# Patient Record
Sex: MALE | Race: BLACK OR AFRICAN AMERICAN | Employment: OTHER | URBAN - NONMETROPOLITAN AREA
[De-identification: names, ages, dates, MRNs, and addresses within clinical notes are randomized per-mention and may not be internally consistent; named-entity substitution may affect disease eponyms.]

---

## 2018-05-22 RX ORDER — BUPIVACAINE HYDROCHLORIDE 2.5 MG/ML
5 INJECTION, SOLUTION EPIDURAL; INFILTRATION; INTRACAUDAL ONCE
Status: CANCELLED | OUTPATIENT
Start: 2018-05-22 | End: 2018-05-22

## 2018-05-22 RX ORDER — METHYLPREDNISOLONE ACETATE 80 MG/ML
80 INJECTION, SUSPENSION INTRA-ARTICULAR; INTRALESIONAL; INTRAMUSCULAR; SOFT TISSUE ONCE
Status: CANCELLED | OUTPATIENT
Start: 2018-05-22 | End: 2018-05-22

## 2018-05-23 ENCOUNTER — HOSPITAL ENCOUNTER (OUTPATIENT)
Dept: INTERVENTIONAL RADIOLOGY/VASCULAR | Age: 68
Discharge: HOME | End: 2018-05-23
Payer: OTHER GOVERNMENT

## 2018-05-23 DIAGNOSIS — M25.552 LEFT HIP PAIN: ICD-10-CM

## 2018-05-23 PROCEDURE — 77002 NEEDLE LOCALIZATION BY XRAY: CPT | Performed by: RADIOLOGY

## 2018-05-23 PROCEDURE — 6360000002 HC RX W HCPCS

## 2018-05-23 PROCEDURE — 20610 DRAIN/INJ JOINT/BURSA W/O US: CPT | Performed by: RADIOLOGY

## 2018-05-23 PROCEDURE — 6360000004 HC RX CONTRAST MEDICATION: Performed by: RADIOLOGY

## 2018-05-23 PROCEDURE — 2500000003 HC RX 250 WO HCPCS

## 2018-05-23 RX ORDER — METHYLPREDNISOLONE ACETATE 80 MG/ML
80 INJECTION, SUSPENSION INTRA-ARTICULAR; INTRALESIONAL; INTRAMUSCULAR; SOFT TISSUE ONCE
Status: COMPLETED | OUTPATIENT
Start: 2018-05-23 | End: 2018-05-23

## 2018-05-23 RX ORDER — BUPIVACAINE HYDROCHLORIDE 2.5 MG/ML
5 INJECTION, SOLUTION EPIDURAL; INFILTRATION; INTRACAUDAL ONCE
Status: COMPLETED | OUTPATIENT
Start: 2018-05-23 | End: 2018-05-23

## 2018-05-23 RX ADMIN — IOTHALAMATE MEGLUMINE 1 ML: 600 INJECTION INTRAVASCULAR at 14:40

## 2018-05-23 RX ADMIN — BUPIVACAINE HYDROCHLORIDE 10 MG: 2.5 INJECTION, SOLUTION EPIDURAL; INFILTRATION; INTRACAUDAL at 14:43

## 2018-05-23 RX ADMIN — METHYLPREDNISOLONE ACETATE 80 MG: 80 INJECTION, SUSPENSION INTRA-ARTICULAR; INTRALESIONAL; INTRAMUSCULAR; SOFT TISSUE at 14:42

## 2018-05-23 ASSESSMENT — PAIN SCALES - GENERAL: PAINLEVEL_OUTOF10: 10

## 2018-05-31 ENCOUNTER — HOSPITAL ENCOUNTER (OUTPATIENT)
Dept: INTERVENTIONAL RADIOLOGY/VASCULAR | Age: 68
Discharge: HOME | End: 2018-05-31
Payer: OTHER GOVERNMENT

## 2018-05-31 VITALS
HEART RATE: 62 BPM | OXYGEN SATURATION: 99 % | RESPIRATION RATE: 18 BRPM | DIASTOLIC BLOOD PRESSURE: 75 MMHG | BODY MASS INDEX: 31.14 KG/M2 | SYSTOLIC BLOOD PRESSURE: 153 MMHG | WEIGHT: 217 LBS

## 2018-05-31 PROCEDURE — 2500000003 HC RX 250 WO HCPCS

## 2018-05-31 PROCEDURE — 6360000002 HC RX W HCPCS

## 2018-05-31 PROCEDURE — 62323 NJX INTERLAMINAR LMBR/SAC: CPT | Performed by: RADIOLOGY

## 2018-05-31 PROCEDURE — 6360000004 HC RX CONTRAST MEDICATION: Performed by: RADIOLOGY

## 2018-05-31 RX ORDER — GLIMEPIRIDE 1 MG/1
1 TABLET ORAL
Status: ON HOLD | COMMUNITY
End: 2018-11-10 | Stop reason: HOSPADM

## 2018-05-31 RX ORDER — BUPIVACAINE HYDROCHLORIDE 2.5 MG/ML
2 INJECTION, SOLUTION EPIDURAL; INFILTRATION; INTRACAUDAL ONCE
Status: COMPLETED | OUTPATIENT
Start: 2018-05-31 | End: 2018-05-31

## 2018-05-31 RX ORDER — CILOSTAZOL 100 MG/1
100 TABLET ORAL 2 TIMES DAILY
Status: ON HOLD | COMMUNITY
End: 2019-02-04 | Stop reason: HOSPADM

## 2018-05-31 RX ORDER — HYDROCHLOROTHIAZIDE 25 MG/1
25 TABLET ORAL DAILY
COMMUNITY

## 2018-05-31 RX ORDER — METHYLPREDNISOLONE ACETATE 80 MG/ML
80 INJECTION, SUSPENSION INTRA-ARTICULAR; INTRALESIONAL; INTRAMUSCULAR; SOFT TISSUE ONCE
Status: COMPLETED | OUTPATIENT
Start: 2018-05-31 | End: 2018-05-31

## 2018-05-31 RX ADMIN — METHYLPREDNISOLONE ACETATE 80 MG: 80 INJECTION, SUSPENSION INTRA-ARTICULAR; INTRALESIONAL; INTRAMUSCULAR; SOFT TISSUE at 14:05

## 2018-05-31 RX ADMIN — IOHEXOL 1 ML: 180 INJECTION INTRAVENOUS at 14:05

## 2018-05-31 RX ADMIN — BUPIVACAINE HYDROCHLORIDE 2 ML: 2.5 INJECTION, SOLUTION EPIDURAL; INFILTRATION; INTRACAUDAL at 14:05

## 2018-05-31 ASSESSMENT — PAIN DESCRIPTION - DIRECTION: RADIATING_TOWARDS: LEFT LEG TO KNEE

## 2018-05-31 ASSESSMENT — PAIN SCALES - GENERAL
PAINLEVEL_OUTOF10: 0
PAINLEVEL_OUTOF10: 4
PAINLEVEL_OUTOF10: 0
PAINLEVEL_OUTOF10: 4

## 2018-05-31 ASSESSMENT — PAIN DESCRIPTION - DESCRIPTORS: DESCRIPTORS: SPASM

## 2018-05-31 ASSESSMENT — PAIN DESCRIPTION - ORIENTATION: ORIENTATION: LEFT;LOWER

## 2018-05-31 ASSESSMENT — PAIN DESCRIPTION - LOCATION: LOCATION: BACK

## 2018-05-31 ASSESSMENT — PAIN DESCRIPTION - PAIN TYPE: TYPE: CHRONIC PAIN

## 2018-05-31 ASSESSMENT — PAIN - FUNCTIONAL ASSESSMENT: PAIN_FUNCTIONAL_ASSESSMENT: 0-10

## 2018-11-05 ENCOUNTER — HOSPITAL ENCOUNTER (INPATIENT)
Age: 68
LOS: 3 days | Discharge: HOME | DRG: 287 | End: 2018-11-10
Attending: INTERNAL MEDICINE | Admitting: INTERNAL MEDICINE
Payer: MEDICARE

## 2018-11-05 DIAGNOSIS — R13.19 OTHER DYSPHAGIA: Primary | ICD-10-CM

## 2018-11-05 PROBLEM — R07.9 CHEST PAIN: Status: ACTIVE | Noted: 2018-11-05

## 2018-11-05 LAB
AVERAGE GLUCOSE: 168 MG/DL (ref 70–126)
D-DIMER QUANTITATIVE: 1492 NG/ML FEU (ref 0–500)
EKG ATRIAL RATE: 67 BPM
EKG P AXIS: 44 DEGREES
EKG P-R INTERVAL: 170 MS
EKG Q-T INTERVAL: 406 MS
EKG QRS DURATION: 102 MS
EKG QTC CALCULATION (BAZETT): 429 MS
EKG R AXIS: -32 DEGREES
EKG T AXIS: -158 DEGREES
EKG VENTRICULAR RATE: 67 BPM
GLUCOSE BLD-MCNC: 138 MG/DL (ref 70–108)
HBA1C MFR BLD: 7.6 % (ref 4.4–6.4)
INR BLD: 0.95 (ref 0.85–1.13)
LIPASE: 21.2 U/L (ref 5.6–51.3)
TROPONIN T: < 0.01 NG/ML

## 2018-11-05 PROCEDURE — 82948 REAGENT STRIP/BLOOD GLUCOSE: CPT

## 2018-11-05 PROCEDURE — 6370000000 HC RX 637 (ALT 250 FOR IP): Performed by: INTERNAL MEDICINE

## 2018-11-05 PROCEDURE — 84484 ASSAY OF TROPONIN QUANT: CPT

## 2018-11-05 PROCEDURE — 99220 PR INITIAL OBSERVATION CARE/DAY 70 MINUTES: CPT | Performed by: INTERNAL MEDICINE

## 2018-11-05 PROCEDURE — 85379 FIBRIN DEGRADATION QUANT: CPT

## 2018-11-05 PROCEDURE — 93005 ELECTROCARDIOGRAM TRACING: CPT | Performed by: INTERNAL MEDICINE

## 2018-11-05 PROCEDURE — 2580000003 HC RX 258: Performed by: INTERNAL MEDICINE

## 2018-11-05 PROCEDURE — 36415 COLL VENOUS BLD VENIPUNCTURE: CPT

## 2018-11-05 PROCEDURE — 85610 PROTHROMBIN TIME: CPT

## 2018-11-05 PROCEDURE — 93010 ELECTROCARDIOGRAM REPORT: CPT | Performed by: NUCLEAR MEDICINE

## 2018-11-05 PROCEDURE — G0378 HOSPITAL OBSERVATION PER HR: HCPCS

## 2018-11-05 PROCEDURE — 83690 ASSAY OF LIPASE: CPT

## 2018-11-05 PROCEDURE — G0379 DIRECT REFER HOSPITAL OBSERV: HCPCS

## 2018-11-05 PROCEDURE — 6360000002 HC RX W HCPCS: Performed by: INTERNAL MEDICINE

## 2018-11-05 PROCEDURE — 2500000003 HC RX 250 WO HCPCS: Performed by: INTERNAL MEDICINE

## 2018-11-05 PROCEDURE — 83036 HEMOGLOBIN GLYCOSYLATED A1C: CPT

## 2018-11-05 RX ORDER — POTASSIUM CHLORIDE 20MEQ/15ML
40 LIQUID (ML) ORAL PRN
Status: DISCONTINUED | OUTPATIENT
Start: 2018-11-05 | End: 2018-11-10 | Stop reason: HOSPADM

## 2018-11-05 RX ORDER — SODIUM CHLORIDE 9 MG/ML
INJECTION, SOLUTION INTRAVENOUS CONTINUOUS
Status: DISCONTINUED | OUTPATIENT
Start: 2018-11-06 | End: 2018-11-10 | Stop reason: HOSPADM

## 2018-11-05 RX ORDER — NICOTINE POLACRILEX 4 MG
15 LOZENGE BUCCAL PRN
Status: DISCONTINUED | OUTPATIENT
Start: 2018-11-05 | End: 2018-11-10 | Stop reason: HOSPADM

## 2018-11-05 RX ORDER — POTASSIUM CHLORIDE 7.45 MG/ML
10 INJECTION INTRAVENOUS PRN
Status: DISCONTINUED | OUTPATIENT
Start: 2018-11-05 | End: 2018-11-10 | Stop reason: HOSPADM

## 2018-11-05 RX ORDER — NITROGLYCERIN 80 MG/1
1 PATCH TRANSDERMAL DAILY
Status: DISCONTINUED | OUTPATIENT
Start: 2018-11-05 | End: 2018-11-05

## 2018-11-05 RX ORDER — IPRATROPIUM BROMIDE AND ALBUTEROL SULFATE 2.5; .5 MG/3ML; MG/3ML
1 SOLUTION RESPIRATORY (INHALATION) EVERY 4 HOURS PRN
Status: DISCONTINUED | OUTPATIENT
Start: 2018-11-05 | End: 2018-11-10 | Stop reason: HOSPADM

## 2018-11-05 RX ORDER — HYDROCHLOROTHIAZIDE 25 MG/1
25 TABLET ORAL DAILY
Status: DISCONTINUED | OUTPATIENT
Start: 2018-11-05 | End: 2018-11-10 | Stop reason: HOSPADM

## 2018-11-05 RX ORDER — DEXTROSE MONOHYDRATE 50 MG/ML
100 INJECTION, SOLUTION INTRAVENOUS PRN
Status: DISCONTINUED | OUTPATIENT
Start: 2018-11-05 | End: 2018-11-10 | Stop reason: HOSPADM

## 2018-11-05 RX ORDER — ASPIRIN 81 MG/1
81 TABLET ORAL DAILY
Status: DISCONTINUED | OUTPATIENT
Start: 2018-11-05 | End: 2018-11-05

## 2018-11-05 RX ORDER — CILOSTAZOL 100 MG/1
100 TABLET ORAL 2 TIMES DAILY
Status: DISCONTINUED | OUTPATIENT
Start: 2018-11-05 | End: 2018-11-10 | Stop reason: HOSPADM

## 2018-11-05 RX ORDER — GLIMEPIRIDE 1 MG/1
1 TABLET ORAL
Status: DISCONTINUED | OUTPATIENT
Start: 2018-11-06 | End: 2018-11-05

## 2018-11-05 RX ORDER — DEXTROSE MONOHYDRATE 25 G/50ML
12.5 INJECTION, SOLUTION INTRAVENOUS PRN
Status: DISCONTINUED | OUTPATIENT
Start: 2018-11-05 | End: 2018-11-10 | Stop reason: HOSPADM

## 2018-11-05 RX ORDER — SPIRONOLACTONE 25 MG/1
25 TABLET ORAL DAILY
Status: DISCONTINUED | OUTPATIENT
Start: 2018-11-05 | End: 2018-11-10 | Stop reason: HOSPADM

## 2018-11-05 RX ORDER — NITROGLYCERIN 0.4 MG/1
0.4 TABLET SUBLINGUAL EVERY 5 MIN PRN
Status: DISCONTINUED | OUTPATIENT
Start: 2018-11-05 | End: 2018-11-10 | Stop reason: HOSPADM

## 2018-11-05 RX ORDER — AMLODIPINE BESYLATE 10 MG/1
10 TABLET ORAL DAILY
Status: DISCONTINUED | OUTPATIENT
Start: 2018-11-05 | End: 2018-11-10 | Stop reason: HOSPADM

## 2018-11-05 RX ORDER — ISOSORBIDE MONONITRATE 30 MG/1
30 TABLET, EXTENDED RELEASE ORAL DAILY
Status: DISCONTINUED | OUTPATIENT
Start: 2018-11-06 | End: 2018-11-09

## 2018-11-05 RX ORDER — SODIUM CHLORIDE 0.9 % (FLUSH) 0.9 %
10 SYRINGE (ML) INJECTION PRN
Status: DISCONTINUED | OUTPATIENT
Start: 2018-11-05 | End: 2018-11-10 | Stop reason: HOSPADM

## 2018-11-05 RX ORDER — ATORVASTATIN CALCIUM 40 MG/1
40 TABLET, FILM COATED ORAL DAILY
Status: DISCONTINUED | OUTPATIENT
Start: 2018-11-05 | End: 2018-11-10 | Stop reason: HOSPADM

## 2018-11-05 RX ORDER — SODIUM CHLORIDE 0.9 % (FLUSH) 0.9 %
10 SYRINGE (ML) INJECTION EVERY 12 HOURS SCHEDULED
Status: DISCONTINUED | OUTPATIENT
Start: 2018-11-05 | End: 2018-11-10 | Stop reason: HOSPADM

## 2018-11-05 RX ORDER — POTASSIUM CHLORIDE 20 MEQ/1
40 TABLET, EXTENDED RELEASE ORAL PRN
Status: DISCONTINUED | OUTPATIENT
Start: 2018-11-05 | End: 2018-11-10 | Stop reason: HOSPADM

## 2018-11-05 RX ORDER — TRAMADOL HYDROCHLORIDE 50 MG/1
50 TABLET ORAL EVERY 6 HOURS PRN
Status: DISCONTINUED | OUTPATIENT
Start: 2018-11-05 | End: 2018-11-10 | Stop reason: HOSPADM

## 2018-11-05 RX ORDER — NITROGLYCERIN 80 MG/1
1 PATCH TRANSDERMAL DAILY
Status: COMPLETED | OUTPATIENT
Start: 2018-11-05 | End: 2018-11-06

## 2018-11-05 RX ORDER — LABETALOL HYDROCHLORIDE 5 MG/ML
10 INJECTION, SOLUTION INTRAVENOUS EVERY 6 HOURS
Status: DISCONTINUED | OUTPATIENT
Start: 2018-11-05 | End: 2018-11-10 | Stop reason: HOSPADM

## 2018-11-05 RX ORDER — ONDANSETRON 2 MG/ML
4 INJECTION INTRAMUSCULAR; INTRAVENOUS EVERY 4 HOURS PRN
Status: DISCONTINUED | OUTPATIENT
Start: 2018-11-05 | End: 2018-11-09 | Stop reason: SDUPTHER

## 2018-11-05 RX ADMIN — Medication 10 ML: at 21:34

## 2018-11-05 RX ADMIN — HYDROCHLOROTHIAZIDE 25 MG: 25 TABLET ORAL at 17:25

## 2018-11-05 RX ADMIN — AMLODIPINE BESYLATE 10 MG: 10 TABLET ORAL at 17:25

## 2018-11-05 RX ADMIN — LIDOCAINE HYDROCHLORIDE: 20 SOLUTION ORAL; TOPICAL at 17:25

## 2018-11-05 RX ADMIN — SODIUM CHLORIDE: 9 INJECTION, SOLUTION INTRAVENOUS at 23:53

## 2018-11-05 RX ADMIN — ATORVASTATIN CALCIUM 40 MG: 40 TABLET, FILM COATED ORAL at 21:30

## 2018-11-05 RX ADMIN — CILOSTAZOL 100 MG: 100 TABLET ORAL at 21:30

## 2018-11-05 RX ADMIN — SPIRONOLACTONE 25 MG: 25 TABLET ORAL at 17:25

## 2018-11-05 RX ADMIN — ENOXAPARIN SODIUM 100 MG: 100 INJECTION SUBCUTANEOUS at 17:30

## 2018-11-05 ASSESSMENT — PAIN SCALES - GENERAL
PAINLEVEL_OUTOF10: 0
PAINLEVEL_OUTOF10: 2

## 2018-11-05 ASSESSMENT — PAIN DESCRIPTION - DESCRIPTORS: DESCRIPTORS: HEAVINESS

## 2018-11-05 ASSESSMENT — PAIN DESCRIPTION - PAIN TYPE: TYPE: ACUTE PAIN

## 2018-11-05 ASSESSMENT — PAIN DESCRIPTION - ORIENTATION: ORIENTATION: LEFT

## 2018-11-05 ASSESSMENT — PAIN DESCRIPTION - FREQUENCY: FREQUENCY: CONTINUOUS

## 2018-11-05 ASSESSMENT — PAIN DESCRIPTION - LOCATION: LOCATION: CHEST

## 2018-11-05 ASSESSMENT — PAIN DESCRIPTION - ONSET: ONSET: AWAKENED FROM SLEEP

## 2018-11-05 NOTE — H&P
Smoker     Years: 20.00    Smokeless tobacco: Not on file    Alcohol use No    Drug use: No    Sexual activity: Not on file     Other Topics Concern    Not on file     Social History Narrative    No narrative on file         TOBACCO:   reports that he has quit smoking. He quit after 20.00 years of use. He does not have any smokeless tobacco history on file. ETOH:   reports that he does not drink alcohol. Review of systems     Pertinent positives as noted in the HPI. All other systemsreviewed and negative. Review of Systems      Physical examination     There were no vitals taken for this visit. General appearance:  No apparent distress. Patient is very conversational  HEENT: Normocephalic. Pupils equal, round, and reactive to light. Extraocular motion intact. Conjunctivae clear. Nose symmetric without evidence of discharge. Oral mucosa moist without erythema or exudate. Neck: Supple. NoJVD. No carotid bruits. Trachea midline. No thyromegaly. Cardiovascular:  Regular rate and rhythm/ Mild 2/6 MARY. No rubs or gallops. Respiratory: Clear to auscultation, bilaterally without Rales/Wheezes/Rhonchi. Abdomen: Soft, non-tender, non-distended with normal bowel sounds. Musculoskeletal:  Full range of motion without deformity. Neurologic:  No focal sensory/motor deficits. Cranial nerves: II-XII intact. Lymphatic: No cervical lymphadenopathy. Psychiatric:  Alert and oriented. Vascular: Radial pulses palpable. .  No peripheral edema. Decreased dorsalis pedis at the left lower extremity  Genitourinary: Deferred. Skin: No visible rashes or lesions.       Labs and imaging     WBC 9.17 4.50 - 11.00 K/mcL [address] [place] [address] LAB   RBC 4.69 4.50 - 5.90 M/mcL [address] [place] [address] LAB   Hemoglobin 13.4 (L) 13.5 - 17.5 g/dL [address] [place] [address] LAB   Hematocrit 41.4 41.0 - 53.0 % [facility] LAB   MCV 88.3 80.0 - 100.0 fL [address] [place] [address] LAB   MCH 28.6 26.0 - 34.0 pg [address] [place] [address] LAB   MCHC 32.4 31.0 - 37.0 g/dL [facility] LAB   Platelets 161 239 - 344 K/mcL [address] [place] [address] LAB   RDW - CV 11.9 11.6 - anginal chest pain as aborted with the use of nitroglycerin with one dose of enoxaparin and nitroglycerin patch  4. Pharmacological stress test ordered  5. Screen with D-dimer for pulmonary embolism. Check lipase. 6. Hold Mobic for now and replace with tramadol pending stress test  7. If no findings on stress test consider further gastrointestinal workup such as EGD  8. Repeat EKG  9. Echocardiogram  10. Continue to monitor closely  11. Use ISS for now pending evaluation for acute coronary syndrome  12. Labetalol prn for uncontrolled blood pressure. Continue hydrochlorothiazide, amlodipine and isosorbide mononitirate     DVT prophylaxis: [x] Lovenox                                 [x] SCDs                                 [] SQ Heparin                                 [] Encourage ambulation           [] Already on Anticoagulation    Disposition:    [x] Home       [] TCU       [] Rehab       [] Psych       [] SNF       [] [place]       [] Other-      Code Status: Full Code    Placed in Observation. Thank you Kavitha Nance for theopportunity to be involved in this patient's care.     Electronically signed by Marko Gary MD on 11/5/2018 at 3:58 PM

## 2018-11-06 ENCOUNTER — APPOINTMENT (OUTPATIENT)
Dept: CT IMAGING | Age: 68
DRG: 287 | End: 2018-11-06
Attending: INTERNAL MEDICINE
Payer: MEDICARE

## 2018-11-06 ENCOUNTER — APPOINTMENT (OUTPATIENT)
Dept: NON INVASIVE DIAGNOSTICS | Age: 68
DRG: 287 | End: 2018-11-06
Attending: INTERNAL MEDICINE
Payer: MEDICARE

## 2018-11-06 PROBLEM — I71.23 DESCENDING THORACIC AORTIC ANEURYSM (HCC): Status: ACTIVE | Noted: 2018-11-06

## 2018-11-06 PROBLEM — E11.9 TYPE 2 DIABETES MELLITUS, WITHOUT LONG-TERM CURRENT USE OF INSULIN (HCC): Status: ACTIVE | Noted: 2018-11-06

## 2018-11-06 LAB
ANION GAP SERPL CALCULATED.3IONS-SCNC: 12 MEQ/L (ref 8–16)
BACTERIA: NORMAL
BILIRUBIN URINE: NEGATIVE
BLOOD, URINE: NEGATIVE
BUN BLDV-MCNC: 11 MG/DL (ref 7–22)
CALCIUM SERPL-MCNC: 8.9 MG/DL (ref 8.5–10.5)
CASTS: NORMAL /LPF
CASTS: NORMAL /LPF
CHARACTER, URINE: CLEAR
CHLORIDE BLD-SCNC: 103 MEQ/L (ref 98–111)
CHOLESTEROL, TOTAL: 169 MG/DL (ref 100–199)
CO2: 23 MEQ/L (ref 23–33)
COLOR: YELLOW
CREAT SERPL-MCNC: 0.8 MG/DL (ref 0.4–1.2)
CRYSTALS: NORMAL
EPITHELIAL CELLS, UA: NORMAL /HPF
ERYTHROCYTE [DISTWIDTH] IN BLOOD BY AUTOMATED COUNT: 12.1 % (ref 11.5–14.5)
ERYTHROCYTE [DISTWIDTH] IN BLOOD BY AUTOMATED COUNT: 38.5 FL (ref 35–45)
GFR SERPL CREATININE-BSD FRML MDRD: > 90 ML/MIN/1.73M2
GLUCOSE BLD-MCNC: 161 MG/DL (ref 70–108)
GLUCOSE BLD-MCNC: 173 MG/DL (ref 70–108)
GLUCOSE BLD-MCNC: 177 MG/DL (ref 70–108)
GLUCOSE BLD-MCNC: 178 MG/DL (ref 70–108)
GLUCOSE BLD-MCNC: 224 MG/DL (ref 70–108)
GLUCOSE, URINE: NEGATIVE MG/DL
HCT VFR BLD CALC: 38.3 % (ref 42–52)
HDLC SERPL-MCNC: 31 MG/DL
HEMOGLOBIN: 12.3 GM/DL (ref 14–18)
KETONES, URINE: NEGATIVE
LDL CHOLESTEROL CALCULATED: 74 MG/DL
LEUKOCYTE ESTERASE, URINE: NEGATIVE
LV EF: 58 %
LVEF MODALITY: NORMAL
MCH RBC QN AUTO: 28.6 PG (ref 26–33)
MCHC RBC AUTO-ENTMCNC: 32.1 GM/DL (ref 32.2–35.5)
MCV RBC AUTO: 89.1 FL (ref 80–94)
MISCELLANEOUS LAB TEST RESULT: NORMAL
NITRITE, URINE: NEGATIVE
PH UA: 5
PLATELET # BLD: 170 THOU/MM3 (ref 130–400)
PMV BLD AUTO: 13.3 FL (ref 9.4–12.4)
POTASSIUM REFLEX MAGNESIUM: 3.6 MEQ/L (ref 3.5–5.2)
PROTEIN UA: NEGATIVE MG/DL
RBC # BLD: 4.3 MILL/MM3 (ref 4.7–6.1)
RBC URINE: NORMAL /HPF
RENAL EPITHELIAL, UA: NORMAL
SODIUM BLD-SCNC: 138 MEQ/L (ref 135–145)
SPECIFIC GRAVITY UA: 1.01 (ref 1–1.03)
TRIGL SERPL-MCNC: 321 MG/DL (ref 0–199)
TROPONIN T: < 0.01 NG/ML
UROBILINOGEN, URINE: 0.2 EU/DL
WBC # BLD: 10.5 THOU/MM3 (ref 4.8–10.8)
WBC UA: NORMAL /HPF
YEAST: NORMAL

## 2018-11-06 PROCEDURE — 99225 PR SBSQ OBSERVATION CARE/DAY 25 MINUTES: CPT | Performed by: FAMILY MEDICINE

## 2018-11-06 PROCEDURE — 6360000002 HC RX W HCPCS: Performed by: INTERNAL MEDICINE

## 2018-11-06 PROCEDURE — 82948 REAGENT STRIP/BLOOD GLUCOSE: CPT

## 2018-11-06 PROCEDURE — 6370000000 HC RX 637 (ALT 250 FOR IP): Performed by: INTERNAL MEDICINE

## 2018-11-06 PROCEDURE — 81001 URINALYSIS AUTO W/SCOPE: CPT

## 2018-11-06 PROCEDURE — 96372 THER/PROPH/DIAG INJ SC/IM: CPT

## 2018-11-06 PROCEDURE — G0378 HOSPITAL OBSERVATION PER HR: HCPCS

## 2018-11-06 PROCEDURE — 36415 COLL VENOUS BLD VENIPUNCTURE: CPT

## 2018-11-06 PROCEDURE — A9500 TC99M SESTAMIBI: HCPCS | Performed by: FAMILY MEDICINE

## 2018-11-06 PROCEDURE — 93306 TTE W/DOPPLER COMPLETE: CPT

## 2018-11-06 PROCEDURE — 3430000000 HC RX DIAGNOSTIC RADIOPHARMACEUTICAL: Performed by: FAMILY MEDICINE

## 2018-11-06 PROCEDURE — 6360000002 HC RX W HCPCS

## 2018-11-06 PROCEDURE — 80048 BASIC METABOLIC PNL TOTAL CA: CPT

## 2018-11-06 PROCEDURE — 6360000004 HC RX CONTRAST MEDICATION: Performed by: FAMILY MEDICINE

## 2018-11-06 PROCEDURE — 2709999900 HC NON-CHARGEABLE SUPPLY

## 2018-11-06 PROCEDURE — 93017 CV STRESS TEST TRACING ONLY: CPT | Performed by: INTERNAL MEDICINE

## 2018-11-06 PROCEDURE — 71275 CT ANGIOGRAPHY CHEST: CPT

## 2018-11-06 PROCEDURE — 85027 COMPLETE CBC AUTOMATED: CPT

## 2018-11-06 PROCEDURE — 78452 HT MUSCLE IMAGE SPECT MULT: CPT

## 2018-11-06 PROCEDURE — 87086 URINE CULTURE/COLONY COUNT: CPT

## 2018-11-06 PROCEDURE — 80061 LIPID PANEL: CPT

## 2018-11-06 PROCEDURE — 2580000003 HC RX 258: Performed by: INTERNAL MEDICINE

## 2018-11-06 RX ORDER — ALBUTEROL SULFATE 90 UG/1
2 AEROSOL, METERED RESPIRATORY (INHALATION) EVERY 4 HOURS PRN
COMMUNITY

## 2018-11-06 RX ORDER — MELOXICAM 15 MG/1
15 TABLET ORAL DAILY
Status: ON HOLD | COMMUNITY
End: 2018-11-10 | Stop reason: HOSPADM

## 2018-11-06 RX ORDER — IBUPROFEN 600 MG/1
600 TABLET ORAL EVERY 4 HOURS PRN
Status: ON HOLD | COMMUNITY
End: 2018-11-10 | Stop reason: HOSPADM

## 2018-11-06 RX ADMIN — AMLODIPINE BESYLATE 10 MG: 10 TABLET ORAL at 11:33

## 2018-11-06 RX ADMIN — CILOSTAZOL 100 MG: 100 TABLET ORAL at 11:33

## 2018-11-06 RX ADMIN — HYDROCHLOROTHIAZIDE 25 MG: 25 TABLET ORAL at 11:33

## 2018-11-06 RX ADMIN — SPIRONOLACTONE 25 MG: 25 TABLET ORAL at 11:33

## 2018-11-06 RX ADMIN — CILOSTAZOL 100 MG: 100 TABLET ORAL at 20:07

## 2018-11-06 RX ADMIN — ATORVASTATIN CALCIUM 40 MG: 40 TABLET, FILM COATED ORAL at 20:07

## 2018-11-06 RX ADMIN — ASPIRIN 325 MG: 325 TABLET, DELAYED RELEASE ORAL at 11:33

## 2018-11-06 RX ADMIN — IOPAMIDOL 80 ML: 755 INJECTION, SOLUTION INTRAVENOUS at 11:02

## 2018-11-06 RX ADMIN — Medication 10 ML: at 20:07

## 2018-11-06 RX ADMIN — ENOXAPARIN SODIUM 100 MG: 100 INJECTION SUBCUTANEOUS at 03:24

## 2018-11-06 RX ADMIN — Medication 29.8 MILLICURIE: at 09:37

## 2018-11-06 RX ADMIN — ENOXAPARIN SODIUM 100 MG: 100 INJECTION SUBCUTANEOUS at 17:46

## 2018-11-06 RX ADMIN — ISOSORBIDE MONONITRATE 30 MG: 30 TABLET ORAL at 11:33

## 2018-11-06 RX ADMIN — Medication 4 UNITS: at 11:36

## 2018-11-06 RX ADMIN — Medication 9.6 MILLICURIE: at 08:00

## 2018-11-06 ASSESSMENT — PAIN SCALES - GENERAL: PAINLEVEL_OUTOF10: 0

## 2018-11-06 NOTE — PROGRESS NOTES
 insulin lispro  0-6 Units Subcutaneous Nightly    labetalol  10 mg Intravenous Q6H    aspirin  325 mg Oral Daily    enoxaparin  1 mg/kg Subcutaneous Q12H     PRN Meds: nitroGLYCERIN, sodium chloride flush, magnesium hydroxide, ondansetron, potassium chloride **OR** potassium chloride **OR** potassium chloride, potassium chloride, magnesium sulfate, glucose, dextrose, glucagon (rDNA), dextrose, glucose, dextrose, glucagon (rDNA), dextrose, ipratropium-albuterol, traMADol      Intake/Output Summary (Last 24 hours) at 11/06/18 0750  Last data filed at 11/06/18 0316   Gross per 24 hour   Intake              504 ml   Output              553 ml   Net              -49 ml       Diet:  Diet NPO, After Midnight    Exam:  /68   Pulse 78   Temp 98.7 °F (37.1 °C) (Oral)   Resp 16   Ht 5' 10\" (1.778 m)   Wt 218 lb 12.8 oz (99.2 kg)   SpO2 97%   BMI 31.39 kg/m²     General appearance: alert, no in acute distress   HEENT: clear oral mucosa, no oropharyngeal erythema   Neck: Supple, with full range of motion. No jugular venous distention. Trachea midline. Respiratory:  Normal respiratory effort. Clear to auscultation, bilaterally without Rales/Wheezes/Rhonchi. Cardiovascular: Regular rate and rhythm with normal S1/S2 without murmurs, rubs or gallops. Abdomen: Soft, non-tender, non-distended with normal bowel sounds. Musculoskeletal: passive and active ROM x 4 extremities. Skin: Skin color, texture, turgor normal.  No rashes or lesions. Psychiatric: Alert and oriented, thought content appropriate, normal insight  Exam of extremities: peripheral pulses normal, no pedal edema, no clubbing or cyanosis      Labs:   Recent Labs      11/06/18   0500   WBC  10.5   HGB  12.3*   HCT  38.3*   PLT  170     Recent Labs      11/06/18   0500   NA  138   K  3.6   CL  103   CO2  23   BUN  11   CREATININE  0.8   CALCIUM  8.9     No results for input(s): AST, ALT, BILIDIR, BILITOT, ALKPHOS in the last 72 hours.   Recent Labs

## 2018-11-07 LAB
GLUCOSE BLD-MCNC: 143 MG/DL (ref 70–108)
GLUCOSE BLD-MCNC: 153 MG/DL (ref 70–108)
GLUCOSE BLD-MCNC: 190 MG/DL (ref 70–108)
GLUCOSE BLD-MCNC: 220 MG/DL (ref 70–108)

## 2018-11-07 PROCEDURE — 2580000003 HC RX 258: Performed by: INTERNAL MEDICINE

## 2018-11-07 PROCEDURE — 87880 STREP A ASSAY W/OPTIC: CPT

## 2018-11-07 PROCEDURE — 1200000003 HC TELEMETRY R&B

## 2018-11-07 PROCEDURE — 6370000000 HC RX 637 (ALT 250 FOR IP): Performed by: INTERNAL MEDICINE

## 2018-11-07 PROCEDURE — 6360000002 HC RX W HCPCS: Performed by: INTERNAL MEDICINE

## 2018-11-07 PROCEDURE — 82948 REAGENT STRIP/BLOOD GLUCOSE: CPT

## 2018-11-07 PROCEDURE — 99232 SBSQ HOSP IP/OBS MODERATE 35: CPT | Performed by: FAMILY MEDICINE

## 2018-11-07 PROCEDURE — 6370000000 HC RX 637 (ALT 250 FOR IP): Performed by: FAMILY MEDICINE

## 2018-11-07 PROCEDURE — 96372 THER/PROPH/DIAG INJ SC/IM: CPT

## 2018-11-07 RX ORDER — GUAIFENESIN 600 MG/1
600 TABLET, EXTENDED RELEASE ORAL 2 TIMES DAILY
Status: DISCONTINUED | OUTPATIENT
Start: 2018-11-07 | End: 2018-11-10 | Stop reason: HOSPADM

## 2018-11-07 RX ORDER — PANTOPRAZOLE SODIUM 40 MG/1
40 TABLET, DELAYED RELEASE ORAL
Status: DISCONTINUED | OUTPATIENT
Start: 2018-11-08 | End: 2018-11-10 | Stop reason: HOSPADM

## 2018-11-07 RX ADMIN — GUAIFENESIN 600 MG: 600 TABLET, EXTENDED RELEASE ORAL at 22:22

## 2018-11-07 RX ADMIN — ISOSORBIDE MONONITRATE 30 MG: 30 TABLET ORAL at 08:39

## 2018-11-07 RX ADMIN — Medication 10 ML: at 08:40

## 2018-11-07 RX ADMIN — GUAIFENESIN 600 MG: 600 TABLET, EXTENDED RELEASE ORAL at 14:40

## 2018-11-07 RX ADMIN — HYDROCHLOROTHIAZIDE 25 MG: 25 TABLET ORAL at 08:39

## 2018-11-07 RX ADMIN — CILOSTAZOL 100 MG: 100 TABLET ORAL at 22:22

## 2018-11-07 RX ADMIN — Medication 10 ML: at 22:23

## 2018-11-07 RX ADMIN — ENOXAPARIN SODIUM 100 MG: 100 INJECTION SUBCUTANEOUS at 19:05

## 2018-11-07 RX ADMIN — AMLODIPINE BESYLATE 10 MG: 10 TABLET ORAL at 08:39

## 2018-11-07 RX ADMIN — ATORVASTATIN CALCIUM 40 MG: 40 TABLET, FILM COATED ORAL at 22:22

## 2018-11-07 RX ADMIN — CILOSTAZOL 100 MG: 100 TABLET ORAL at 08:39

## 2018-11-07 RX ADMIN — ASPIRIN 325 MG: 325 TABLET, DELAYED RELEASE ORAL at 08:39

## 2018-11-07 RX ADMIN — SPIRONOLACTONE 25 MG: 25 TABLET ORAL at 08:39

## 2018-11-07 ASSESSMENT — PAIN SCALES - GENERAL
PAINLEVEL_OUTOF10: 0

## 2018-11-07 NOTE — PROGRESS NOTES
0-18 Units Subcutaneous TID     insulin lispro  0-9 Units Subcutaneous Nightly    atorvastatin  40 mg Oral Daily    isosorbide mononitrate  30 mg Oral Daily    spironolactone  25 mg Oral Daily    amLODIPine  10 mg Oral Daily    hydrochlorothiazide  25 mg Oral Daily    cilostazol  100 mg Oral BID    sodium chloride flush  10 mL Intravenous 2 times per day    labetalol  10 mg Intravenous Q6H    aspirin  325 mg Oral Daily    enoxaparin  1 mg/kg Subcutaneous Q12H     PRN Meds: nitroGLYCERIN, sodium chloride flush, magnesium hydroxide, ondansetron, potassium chloride **OR** potassium chloride **OR** potassium chloride, potassium chloride, magnesium sulfate, glucose, dextrose, glucagon (rDNA), dextrose, glucose, dextrose, glucagon (rDNA), dextrose, ipratropium-albuterol, traMADol      Intake/Output Summary (Last 24 hours) at 11/07/18 1033  Last data filed at 11/07/18 0524   Gross per 24 hour   Intake              730 ml   Output              850 ml   Net             -120 ml       Diet:  DIET CARB CONTROL; No Added Salt (3-4 GM)    Exam:  /76   Pulse 73   Temp 98.2 °F (36.8 °C) (Oral)   Resp 16   Ht 5' 10\" (1.778 m)   Wt 214 lb 12.8 oz (97.4 kg)   SpO2 95%   BMI 30.82 kg/m²     General appearance: alert, not in acute distress   HEENT: clear oral mucosa, no oropharyngeal erythema, no tonsillar swelling or erythema. Neck: Supple, with full range of motion. No jugular venous distention. Trachea midline. Respiratory:  Normal respiratory effort. Clear to auscultation, bilaterally without Rales/Wheezes/Rhonchi. Cardiovascular: Regular rate and rhythm with normal S1/S2 without murmurs, rubs or gallops. Abdomen: Soft, non-tender, non-distended with normal bowel sounds. Musculoskeletal: passive and active ROM x 4 extremities. Skin: Skin color, texture, turgor normal.  No rashes or lesions.   Psychiatric: Alert and oriented, thought content appropriate, normal insight  Exam of extremities: peripheral ordered    4. Dry cough    -chest CTA showed no lung consolidations  -start Mucinex     5. Aneurysm on descending thoracic aorta, w atherosclerotic calcifications and mural thrombus    -cardiothoracic consult  -pt already on lovenox SQ      6. HTN, controlled     -cont amlodipine, HCTZ, aldactone, labetalol  -VS per protocol  -low salt diet      7. DM type 2, fairly controlled     -A1C on 11/5/18: 7.6. accu-check BG not controlled. -increased SSI to high dose   -cont accu-check   -carb-control diet   -pt reports he was on metformin but was stopped 5 years ago by pcp due to improved blood sugar ( per pt)     8. H/o COPD     -not on exacerbation   -monitor. duoneb prn      9.  Former smoker          Electronically signed by Wes Champagne MD on 11/7/2018 at 10:33 AM

## 2018-11-07 NOTE — PLAN OF CARE
Problem: Pain:  Goal: Pain level will decrease  Pain level will decrease    Outcome: Ongoing  Pt has not complained of any pain this shift. Will continue to monitor. Problem: Pain:  Goal: Pain level will decrease  Pain level will decrease    Outcome: Ongoing  Pt has not complained of any pain this shift. Will continue to monitor. Problem: Infection:  Goal: Will remain free from infection  Will remain free from infection  Outcome: Ongoing  Pt has not shown any signs or symptoms of infection. Will continue to monitor. Problem: Safety:  Goal: Free from accidental physical injury  Free from accidental physical injury  Outcome: Ongoing  No falls noted this shift. Continue falling star program. Bed alarm on, bed in low position. Call light and personal belongings in reach. Patient uses call light appropriately. Problem: Skin Integrity:  Goal: Skin integrity will stabilize  Skin integrity will stabilize  Outcome: Ongoing  No new signs or symptoms of skin breakdown noted this shift, encouraging patient to turn and reposition self in bed q2h      Comments: Care plan reviewed with patient and wife. Patient and wife verbalize understanding of the plan of care and contribute to goal setting.

## 2018-11-08 ENCOUNTER — APPOINTMENT (OUTPATIENT)
Dept: GENERAL RADIOLOGY | Age: 68
DRG: 287 | End: 2018-11-08
Attending: INTERNAL MEDICINE
Payer: MEDICARE

## 2018-11-08 ENCOUNTER — APPOINTMENT (OUTPATIENT)
Dept: INTERVENTIONAL RADIOLOGY/VASCULAR | Age: 68
DRG: 287 | End: 2018-11-08
Attending: INTERNAL MEDICINE
Payer: MEDICARE

## 2018-11-08 LAB
GLUCOSE BLD-MCNC: 147 MG/DL (ref 70–108)
GLUCOSE BLD-MCNC: 157 MG/DL (ref 70–108)
GLUCOSE BLD-MCNC: 193 MG/DL (ref 70–108)
GLUCOSE BLD-MCNC: 227 MG/DL (ref 70–108)
GROUP A STREP CULTURE, REFLEX: NEGATIVE
REFLEX THROAT C + S: NORMAL
URINE CULTURE, ROUTINE: NORMAL

## 2018-11-08 PROCEDURE — 2580000003 HC RX 258: Performed by: INTERNAL MEDICINE

## 2018-11-08 PROCEDURE — 74220 X-RAY XM ESOPHAGUS 1CNTRST: CPT

## 2018-11-08 PROCEDURE — 6370000000 HC RX 637 (ALT 250 FOR IP): Performed by: FAMILY MEDICINE

## 2018-11-08 PROCEDURE — 6370000000 HC RX 637 (ALT 250 FOR IP): Performed by: NURSE PRACTITIONER

## 2018-11-08 PROCEDURE — 6370000000 HC RX 637 (ALT 250 FOR IP): Performed by: INTERNAL MEDICINE

## 2018-11-08 PROCEDURE — A4641 RADIOPHARM DX AGENT NOC: HCPCS | Performed by: NURSE PRACTITIONER

## 2018-11-08 PROCEDURE — 87070 CULTURE OTHR SPECIMN AEROBIC: CPT

## 2018-11-08 PROCEDURE — 93880 EXTRACRANIAL BILAT STUDY: CPT

## 2018-11-08 PROCEDURE — 6360000004 HC RX CONTRAST MEDICATION: Performed by: NURSE PRACTITIONER

## 2018-11-08 PROCEDURE — 99232 SBSQ HOSP IP/OBS MODERATE 35: CPT | Performed by: FAMILY MEDICINE

## 2018-11-08 PROCEDURE — 92610 EVALUATE SWALLOWING FUNCTION: CPT

## 2018-11-08 PROCEDURE — APPSS60 APP SPLIT SHARED TIME 46-60 MINUTES: Performed by: PHYSICIAN ASSISTANT

## 2018-11-08 PROCEDURE — 6360000002 HC RX W HCPCS: Performed by: INTERNAL MEDICINE

## 2018-11-08 PROCEDURE — 82948 REAGENT STRIP/BLOOD GLUCOSE: CPT

## 2018-11-08 PROCEDURE — 2500000003 HC RX 250 WO HCPCS: Performed by: NURSE PRACTITIONER

## 2018-11-08 PROCEDURE — 1200000003 HC TELEMETRY R&B

## 2018-11-08 RX ORDER — GLIPIZIDE 5 MG/1
2.5 TABLET ORAL
Status: DISCONTINUED | OUTPATIENT
Start: 2018-11-08 | End: 2018-11-10 | Stop reason: HOSPADM

## 2018-11-08 RX ORDER — POLYETHYLENE GLYCOL 3350 17 G/17G
17 POWDER, FOR SOLUTION ORAL DAILY PRN
Status: DISCONTINUED | OUTPATIENT
Start: 2018-11-08 | End: 2018-11-10 | Stop reason: HOSPADM

## 2018-11-08 RX ADMIN — GUAIFENESIN 600 MG: 600 TABLET, EXTENDED RELEASE ORAL at 10:00

## 2018-11-08 RX ADMIN — ENOXAPARIN SODIUM 100 MG: 100 INJECTION SUBCUTANEOUS at 16:40

## 2018-11-08 RX ADMIN — ANTACID/ANTIFLATULENT 1 EACH: 380; 550; 10; 10 GRANULE, EFFERVESCENT ORAL at 08:50

## 2018-11-08 RX ADMIN — SPIRONOLACTONE 25 MG: 25 TABLET ORAL at 09:00

## 2018-11-08 RX ADMIN — CILOSTAZOL 100 MG: 100 TABLET ORAL at 10:07

## 2018-11-08 RX ADMIN — AMLODIPINE BESYLATE 10 MG: 10 TABLET ORAL at 10:07

## 2018-11-08 RX ADMIN — GUAIFENESIN 600 MG: 600 TABLET, EXTENDED RELEASE ORAL at 21:00

## 2018-11-08 RX ADMIN — INSULIN LISPRO 6 UNITS: 100 INJECTION, SOLUTION INTRAVENOUS; SUBCUTANEOUS at 16:42

## 2018-11-08 RX ADMIN — PANTOPRAZOLE SODIUM 40 MG: 40 TABLET, DELAYED RELEASE ORAL at 05:59

## 2018-11-08 RX ADMIN — Medication 10 ML: at 22:56

## 2018-11-08 RX ADMIN — CILOSTAZOL 100 MG: 100 TABLET ORAL at 22:55

## 2018-11-08 RX ADMIN — HYDROCHLOROTHIAZIDE 25 MG: 25 TABLET ORAL at 10:07

## 2018-11-08 RX ADMIN — BARIUM SULFATE 90 ML: 0.6 SUSPENSION ORAL at 08:50

## 2018-11-08 RX ADMIN — GLIPIZIDE 2.5 MG: 5 TABLET ORAL at 16:41

## 2018-11-08 RX ADMIN — ISOSORBIDE MONONITRATE 30 MG: 30 TABLET ORAL at 10:07

## 2018-11-08 RX ADMIN — BARIUM SULFATE 140 ML: 980 POWDER, FOR SUSPENSION ORAL at 08:51

## 2018-11-08 RX ADMIN — ATORVASTATIN CALCIUM 40 MG: 40 TABLET, FILM COATED ORAL at 23:06

## 2018-11-08 ASSESSMENT — PAIN SCALES - GENERAL
PAINLEVEL_OUTOF10: 0

## 2018-11-08 NOTE — PROGRESS NOTES
[address] [address]  SPEECH THERAPY  [facility] RENAL TELEMETRY 6K  Bedside Swallowing Evaluation      SLP Individual Minutes  Time In: 9792  Time Out: 9353  Minutes: 25  Timed Code Treatment Minutes: 0 Minutes       Date: 2018  Patient Name: More Curran      CSN: [id number]   : 1950  (77 y.o.)  Gender: male    Referring Physician:  LAURA Knight CNP  Diagnosis: Chest Pain   Secondary Diagnosis:  Dysphagia  History of Present Illness/Injury: Patient admitted to [facility] with above dx. See physician H&P for details. Patient with c/o \"things feel like they get stuck and eventually pass through pointing to the upper esophagus. \"  LAURA Knight CNP present for beginning of session. ST consulted by GI to complete BSE and determine best recommendations.    Past Medical History:   Diagnosis Date    Arthritis     Asthma     Blood circulation, collateral     bilateral LE    COPD (chronic obstructive pulmonary disease) (HCC)     Diabetes mellitus ([address]     Hyperlipidemia     Hypertension        Pain:  0/10    Current Diet: General with thin     Respiratory Status: [x] Independent [] Nasal Cannula [] Oxygen Mask      [] Tracheostomy [] Other:     [] Ventilator/Settings:    Behavioral Observation: [x] Alert [x] Oriented [] Confused [] Lethargic      [] Dysarthric [] Limited Direction Following [] Agitated      [] Other:    ORAL MECHANISM EVALUATION:         Comments:  Facial / Labial [x]WFL [] Impaired []DNT    Lingual [x]WFL [] Impaired []DNT    Dentition [x]WFL [] Impaired []DNT    Velum [x]WFL [] Impaired []DNT    Vocal Quality [x]WFL [] Impaired []DNT    Sensation [x]WFL [] Impaired []DNT    Cough []WFL [] Impaired [x]DNT    Other: []WFL [] Impaired []DNT    Other: []WFL [] Impaired []DNT        PATIENT WAS EVALUATED USING:  Thin, puree, hard solids     ORAL PHASE: [x] WFL  [] Impaired   [] Loss of bolus from lips [] Impaired AP movement [] Pocketing Left   [] Pocketing Right  [] Swallow: [] Is indicated to further assess    [x] Is NOT indicated at this time; Will recommend as        appropriate. DIET RECOMMENDATIONS:  Regular with thin     STRATEGIES: [] Strategies pending MBS results. [x] Full upright position  [x] Small bite/sip [] No Straw [] Multiple Swallow  [] Chin tuck [] Head turn [x] Pulmonary monitoring [] Oral care after all meals  [] Supervision  [] Medication in applesauce []Direct 1:1 Supervision  [] Spoon all liquids [] Alternate solid / liquid [] Limit distractions [] Monitor for fatigue  [] PMV in place for all po [] OTHER:      EDUCATION:   Learner: [x]Patient [x] Significant other [] Son/Daughter [] Parent     [] Other:   Education: [x] Reviewed results and recommendations of this evaluation     [x] Reviewed diet and strategies     [x] Reviewed signs, symptoms and risk of aspiration     [] Demonstrated how to thick liquid appropriately. [x] Reviewed goals and Plan of Care     [] OTHER:   Method: [x] Discussion [x] Demonstration [] Hand-out     [] OTHER:   Evaluation of Education:     [x] Verbalizes understanding [x] Demonstrates with assistance     [] Demonstrates without assistance []Needs further instruction     [] No evidence of learning  [] Family not present    PATIENT GOALS: [] Pt did not state. Will further assess during treatment. [] Return to the least restricted diet possible     [x] Return to previous level of function     [] OTHER:    PLAN / TREATMENT RECOMMENDATIONS:  [x] No further speech therapy services indicated.          Nicole Voss M.S. Anup

## 2018-11-08 NOTE — PROGRESS NOTES
Hospitalist Progress Note    Patient:  Armand To      Unit/Bed:6K-06/006-A    YOB: 1950    MRN: [id number]       Acct: [de-identified]     PCP: Zen GREENBERG    Date of Admission: 11/5/2018    Chief Complaint: chest pain    Hospital Course:     Please see H/P for details. In summary, this is a 77 y.o. Male, w PMH DM type 2, diet controlled, HTN, former smoker, COPD, was admitted to [facility] on 11/5/18 for chest pain. Pt initially presented to [facility] on 11/5/18 and was transferred to [facility] for further evaluation of chest pain. Also, per H/P has intermittent SOBE x 5 years. Troponin on admission was normal, EKG on admission rate 67, normal sinus rhythm, left axis deviation, incomplete rt bundle branch block, LVH w repolarization, when compared w EKG 5/25/18, no significant change. Pt was started on asa 325 mg po daily, lovenox 100 mg SQ q 12 hours, NTG. Echo, chest CTA and lexiscan stress test ordered. Of note, per H/P pt also takes Mobic, GI pathology one of differentials on H/P as caused of chest pain. Pt is a fomer smoker, used to smoke 1 ppd x 20 years, quit in 2000 per pt.       lexiscan stress test came back nuclear images suggestive for mild myocardial ischemia in the inferior wall. Chest CTA showed no PE, (+) mild ( 3.3 cm ) descending thoracic aortic aneurysm. Cardiology and Cardiovascular surgery consult ordered. Echo done on 11/6/18 showed EF 55-60%, no regional left ventricular wall motion abnormalities and wall thickness was within normal range with no evidence of aortic stenosis, no evidence of aortic regurgitation. On 11/7/18, pt was c/o chronic dysphagia x 1 year and sore throat. Rapid strep negative. GI consulted. Started on pantoprazole 40 mg po daily.  Pt was seen by  LAURA Boogie CNP, who recommend continue pantoprazole, per GI, no plans for EGD until cardiac has been cleared, this may also be completed as an outpatient, barium esophagram ordered by GI which showed small sliding hiatal hernia but otherwise unremarkable esophagram.    Subjective:     Pt seen and examined. Pt reports still having sore throat, feels throat dry. Cough improving. Denies chest pain, sob, palpitations, fever, chills, drooling, choking, odynophagia. Medications:  Reviewed    Infusion Medications    dextrose      dextrose      sodium chloride 40 mL/hr at 11/05/18 2353     Scheduled Medications    insulin lispro  0-18 Units Subcutaneous TID WC    insulin lispro  0-9 Units Subcutaneous Nightly    pantoprazole  40 mg Oral QAM AC    guaiFENesin  600 mg Oral BID    atorvastatin  40 mg Oral Daily    isosorbide mononitrate  30 mg Oral Daily    spironolactone  25 mg Oral Daily    amLODIPine  10 mg Oral Daily    hydrochlorothiazide  25 mg Oral Daily    cilostazol  100 mg Oral BID    sodium chloride flush  10 mL Intravenous 2 times per day    labetalol  10 mg Intravenous Q6H    aspirin  325 mg Oral Daily    enoxaparin  1 mg/kg Subcutaneous Q12H     PRN Meds: nitroGLYCERIN, sodium chloride flush, magnesium hydroxide, ondansetron, potassium chloride **OR** potassium chloride **OR** potassium chloride, potassium chloride, magnesium sulfate, glucose, dextrose, glucagon (rDNA), dextrose, glucose, dextrose, glucagon (rDNA), dextrose, ipratropium-albuterol, traMADol      Intake/Output Summary (Last 24 hours) at 11/08/18 0720  Last data filed at 11/08/18 0556   Gross per 24 hour   Intake             1164 ml   Output                0 ml   Net             1164 ml       Diet:  DIET CARB CONTROL; No Added Salt (3-4 GM)    Exam:  /63   Pulse 77   Temp 97.8 °F (36.6 °C) (Oral)   Resp 18   Ht 5' 10\" (1.778 m)   Wt 214 lb (97.1 kg)   SpO2 97%   BMI 30.71 kg/m²     General appearance: alert, not in acute distress   HEENT: clear oral mucosa, no oropharyngeal erythema, no tonsillar swelling or erythema. Neck: Supple, with full range of motion.  No

## 2018-11-08 NOTE — PROGRESS NOTES
Hospital Follow Up Note  [address] 1950 11/8/2018 2:04 PM  S        SLP at bedside for evaluation. Patient states cardiology to see and may need cath    O.    Vitals:    11/08/18 1155   BP: (!) 156/81   Pulse: 76   Resp: 16   Temp: 97.6 °F (36.4 °C)   SpO2: 99%            A&O         Heart  RRR         Lungs CTAB          ABD S/NT/ND/+BS         Ext No LLE     A. 77year old male seen as a new consult for dysphagia. He was admitted 11-5-18 as a transfer from [facility] with chest pain. Stress test abnormal and cardiology consulted. Patient reported dysphagia/globus for 1 year. Started on Pantoprazole 40mg daily. GI consulted. Dysphagia/globus x 1 year    Esophagram 11-8-18 Small sliding hiatal hernia but otherwise unremarkable   SLP eval results pending     Chest pain, abnormal stress test, cardiology consult pending   CTA chest 11-6-18 No pulmonary embolism. No acute intrathoracic findings. Aneurysm of the descending thoracic aor    Hypertension   DM type II   COPD      Osteoarthritis    PTSD   PVD         P. Continue Pantoprazole 40mg daily   Elevate head of bed at night   Await SLP evaluation   No plans for EGD until cardiac has been cleared.  This may also be completed as an outpatient  Follow       Assessment and planning discussed with Dr Kasia Watt, APRN, CNP

## 2018-11-09 ENCOUNTER — APPOINTMENT (OUTPATIENT)
Dept: CARDIAC CATH/INVASIVE PROCEDURES | Age: 68
DRG: 287 | End: 2018-11-09
Attending: INTERNAL MEDICINE
Payer: MEDICARE

## 2018-11-09 PROBLEM — I65.22 INTERNAL CAROTID ARTERY STENOSIS, LEFT: Status: ACTIVE | Noted: 2018-11-09

## 2018-11-09 LAB
GLUCOSE BLD-MCNC: 112 MG/DL (ref 70–108)
GLUCOSE BLD-MCNC: 141 MG/DL (ref 70–108)
GLUCOSE BLD-MCNC: 177 MG/DL (ref 70–108)

## 2018-11-09 PROCEDURE — 6370000000 HC RX 637 (ALT 250 FOR IP): Performed by: FAMILY MEDICINE

## 2018-11-09 PROCEDURE — B41J1ZZ FLUOROSCOPY OF OTHER LOWER ARTERIES USING LOW OSMOLAR CONTRAST: ICD-10-PCS | Performed by: INTERNAL MEDICINE

## 2018-11-09 PROCEDURE — B2111ZZ FLUOROSCOPY OF MULTIPLE CORONARY ARTERIES USING LOW OSMOLAR CONTRAST: ICD-10-PCS | Performed by: INTERNAL MEDICINE

## 2018-11-09 PROCEDURE — 4A023N7 MEASUREMENT OF CARDIAC SAMPLING AND PRESSURE, LEFT HEART, PERCUTANEOUS APPROACH: ICD-10-PCS | Performed by: INTERNAL MEDICINE

## 2018-11-09 PROCEDURE — 6360000004 HC RX CONTRAST MEDICATION: Performed by: INTERNAL MEDICINE

## 2018-11-09 PROCEDURE — 6360000002 HC RX W HCPCS

## 2018-11-09 PROCEDURE — 99232 SBSQ HOSP IP/OBS MODERATE 35: CPT | Performed by: FAMILY MEDICINE

## 2018-11-09 PROCEDURE — 2500000003 HC RX 250 WO HCPCS

## 2018-11-09 PROCEDURE — C1753 CATH, INTRAVAS ULTRASOUND: HCPCS

## 2018-11-09 PROCEDURE — 6370000000 HC RX 637 (ALT 250 FOR IP): Performed by: INTERNAL MEDICINE

## 2018-11-09 PROCEDURE — C1769 GUIDE WIRE: HCPCS

## 2018-11-09 PROCEDURE — 1200000003 HC TELEMETRY R&B

## 2018-11-09 PROCEDURE — 2580000003 HC RX 258: Performed by: INTERNAL MEDICINE

## 2018-11-09 PROCEDURE — 2709999900 HC NON-CHARGEABLE SUPPLY

## 2018-11-09 PROCEDURE — 36252 INS CATH REN ART 1ST BILAT: CPT | Performed by: INTERNAL MEDICINE

## 2018-11-09 PROCEDURE — B4101ZZ FLUOROSCOPY OF ABDOMINAL AORTA USING LOW OSMOLAR CONTRAST: ICD-10-PCS | Performed by: INTERNAL MEDICINE

## 2018-11-09 PROCEDURE — C1894 INTRO/SHEATH, NON-LASER: HCPCS

## 2018-11-09 PROCEDURE — 93458 L HRT ARTERY/VENTRICLE ANGIO: CPT | Performed by: INTERNAL MEDICINE

## 2018-11-09 PROCEDURE — B4181ZZ FLUOROSCOPY OF BILATERAL RENAL ARTERIES USING LOW OSMOLAR CONTRAST: ICD-10-PCS | Performed by: INTERNAL MEDICINE

## 2018-11-09 PROCEDURE — C1887 CATHETER, GUIDING: HCPCS

## 2018-11-09 PROCEDURE — 82948 REAGENT STRIP/BLOOD GLUCOSE: CPT

## 2018-11-09 PROCEDURE — B2151ZZ FLUOROSCOPY OF LEFT HEART USING LOW OSMOLAR CONTRAST: ICD-10-PCS | Performed by: INTERNAL MEDICINE

## 2018-11-09 RX ORDER — ONDANSETRON 2 MG/ML
4 INJECTION INTRAMUSCULAR; INTRAVENOUS EVERY 6 HOURS PRN
Status: DISCONTINUED | OUTPATIENT
Start: 2018-11-09 | End: 2018-11-10 | Stop reason: HOSPADM

## 2018-11-09 RX ORDER — ISOSORBIDE MONONITRATE 60 MG/1
60 TABLET, EXTENDED RELEASE ORAL DAILY
Status: DISCONTINUED | OUTPATIENT
Start: 2018-11-09 | End: 2018-11-10 | Stop reason: HOSPADM

## 2018-11-09 RX ORDER — SODIUM CHLORIDE 0.9 % (FLUSH) 0.9 %
10 SYRINGE (ML) INJECTION PRN
Status: DISCONTINUED | OUTPATIENT
Start: 2018-11-09 | End: 2018-11-09 | Stop reason: SDUPTHER

## 2018-11-09 RX ORDER — ATROPINE SULFATE 0.4 MG/ML
0.5 AMPUL (ML) INJECTION
Status: ACTIVE | OUTPATIENT
Start: 2018-11-09 | End: 2018-11-09

## 2018-11-09 RX ORDER — ACETAMINOPHEN 325 MG/1
650 TABLET ORAL EVERY 4 HOURS PRN
Status: DISCONTINUED | OUTPATIENT
Start: 2018-11-09 | End: 2018-11-10 | Stop reason: HOSPADM

## 2018-11-09 RX ORDER — SODIUM CHLORIDE 9 MG/ML
100 INJECTION, SOLUTION INTRAVENOUS CONTINUOUS
Status: ACTIVE | OUTPATIENT
Start: 2018-11-09 | End: 2018-11-09

## 2018-11-09 RX ORDER — SODIUM CHLORIDE 0.9 % (FLUSH) 0.9 %
10 SYRINGE (ML) INJECTION EVERY 12 HOURS SCHEDULED
Status: DISCONTINUED | OUTPATIENT
Start: 2018-11-09 | End: 2018-11-09 | Stop reason: SDUPTHER

## 2018-11-09 RX ORDER — PANTOPRAZOLE SODIUM 40 MG/1
40 TABLET, DELAYED RELEASE ORAL
Qty: 30 TABLET | Refills: 11 | Status: SHIPPED | OUTPATIENT
Start: 2018-11-09

## 2018-11-09 RX ADMIN — METOPROLOL TARTRATE 25 MG: 25 TABLET ORAL at 21:52

## 2018-11-09 RX ADMIN — GUAIFENESIN 600 MG: 600 TABLET, EXTENDED RELEASE ORAL at 21:52

## 2018-11-09 RX ADMIN — CILOSTAZOL 100 MG: 100 TABLET ORAL at 21:52

## 2018-11-09 RX ADMIN — SODIUM CHLORIDE 100 ML/HR: 9 INJECTION, SOLUTION INTRAVENOUS at 10:23

## 2018-11-09 RX ADMIN — GUAIFENESIN 600 MG: 600 TABLET, EXTENDED RELEASE ORAL at 11:17

## 2018-11-09 RX ADMIN — SPIRONOLACTONE 25 MG: 25 TABLET ORAL at 11:16

## 2018-11-09 RX ADMIN — SODIUM CHLORIDE: 9 INJECTION, SOLUTION INTRAVENOUS at 03:57

## 2018-11-09 RX ADMIN — AMLODIPINE BESYLATE 10 MG: 10 TABLET ORAL at 11:20

## 2018-11-09 RX ADMIN — CILOSTAZOL 100 MG: 100 TABLET ORAL at 11:17

## 2018-11-09 RX ADMIN — HYDROCHLOROTHIAZIDE 25 MG: 25 TABLET ORAL at 11:16

## 2018-11-09 RX ADMIN — PANTOPRAZOLE SODIUM 40 MG: 40 TABLET, DELAYED RELEASE ORAL at 11:19

## 2018-11-09 RX ADMIN — ISOSORBIDE MONONITRATE 60 MG: 60 TABLET ORAL at 11:16

## 2018-11-09 RX ADMIN — LIDOCAINE HYDROCHLORIDE 15 ML: 20 SOLUTION ORAL; TOPICAL at 18:28

## 2018-11-09 RX ADMIN — GLIPIZIDE 2.5 MG: 5 TABLET ORAL at 16:55

## 2018-11-09 RX ADMIN — IOPAMIDOL 240 ML: 755 INJECTION, SOLUTION INTRAVENOUS at 07:53

## 2018-11-09 RX ADMIN — GLIPIZIDE 2.5 MG: 5 TABLET ORAL at 11:17

## 2018-11-09 RX ADMIN — ATORVASTATIN CALCIUM 40 MG: 40 TABLET, FILM COATED ORAL at 21:52

## 2018-11-09 RX ADMIN — METOPROLOL TARTRATE 25 MG: 25 TABLET ORAL at 11:19

## 2018-11-09 RX ADMIN — ASPIRIN 325 MG: 325 TABLET, DELAYED RELEASE ORAL at 08:00

## 2018-11-09 ASSESSMENT — PAIN SCALES - GENERAL
PAINLEVEL_OUTOF10: 0
PAINLEVEL_OUTOF10: 0

## 2018-11-09 NOTE — PROGRESS NOTES
Carotid duplex abnormal suggestive of LICA stenosis. Has bilateral calf intermittent claudication. Had cardiac cath by Dr Darius Leigh today.    After Dr. Darius Leigh discharges patient, can have carotid and PVD workup as outpatient    Plan: Sign off for now, can follow up in my office for carotid and PVD work up in 1-2 week if OK Dr. Darius Leigh

## 2018-11-09 NOTE — PROGRESS NOTES
cilostazol  100 mg Oral BID    sodium chloride flush  10 mL Intravenous 2 times per day    labetalol  10 mg Intravenous Q6H    aspirin  325 mg Oral Daily    enoxaparin  1 mg/kg Subcutaneous Q12H     PRN Meds: polyethylene glycol, lidocaine viscous, nitroGLYCERIN, sodium chloride flush, magnesium hydroxide, ondansetron, potassium chloride **OR** potassium chloride **OR** potassium chloride, potassium chloride, magnesium sulfate, glucose, dextrose, glucagon (rDNA), dextrose, glucose, dextrose, glucagon (rDNA), dextrose, ipratropium-albuterol, traMADol      Intake/Output Summary (Last 24 hours) at 11/09/18 0826  Last data filed at 11/08/18 2020   Gross per 24 hour   Intake              716 ml   Output                0 ml   Net              716 ml       Diet:  DIET CARB CONTROL; No Added Salt (3-4 GM)    Exam:  BP (P) 110/63   Pulse (P) 72   Temp (P) 97.8 °F (36.6 °C) (Oral)   Resp (P) 16   Ht 5' 10\" (1.778 m)   Wt 212 lb 11.2 oz (96.5 kg)   SpO2 95%   BMI 30.52 kg/m²     General appearance: alert, not in acute distress   HEENT: clear oral mucosa, no oropharyngeal erythema, no tonsillar swelling or erythema.    Neck: Supple, with full range of motion. No jugular venous distention. Trachea midline. Respiratory:  Normal respiratory effort. Clear to auscultation, bilaterally without Rales/Wheezes/Rhonchi. Cardiovascular: Regular rate and rhythm with normal S1/S2 without murmurs, rubs or gallops. Abdomen: Soft, non-tender, non-distended with normal bowel sounds. Musculoskeletal: passive and active ROM x 4 extremities. Skin: Skin color, texture, turgor normal.  No rashes or lesions. Psychiatric: Alert and oriented, thought content appropriate, normal insight  Exam of extremities: peripheral pulses normal, no pedal edema, no clubbing or cyanosis     Labs:   No results for input(s): WBC, HGB, HCT, PLT in the last 72 hours.   No results for input(s): NA, K, CL, CO2, BUN, CREATININE, CALCIUM, PHOS in the last 72 hours.    Invalid input(s): MAGNES  No results for input(s): AST, ALT, BILIDIR, BILITOT, ALKPHOS in the last 72 hours. No results for input(s): INR in the last 72 hours. No results for input(s): Carolin Stack in the last 72 hours. Urinalysis:    Lab Results   Component Value Date    NITRU NEGATIVE 11/06/2018    WBCUA 0-2 11/06/2018    BACTERIA FEW 11/06/2018    RBCUA NONE SEEN 11/06/2018    BLOODU NEGATIVE 11/06/2018    SPECGRAV 1.015 11/06/2018       Radiology:  FL ESOPHAGRAM   Final Result      Small sliding hiatal hernia but otherwise unremarkable esophagram.      Final report electronically signed by Dr. Tomasa Lewis on 11/8/2018 9:13 AM      CTA CHEST W WO CONTRAST   Final Result   1. No pulmonary embolism. 2. No acute intrathoracic findings. 3. Aneurysm of the descending thoracic aorta. Final report electronically signed by Dr. Tomasa Lewis on 11/6/2018 11:11 AM      VL DUP CAROTID BILATERAL    (Results Pending)           Assessment/Plan: This is a 77 y.o. Male admitted for chest pain     1. CAD s/p [facility] 11/9/2018        -pt presented w chest pain on 11/5/18, lexiscan stress test on 11/6/18 came back abn.   -cardio on-board. Appreciate Dr. Ayana Frederick input. S/p [facility] 11/9/2018. -on metoprolol, imdur, statin, asa 325 mg po daily  -chest CTA negative for PE  -Echo 11/6/18 showed EF 55-60%, no regional left ventricular wall motion abnormalities and wall thickness was within normal range with no evidence of aortic stenosis, no evidence of aortic regurgitation.           2. Left ICA stenosis    -carotid US 11/8/18: stenosis of at least 70% demonstrated within the proximal left internal carotid artery, no sonographic evidence of significant flow-limiting stenosis within the proximal right internal carotid artery.   -Dr. Sylvia Hung ( Cardiothoracic surgery) saw the pt on 11/9/2018 who recommend f/u in 1-2 weeks w Dr. Sylvia Hung office in 1-2 weeks for carotid and PVD workup as outpatient. Appreciate Dr. Amisha Oneill input. 3. Dysphagia, chronic     -w globus sensation  -GI on-board. Appreciate LAURA Whitney CNP input  -cont protonix 40 mg po daily  -barium swallow 11/8/18: small hiatal hernia otherwise U/R. -speech eval done, recommendation as above  -GI scheduled outpatient EGD on 11/15/18 w Dr Neal Austin at [facility]      4. Sore throat, likely viral vs ?GERD-associated, improving      -throat looks normal  -rapid strep test negative   -cont lidocaine viscous      5. Dry cough, improving      -chest CTA showed no lung consolidations  -cont Mucinex      6. Aneurysm on descending thoracic aorta, w atherosclerotic calcifications and mural thrombus     -cardiothoracic sx on-board. Appreciate CV surgery input.   -seen by Natalie Schulz PA-C ( Cardiothoracic surgery) on 11/8/18, who recommend hold off on any surgical intervention for mildly dilated thoracic aneurysm, F/U with PCP for consideration of repeat CT in one year. 7. HTN, controlled     -cont amlodipine, HCTZ, aldactone. labetalol prn  -metoprolol added by cardio after [facility]  -VS per protocol  -low salt diet      8. DM type 2, fairly controlled     -A1C on 11/5/18: 7.6. accu-check BG not controlled. -increased SSI to high dose on 11/7/18, accu-check BG still above the goal after SSI dose increased, started glipizide 2.5 mg po bid. accu-check improving, will continue to monitor for now, will adjust glipizide dose prn.   -cont accu-check   -carb-control diet      9. H/o COPD     -not on exacerbation   -monitor. duoneb prn      10. Former smoker     6.  H/o intermittent claudication    -on cilostazol        Diet: DIET CARB CONTROL; No Added Salt (3-4 GM)    DVT prophylaxis: [x] Lovenox                                 [] SCDs                                 [] SQ Heparin                                 [] Encourage ambulation           [] Already on Anticoagulation     Disposition:    [] Home       [] TCU       [] Rehab       []

## 2018-11-09 NOTE — FLOWSHEET NOTE
11/09/18 1324   Encounter Summary   Services provided to: Patient and family together   Referral/Consult From: Patient; Family; Other    Support System Spouse; Family members; Spiritism/estela community   [facility]   Contact Spiritism Completed   Continue Visiting Yes  (11/9/18)   Complexity of Encounter Moderate   Length of Encounter 30 minutes   Spiritual/Yazidi   Type Spiritual support   Assessment Calm; Approachable   Intervention Active listening;Nurtured hope   Outcome Comfort;Expressed gratitude   S- During my contact with the patient and the family, I wanted to assess what their       spiritual and emotional needs were. O- The pt was in the bed and his wife was supportively in the chair by his side. The pt was receptive to my presence. 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 The pt shared some of his struggles and how he knows he has to take care of his body. We had a very good conversation. A- The pt knows that he has to get rest from his duties as a . I offered encouraging words and a prayer of comfort and peace. P-  Continued support would be helpful in order to meet the future Spiritual needs of the         patient.

## 2018-11-10 ENCOUNTER — TELEPHONE (OUTPATIENT)
Dept: CARDIOTHORACIC SURGERY | Age: 68
End: 2018-11-10

## 2018-11-10 VITALS
HEIGHT: 70 IN | WEIGHT: 212.7 LBS | OXYGEN SATURATION: 98 % | DIASTOLIC BLOOD PRESSURE: 70 MMHG | BODY MASS INDEX: 30.45 KG/M2 | RESPIRATION RATE: 18 BRPM | TEMPERATURE: 97.6 F | HEART RATE: 78 BPM | SYSTOLIC BLOOD PRESSURE: 146 MMHG

## 2018-11-10 LAB
GLUCOSE BLD-MCNC: 169 MG/DL (ref 70–108)
GLUCOSE BLD-MCNC: 171 MG/DL (ref 70–108)

## 2018-11-10 PROCEDURE — 6360000002 HC RX W HCPCS: Performed by: INTERNAL MEDICINE

## 2018-11-10 PROCEDURE — 6370000000 HC RX 637 (ALT 250 FOR IP): Performed by: INTERNAL MEDICINE

## 2018-11-10 PROCEDURE — 6370000000 HC RX 637 (ALT 250 FOR IP): Performed by: FAMILY MEDICINE

## 2018-11-10 PROCEDURE — 82948 REAGENT STRIP/BLOOD GLUCOSE: CPT

## 2018-11-10 PROCEDURE — 99239 HOSP IP/OBS DSCHRG MGMT >30: CPT | Performed by: FAMILY MEDICINE

## 2018-11-10 PROCEDURE — 99232 SBSQ HOSP IP/OBS MODERATE 35: CPT | Performed by: FAMILY MEDICINE

## 2018-11-10 RX ORDER — METFORMIN HYDROCHLORIDE 500 MG/1
500 TABLET, EXTENDED RELEASE ORAL
Qty: 30 TABLET | Refills: 0 | Status: ON HOLD | OUTPATIENT
Start: 2018-11-10 | End: 2022-04-06 | Stop reason: ALTCHOICE

## 2018-11-10 RX ORDER — ISOSORBIDE MONONITRATE 60 MG/1
60 TABLET, EXTENDED RELEASE ORAL DAILY
Qty: 30 TABLET | Refills: 0 | Status: ON HOLD | OUTPATIENT
Start: 2018-11-11 | End: 2018-12-20 | Stop reason: HOSPADM

## 2018-11-10 RX ORDER — ATORVASTATIN CALCIUM 80 MG/1
80 TABLET, FILM COATED ORAL DAILY
Qty: 30 TABLET | Refills: 0 | Status: SHIPPED | OUTPATIENT
Start: 2018-11-10

## 2018-11-10 RX ADMIN — ISOSORBIDE MONONITRATE 60 MG: 60 TABLET ORAL at 08:38

## 2018-11-10 RX ADMIN — SPIRONOLACTONE 25 MG: 25 TABLET ORAL at 08:39

## 2018-11-10 RX ADMIN — METOPROLOL TARTRATE 25 MG: 25 TABLET ORAL at 08:38

## 2018-11-10 RX ADMIN — CILOSTAZOL 100 MG: 100 TABLET ORAL at 08:38

## 2018-11-10 RX ADMIN — PANTOPRAZOLE SODIUM 40 MG: 40 TABLET, DELAYED RELEASE ORAL at 05:28

## 2018-11-10 RX ADMIN — ASPIRIN 325 MG: 325 TABLET, DELAYED RELEASE ORAL at 08:38

## 2018-11-10 RX ADMIN — GUAIFENESIN 600 MG: 600 TABLET, EXTENDED RELEASE ORAL at 08:38

## 2018-11-10 RX ADMIN — HYDROCHLOROTHIAZIDE 25 MG: 25 TABLET ORAL at 08:39

## 2018-11-10 RX ADMIN — POLYETHYLENE GLYCOL 3350 17 G: 17 POWDER, FOR SOLUTION ORAL at 08:38

## 2018-11-10 RX ADMIN — GLIPIZIDE 2.5 MG: 5 TABLET ORAL at 08:39

## 2018-11-10 RX ADMIN — AMLODIPINE BESYLATE 10 MG: 10 TABLET ORAL at 08:39

## 2018-11-10 RX ADMIN — ENOXAPARIN SODIUM 100 MG: 100 INJECTION SUBCUTANEOUS at 05:28

## 2018-11-10 ASSESSMENT — PAIN DESCRIPTION - LOCATION: LOCATION: CHEST

## 2018-11-10 ASSESSMENT — PAIN DESCRIPTION - PAIN TYPE: TYPE: ACUTE PAIN

## 2018-11-10 ASSESSMENT — PAIN SCALES - GENERAL: PAINLEVEL_OUTOF10: 3

## 2018-11-10 ASSESSMENT — PAIN DESCRIPTION - ORIENTATION: ORIENTATION: LEFT

## 2018-11-10 ASSESSMENT — PAIN DESCRIPTION - FREQUENCY: FREQUENCY: CONTINUOUS

## 2018-11-10 ASSESSMENT — PAIN DESCRIPTION - DESCRIPTORS: DESCRIPTORS: DULL

## 2018-11-10 NOTE — PROGRESS NOTES
32.1 11/06/2018    RDW 12.6 05/25/2016     11/06/2018    MPV 13.3 11/06/2018     BMP  Lab Results   Component Value Date     11/06/2018    K 3.6 11/06/2018     11/06/2018    CO2 23 11/06/2018    BUN 11 11/06/2018    CREATININE 0.8 11/06/2018    CALCIUM 8.9 11/06/2018      COAG PROFILE:  Lab Results   Component Value Date    INR 0.95 11/05/2018       Assessment:     Active Problems:    Chest pain    Chronic obstructive pulmonary disease (HCC)    PTSD (post-traumatic stress disorder)    Osteoarthritis of multiple joints    Peripheral vascular disease (HCC)    Type 2 diabetes mellitus, without long-term current use of insulin (HCC)    Descending thoracic aortic aneurysm (HCC)    Internal carotid artery stenosis, left  Resolved Problems:    * No resolved hospital problems.  *      Plan:   Patient feeling well no chest pain    cath had lad and diagonal disease    may need ivus of lad    severe PVD    may need intervention    severe renal artery stenosis may intervention    ok for discharge will be seen at  Office

## 2018-11-10 NOTE — PROGRESS NOTES
barium esophagram ordered by GI which showed small sliding hiatal hernia but otherwise unremarkable esophagram. Pt had speech eval on 11/8/18, who recommend regular diet with thin liquids, upright position, extra sauces/gravies and GERD regimen to target possible reflux, patient would benefit from EGD to follow to further target GI needs.     Pt was seen by Dr. Isha Kang (Cardiology) on 11/8/18, pt underwent Left Heart Cathbilateral selective renal angiogram abd aortogram on 11/9/2018.     Pt was seen by Toby Greenberg PA-C ( Cardiothoracic surgery) on 11/8/18, who recommend hold off on any surgical intervention for mildly dilated thoracic aneurysm, F/U with PCP for consideration of repeat CT in one year, Carotid duplex for bilat carotid bruits.     Carotid US done 11/8/18, showed stenosis of at least 70% demonstrated within the proximal left internal carotid artery, no sonographic evidence of significant flow-limiting stenosis within the proximal right internal carotid artery. Dr. Conrado Rodgers ( Cardiothoracic surgery) saw the pt on 11/9/2018 who recommend f/u in 1-2 weeks w Dr. Conrado Rodgers office in 1-2 weeks for carotid and PVD workup as outpatient.          Subjective:       Pt seen and examined. Pt denies chest pin, sob, palpitations, N/V, abd pain. Sore throat improving. Dry cough improving per pt.        Medications:  Reviewed    Infusion Medications    dextrose      dextrose      sodium chloride 40 mL/hr at 11/09/18 0357     Scheduled Medications    metoprolol tartrate  25 mg Oral BID    isosorbide mononitrate  60 mg Oral Daily    glipiZIDE  2.5 mg Oral BID AC    insulin lispro  0-18 Units Subcutaneous TID WC    insulin lispro  0-9 Units Subcutaneous Nightly    pantoprazole  40 mg Oral QAM AC    guaiFENesin  600 mg Oral BID    atorvastatin  40 mg Oral Daily    spironolactone  25 mg Oral Daily    amLODIPine  10 mg Oral Daily    hydrochlorothiazide  25 mg Oral Daily    cilostazol  100 mg Oral BID   

## 2018-11-10 NOTE — DISCHARGE SUMMARY
came back nuclear images suggestive for mild myocardial ischemia in the inferior wall. Chest CTA showed no PE, (+) mild ( 3.3 cm ) descending thoracic aortic aneurysm. Cardiology and Cardiovascular surgery consult ordered. Echo done on 11/6/18 showed EF 55-60%, no regional left ventricular wall motion abnormalities and wall thickness was within normal range with no evidence of aortic stenosis, no evidence of aortic regurgitation.      On 11/7/18, patient was c/o chronic dysphagia x 1 year and sore throat, accompanied by dry cough. Rapid strep negative. GI consulted. Started on pantoprazole 40 mg po daily. Patient was seen by LAURA Moreira CNP, who recommend continue pantoprazole, per GI, no plans for EGD until cardiac has been cleared, this may also be completed as an outpatient, barium esophagram ordered by GI which showed small sliding hiatal hernia but otherwise unremarkable esophagram. Patient had speech evaluation on 11/8/18, who recommend regular diet with thin liquids, upright position, extra sauces/gravies and GERD regimen to target possible reflux, patient would benefit from EGD to follow to further target GI needs. Sore throat and dry cough improving with pantoprazole.      Pt was seen by Dr. Arjun Coker (Cardiology) on 11/8/18, pt underwent Left Heart Cathbilateral selective renal angiogram abdominal aortogram on 11/9/2018.  Per Dr. Arjun Coker, patient has CAD ( LAD and diagonal disease) and severe right renal artery stenosis.      Pt was seen by Governor Mac PA-C ( Cardiothoracic surgery) on 11/8/18, who recommend hold off on any surgical intervention for mildly dilated thoracic aneurysm, F/U with PCP for consideration of repeat CT in one year, Carotid duplex for bilateral carotid bruits.     Carotid US done 11/8/18, showed stenosis of at least 70% demonstrated within the proximal left internal carotid artery, no sonographic evidence of significant flow-limiting stenosis within the proximal right S1/S2 without murmurs, rubs or gallops. Abdomen: Soft, non-tender, non-distended with normal bowel sounds. Musculoskeletal: passive and active ROM x 4 extremities. Skin: Skin color, texture, turgor normal.  No rashes or lesions. Psychiatric: Alert and oriented, thought content appropriate, normal insight  Exam of extremities: peripheral pulses normal, no pedal edema, no clubbing or cyanosis       Labs: For convenience and continuity at follow-up the following most recent labs are provided:      CBC:    Lab Results   Component Value Date    WBC 10.5 11/06/2018    HGB 12.3 11/06/2018    HCT 38.3 11/06/2018     11/06/2018       Renal:    Lab Results   Component Value Date     11/06/2018    K 3.6 11/06/2018     11/06/2018    CO2 23 11/06/2018    BUN 11 11/06/2018    CREATININE 0.8 11/06/2018    CALCIUM 8.9 11/06/2018         Significant Diagnostic Studies    Radiology:   VL DUP CAROTID BILATERAL   Final Result   1. No sonographic evidence of significant flow-limiting stenosis within the proximal right internal carotid artery. 2. There is stenosis of at least 70% demonstrated within the proximal left internal carotid artery. Recommend further characterization with CTA of the neck. 3. Antegrade flow within the vertebral arteries bilaterally. **This report has been created using voice recognition software. It may contain minor errors which are inherent in voice recognition technology. **      Final report electronically signed by Dr. Cory Harris on 11/9/2018 11:35 AM      FL ESOPHAGRAM   Final Result      Small sliding hiatal hernia but otherwise unremarkable esophagram.      Final report electronically signed by Dr. Rivka Seals on 11/8/2018 9:13 AM      CTA CHEST W WO CONTRAST   Final Result   1. No pulmonary embolism. 2. No acute intrathoracic findings. 3. Aneurysm of the descending thoracic aorta.       Final report electronically signed by Dr. Rivka Seals on 11/6/2018

## 2018-11-11 LAB — THROAT/NOSE CULTURE: NORMAL

## 2018-11-13 NOTE — CONSULTS
4 hours as needed for Wheezing or Shortness of Breath   Yes Historical Provider, MD   Tiotropium Bromide-Olodaterol (STIOLTO RESPIMAT) 2.5-2.5 MCG/ACT AERS Inhale 2 puffs into the lungs daily   Yes Historical Provider, MD   Omega 3-6-9 Fatty Acids (GNP TRIPLE OMEGA COMPLEX PO) Take 2 capsules by mouth 2 times daily   Yes Historical Provider, MD   meloxicam (MOBIC) 15 MG tablet Take 15 mg by mouth daily   Yes Historical Provider, MD   ibuprofen (ADVIL;MOTRIN) 600 MG tablet Take 600 mg by mouth every 4 hours as needed for Pain   Yes Historical Provider, MD   NONFORMULARY 1 capsule daily Blood Boost   Yes Historical Provider, MD   hydrochlorothiazide (HYDRODIURIL) 25 MG tablet Take 25 mg by mouth daily    Historical Provider, MD   cilostazol (PLETAL) 100 MG tablet Take 100 mg by mouth 2 times daily    Historical Provider, MD   glimepiride (AMARYL) 1 MG tablet Take 1 mg by mouth every morning (before breakfast)    Historical Provider, MD   isosorbide mononitrate (IMDUR) 30 MG CR tablet Take 1 tablet by mouth daily 5/25/16   Enmanuel Farfan MD   nitroGLYCERIN (NITROSTAT) 0.4 MG SL tablet Place 1 tablet under the tongue every 5 minutes as needed for Chest pain 5/25/16   Enmanuel Farfan MD   spironolactone (ALDACTONE) 25 MG tablet Take 1 tablet by mouth daily 5/25/16   Enmanuel Farfan MD   amLODIPine (NORVASC) 10 MG tablet Take 1 tablet by mouth daily 5/25/16   Enmanuel Farfan MD   atorvastatin (LIPITOR) 40 MG tablet Take 40 mg by mouth daily    Historical Provider, MD   aspirin 81 MG EC tablet Take 81 mg by mouth daily    Historical Provider, MD       Vital Signs  Vitals:    11/08/18 1548   BP: 118/62   Pulse: 84   Resp: 16   Temp: 98.1 °F (36.7 °C)   SpO2: 96%       Physical:  Heart:  regular rate and rhythm  Lungs:  Clear  Abdomen:  Soft  Mental Status:  Alert and Oriented    Planned Procedure:  Left Heart Cath and Possible Percutaneous Coronary Intervention    Sedation/ Anesthesia Plan: Midazolam and Sublimaze    ASA
the throat, the ear or the nose. CHEST:  No chest wall tenderness. A few basal crackles. He has an S4,  2/6 systolic murmur. ABDOMEN:  Soft. GENITAL/RECTAL:  Deferred. EXTREMITIES:  Lower limbs, there was no edema, weak peripheral  pulsations. LABORATORY DATA:  Lab work showed potassium 3.6 and his BUN 11,  creatinine 0.6. The hemoglobin was 12.3, hematocrit was 38, cholesterol  169. The EKG showed sinus rhythm, LVH, and a strain pattern _____ ischemia. IMPRESSION:  1. The patient with chest pain, shortness of breath, abnormal stress  test.  The patient on multiple antianginal medication. The patient will  be considered for heart cath. 2.  Hypertensive cardiovascular disease. Mild elevation in blood  pressure. The patient could be considered for renal angiogram at the  time of the heart cath. 3.  Diabetes mellitus. 4.  Hypercholesterolemia. 5.  Possible peripheral vascular disease. 6.  Possible esophageal stricture. PLAN:  The patient will be scheduled for heart cath. The patient  understands the procedure, the benefits, the risks. Agrees to have it  done. Continue aggressive medical treatment, risk factor modification.    Further recommendation pending the results of the above test.        Jossy Grijalva M.D.    D: 11/13/2018 6:12:30       T: 11/13/2018 10:10:35     [email or web address]  Job#: [id number]     Doc#: [id number]    CC:
Neurological: Negative for dizziness, tremors, speech difficulty, weakness, numbness and headaches. Hematological: Negative for adenopathy. Does not bruise/bleed easily. Psychiatric/Behavioral: Negative for agitation, confusion, dysphoric mood, self-injury, sleep disturbance and suicidal ideas. The patient is not hyperactive. Physical Exam:   General appearance:  No apparent distress, appears stated age and cooperative. HEENT:  Normal cephalic, atraumatic without obvious deformity. Pupils equal, round, and reactive to light. Extra ocular muscles intact. Conjunctivae/corneas clear. Neck: Supple, with full range of motion. No jugular venous distention. Trachea midline. Bilat carotid bruits. Respiratory:  Normal respiratory effort. Clear to auscultation, bilaterally without rales/wheezes/rhonchi  Cardiovascular:  Regular rate and rhythm with Grade II/VI MARY LSB radiating up to neck  Abdomen: Soft, non-tender, non-distended with normal bowel sounds. Musculoskeletal:  No clubbing, cyanosis or edema bilaterally. Full range of motion without deformity. Skin: Skin color, texture, turgor normal.  No rashes or lesions. Neurologic:  Neurovascularly intact without any focal sensory/motor deficits. Cranial nerves: II-XII intact, grossly non-focal.  Psychiatric:  Alert and oriented, thought content appropriate, normal insight  Capillary Refill: Brisk,< 3 seconds   Peripheral Pulses: +2 palpable, equal bilaterally      Assessment:   Patient Active Problem List   Diagnosis    Essential hypertension    DJD (degenerative joint disease) of thoracic spine    Chest pain    Chronic obstructive pulmonary disease (HCC)    PTSD (post-traumatic stress disorder)    Osteoarthritis of multiple joints    Peripheral vascular disease ([address]    Type 2 diabetes mellitus, without long-term current use of insulin ([address]    Descending thoracic aortic aneurysm ([address]       Plan: 11/8/18  1.  Hold off on any surgical intervention

## 2018-12-03 ENCOUNTER — OFFICE VISIT (OUTPATIENT)
Dept: CARDIOTHORACIC SURGERY | Age: 68
End: 2018-12-03
Payer: OTHER GOVERNMENT

## 2018-12-03 VITALS
BODY MASS INDEX: 30.72 KG/M2 | SYSTOLIC BLOOD PRESSURE: 167 MMHG | WEIGHT: 214.6 LBS | DIASTOLIC BLOOD PRESSURE: 82 MMHG | HEART RATE: 67 BPM | HEIGHT: 70 IN

## 2018-12-03 DIAGNOSIS — I65.22 CAROTID ARTERY STENOSIS WITHOUT CEREBRAL INFARCTION, LEFT: ICD-10-CM

## 2018-12-03 DIAGNOSIS — I70.0: Primary | ICD-10-CM

## 2018-12-03 PROCEDURE — 99204 OFFICE O/P NEW MOD 45 MIN: CPT | Performed by: THORACIC SURGERY (CARDIOTHORACIC VASCULAR SURGERY)

## 2018-12-03 ASSESSMENT — ENCOUNTER SYMPTOMS
EYES NEGATIVE: 1
RESPIRATORY NEGATIVE: 1
ALLERGIC/IMMUNOLOGIC NEGATIVE: 1
GASTROINTESTINAL NEGATIVE: 1

## 2018-12-03 NOTE — PROGRESS NOTES
[address] [facility]  Dept: [phone]  Dept Fax: [phone]: [phone]    Visit Date: 12/3/2018    Mr. Dev Veliz is a 77 y.o.male  who presented for:  Chief Complaint   Patient presents with    Follow-Up from Hospital     80% R renal artery stensosis, 70% L carotid stensosis    Other     severe R SFA stenosis, chest pressure    Consultation       HPI:   HPI : Mr Nasreen Avila is a very pleasant 78 y/o male that underwent a cardiac cath by Dr. Arjun Coker and was found with 70% Left common iliac takeoff stenosis, 70% right proximal CFA-SFA calcific stenosis. Duplex showed over 27% LICA stenosis without any focal lateralizing neurologic deficit. Also found with possible right renal artery takeoff stenosis. Will obtain CTA abdominal aorta with runoff to feet, CTA neck and see patient back. Patient refers right leg 2 block claudication     Cardiac cath. The coronary angiogram showed the left main was patent, bifurcates to  the LAD and circumflex. Diffuse moderate disease of the mid circumflex  was noticed with about 60%, ostium of the obtuse marginal about 60%  narrowing. PDA of the circumflex was patent. Circumflex is codominant  artery. The LAD was patent proximally. Diffuse nonobstructive disease  in its mid course with between 50% to 60%. Distally, the LAD was  patent. First high diagonal artery, small caliber artery. Subtotal  stenosis at the ostium. Second obtuse marginal, which is a longer  branch and does have 95% stenosis at the ostium.     The RCA was also a dominant artery. The RCA is tortuous proximally,  thus exhibit about 60% to 70% narrowing proximally, about 70% narrowing  of the mid posterolateral branch of the RCA.   About 50% to 60% narrowing  at the ostium and mid portion of the PDA of the RCA.     The left renal angiogram showed nonobstructive disease at the ostium of  the spironolactone (ALDACTONE) 25 MG tablet, Take 1 tablet by mouth daily, Disp: 30 tablet, Rfl: 3    amLODIPine (NORVASC) 10 MG tablet, Take 1 tablet by mouth daily, Disp: 30 tablet, Rfl: 3    aspirin 81 MG EC tablet, Take 81 mg by mouth daily, Disp: , Rfl:     No Known Allergies    Past Medical History  Lucas Posey  has a past medical history of Arthritis; Asthma; Blood circulation, collateral; COPD (chronic obstructive pulmonary disease) ([facility] [facility] Diabetes mellitus ([facility] [address] [facility] Hyperlipidemia; and Hypertension. Social History  Lucas Posey  reports that he has quit smoking. He quit after 20.00 years of use. He has never used smokeless tobacco. He reports that he does not drink alcohol or use drugs. Family History  Stephen family history includes Asthma in his father; Cancer in his mother; Diabetes in his mother and sister; Heart Disease in his brother and sister. There is no family history of bicuspid aortic valve, aneurysms, heart transplant, pacemakers, defibrillators, or sudden cardiac death. Past Surgical History   Past Surgical History:   Procedure Laterality Date    CARDIAC CATHETERIZATION      COLONOSCOPY      EYE SURGERY      right eye for detached retina    TONSILLECTOMY         Subjective:     Review of Systems   Constitutional: Negative. HENT: Negative. Eyes: Negative. Respiratory: Negative. Cardiovascular: Negative. Gastrointestinal: Negative. Endocrine: Negative. Genitourinary: Negative. Musculoskeletal: Negative. Skin: Negative. Allergic/Immunologic: Negative. Neurological: Negative. Hematological: Negative. Psychiatric/Behavioral: Negative.         Objective:     BP (!) 167/82 (Site: Right Upper Arm)   Pulse 67   Ht 5' 10\" (1.778 m)   Wt 214 lb 9.6 oz (97.3 kg)   BMI 30.79 kg/m²     Wt Readings from Last 3 Encounters:   12/03/18 214 lb 9.6 oz (97.3 kg)   11/09/18 212 lb 11.2 oz (96.5 kg)   05/31/18 217 lb (98.4 kg)     BP Readings from Last 3 Encounters:   12/03/18 MD Leah Ordoñez MD      Age             77 year(s)     Sonographer           Katheryn Ge MD                                  Physician     Procedure    Type of Study      TTE procedure:ECHOCARDIOGRAM COMPLETE 2D W DOPPLER W COLOR. Procedure Date  Date: 11/06/2018 Start: 10:08 AM    Study Location: Echo Lab  Technical Quality: Adequate visualization    Indications:Chest pain. Additional Medical History:Ex smoker, COPD, arthritis, PVD, hypertension,  diabetic, family history of premature CAD, hyperlipidemia    Patient Status: Routine    Height: 70.08 inches Weight: 218.26 pounds BSA: 2.17 m^2 BMI: 31.25 kg/m^2    BP: 133/68 mmHg     Conclusions      Summary   Ejection fraction was estimated at 55-60%. There were no regional left ventricular wall motion abnormalities and wall   thickness was within normal limits. E/A flow reversal noted. Suggestive of diastolic dysfunction. Signature      ----------------------------------------------------------------   Electronically signed by Lindsey Ibarra MD (Interpreting   physician) on 11/07/2018 at 04:18 PM   ----------------------------------------------------------------      Findings      Mitral Valve   The mitral valve structure was normal with normal leaflet separation. DOPPLER: The transmitral velocity was within the normal range with no   evidence for mitral stenosis. There was no evidence of mitral   regurgitation. Aortic Valve   The aortic valve was trileaflet with normal thickness and cuspal   separation. DOPPLER: Transaortic velocity was within the normal range with   no evidence of aortic stenosis. There was no evidence of aortic   regurgitation. Tricuspid Valve   The tricuspid valve structure was normal with normal leaflet separation.    DOPPLER: There was no evidence of tricuspid

## 2018-12-07 ENCOUNTER — HOSPITAL ENCOUNTER (OUTPATIENT)
Dept: INTERVENTIONAL RADIOLOGY/VASCULAR | Age: 68
Discharge: HOME | End: 2018-12-07
Payer: OTHER GOVERNMENT

## 2018-12-07 DIAGNOSIS — I70.0: ICD-10-CM

## 2018-12-07 PROCEDURE — 93922 UPR/L XTREMITY ART 2 LEVELS: CPT

## 2018-12-11 ENCOUNTER — HOSPITAL ENCOUNTER (OUTPATIENT)
Dept: CT IMAGING | Age: 68
Discharge: HOME | End: 2018-12-11
Payer: OTHER GOVERNMENT

## 2018-12-11 DIAGNOSIS — I70.0: ICD-10-CM

## 2018-12-11 LAB — POC CREATININE WHOLE BLOOD: 0.9 MG/DL (ref 0.5–1.2)

## 2018-12-11 PROCEDURE — 82565 ASSAY OF CREATININE: CPT

## 2018-12-11 PROCEDURE — 75635 CT ANGIO ABDOMINAL ARTERIES: CPT

## 2018-12-11 PROCEDURE — 6360000004 HC RX CONTRAST MEDICATION: Performed by: THORACIC SURGERY (CARDIOTHORACIC VASCULAR SURGERY)

## 2018-12-11 RX ADMIN — IOPAMIDOL 110 ML: 755 INJECTION, SOLUTION INTRAVENOUS at 10:09

## 2018-12-12 ENCOUNTER — HOSPITAL ENCOUNTER (OUTPATIENT)
Dept: CT IMAGING | Age: 68
Discharge: HOME | End: 2018-12-12
Payer: OTHER GOVERNMENT

## 2018-12-12 DIAGNOSIS — I65.22 CAROTID ARTERY STENOSIS WITHOUT CEREBRAL INFARCTION, LEFT: ICD-10-CM

## 2018-12-12 LAB — POC CREATININE WHOLE BLOOD: 1 MG/DL (ref 0.5–1.2)

## 2018-12-12 PROCEDURE — 6360000004 HC RX CONTRAST MEDICATION: Performed by: THORACIC SURGERY (CARDIOTHORACIC VASCULAR SURGERY)

## 2018-12-12 PROCEDURE — 70498 CT ANGIOGRAPHY NECK: CPT

## 2018-12-12 PROCEDURE — 82565 ASSAY OF CREATININE: CPT

## 2018-12-12 RX ADMIN — IOPAMIDOL 80 ML: 755 INJECTION, SOLUTION INTRAVENOUS at 19:57

## 2018-12-17 ENCOUNTER — OFFICE VISIT (OUTPATIENT)
Dept: CARDIOTHORACIC SURGERY | Age: 68
End: 2018-12-17
Payer: OTHER GOVERNMENT

## 2018-12-17 VITALS
SYSTOLIC BLOOD PRESSURE: 160 MMHG | HEIGHT: 70 IN | DIASTOLIC BLOOD PRESSURE: 75 MMHG | WEIGHT: 216.6 LBS | BODY MASS INDEX: 31.01 KG/M2

## 2018-12-17 DIAGNOSIS — I70.0 AORTOILIAC STENOSIS, LEFT (HCC): ICD-10-CM

## 2018-12-17 DIAGNOSIS — I65.22 CAROTID STENOSIS, LEFT: Primary | ICD-10-CM

## 2018-12-17 PROCEDURE — 99213 OFFICE O/P EST LOW 20 MIN: CPT | Performed by: THORACIC SURGERY (CARDIOTHORACIC VASCULAR SURGERY)

## 2018-12-18 ENCOUNTER — PREP FOR PROCEDURE (OUTPATIENT)
Dept: CARDIOTHORACIC SURGERY | Age: 68
End: 2018-12-18

## 2018-12-18 RX ORDER — SODIUM CHLORIDE 0.9 % (FLUSH) 0.9 %
10 SYRINGE (ML) INJECTION PRN
Status: CANCELLED | OUTPATIENT
Start: 2018-12-18

## 2018-12-18 RX ORDER — SODIUM CHLORIDE 0.9 % (FLUSH) 0.9 %
10 SYRINGE (ML) INJECTION EVERY 12 HOURS SCHEDULED
Status: CANCELLED | OUTPATIENT
Start: 2018-12-18

## 2018-12-19 ENCOUNTER — APPOINTMENT (OUTPATIENT)
Dept: CT IMAGING | Age: 68
End: 2018-12-19
Payer: OTHER GOVERNMENT

## 2018-12-19 ENCOUNTER — HOSPITAL ENCOUNTER (OUTPATIENT)
Age: 68
Setting detail: OBSERVATION
Discharge: HOME | End: 2018-12-20
Attending: FAMILY MEDICINE | Admitting: INTERNAL MEDICINE
Payer: OTHER GOVERNMENT

## 2018-12-19 DIAGNOSIS — R07.9 CHEST PAIN, UNSPECIFIED TYPE: Primary | ICD-10-CM

## 2018-12-19 PROBLEM — I50.32 CHRONIC DIASTOLIC HEART FAILURE (HCC): Status: ACTIVE | Noted: 2018-12-19

## 2018-12-19 PROBLEM — I16.0 HYPERTENSIVE URGENCY: Status: ACTIVE | Noted: 2018-12-19

## 2018-12-19 LAB
ANION GAP SERPL CALCULATED.3IONS-SCNC: 15 MEQ/L (ref 8–16)
APTT: 32 SECONDS (ref 22–38)
BASOPHILS # BLD: 0.4 %
BASOPHILS ABSOLUTE: 0 THOU/MM3 (ref 0–0.1)
BUN BLDV-MCNC: 19 MG/DL (ref 7–22)
CALCIUM SERPL-MCNC: 10 MG/DL (ref 8.5–10.5)
CHLORIDE BLD-SCNC: 99 MEQ/L (ref 98–111)
CO2: 26 MEQ/L (ref 23–33)
CREAT SERPL-MCNC: 0.9 MG/DL (ref 0.4–1.2)
EKG ATRIAL RATE: 71 BPM
EKG P AXIS: 60 DEGREES
EKG P-R INTERVAL: 168 MS
EKG Q-T INTERVAL: 374 MS
EKG QRS DURATION: 106 MS
EKG QTC CALCULATION (BAZETT): 406 MS
EKG R AXIS: -29 DEGREES
EKG T AXIS: 83 DEGREES
EKG VENTRICULAR RATE: 71 BPM
EOSINOPHIL # BLD: 0.9 %
EOSINOPHILS ABSOLUTE: 0.1 THOU/MM3 (ref 0–0.4)
ERYTHROCYTE [DISTWIDTH] IN BLOOD BY AUTOMATED COUNT: 12 % (ref 11.5–14.5)
ERYTHROCYTE [DISTWIDTH] IN BLOOD BY AUTOMATED COUNT: 38.7 FL (ref 35–45)
GFR SERPL CREATININE-BSD FRML MDRD: > 90 ML/MIN/1.73M2
GLUCOSE BLD-MCNC: 178 MG/DL (ref 70–108)
HCT VFR BLD CALC: 38.8 % (ref 42–52)
HEMOGLOBIN: 12.4 GM/DL (ref 14–18)
IMMATURE GRANS (ABS): 0.03 THOU/MM3 (ref 0–0.07)
IMMATURE GRANULOCYTES: 0.3 %
INR BLD: 0.95 (ref 0.85–1.13)
LYMPHOCYTES # BLD: 26.8 %
LYMPHOCYTES ABSOLUTE: 2.4 THOU/MM3 (ref 1–4.8)
MCH RBC QN AUTO: 28.4 PG (ref 26–33)
MCHC RBC AUTO-ENTMCNC: 32 GM/DL (ref 32.2–35.5)
MCV RBC AUTO: 89 FL (ref 80–94)
MONOCYTES # BLD: 6.2 %
MONOCYTES ABSOLUTE: 0.6 THOU/MM3 (ref 0.4–1.3)
NUCLEATED RED BLOOD CELLS: 0 /100 WBC
OSMOLALITY CALCULATION: 286.1 MOSMOL/KG (ref 275–300)
PLATELET # BLD: 263 THOU/MM3 (ref 130–400)
PMV BLD AUTO: 12.5 FL (ref 9.4–12.4)
POTASSIUM SERPL-SCNC: 3.7 MEQ/L (ref 3.5–5.2)
PRO-BNP: 114.2 PG/ML (ref 0–900)
RBC # BLD: 4.36 MILL/MM3 (ref 4.7–6.1)
SEG NEUTROPHILS: 65.4 %
SEGMENTED NEUTROPHILS ABSOLUTE COUNT: 5.9 THOU/MM3 (ref 1.8–7.7)
SODIUM BLD-SCNC: 140 MEQ/L (ref 135–145)
TROPONIN T: < 0.01 NG/ML
WBC # BLD: 9 THOU/MM3 (ref 4.8–10.8)

## 2018-12-19 PROCEDURE — G0378 HOSPITAL OBSERVATION PER HR: HCPCS

## 2018-12-19 PROCEDURE — 85610 PROTHROMBIN TIME: CPT

## 2018-12-19 PROCEDURE — 6360000004 HC RX CONTRAST MEDICATION: Performed by: FAMILY MEDICINE

## 2018-12-19 PROCEDURE — 85730 THROMBOPLASTIN TIME PARTIAL: CPT

## 2018-12-19 PROCEDURE — 71275 CT ANGIOGRAPHY CHEST: CPT

## 2018-12-19 PROCEDURE — 93010 ELECTROCARDIOGRAM REPORT: CPT | Performed by: INTERNAL MEDICINE

## 2018-12-19 PROCEDURE — 84484 ASSAY OF TROPONIN QUANT: CPT

## 2018-12-19 PROCEDURE — 85025 COMPLETE CBC W/AUTO DIFF WBC: CPT

## 2018-12-19 PROCEDURE — 36415 COLL VENOUS BLD VENIPUNCTURE: CPT

## 2018-12-19 PROCEDURE — 83880 ASSAY OF NATRIURETIC PEPTIDE: CPT

## 2018-12-19 PROCEDURE — 99220 PR INITIAL OBSERVATION CARE/DAY 70 MINUTES: CPT | Performed by: INTERNAL MEDICINE

## 2018-12-19 PROCEDURE — 80048 BASIC METABOLIC PNL TOTAL CA: CPT

## 2018-12-19 PROCEDURE — 93005 ELECTROCARDIOGRAM TRACING: CPT | Performed by: FAMILY MEDICINE

## 2018-12-19 PROCEDURE — 99285 EMERGENCY DEPT VISIT HI MDM: CPT

## 2018-12-19 RX ORDER — ALBUTEROL SULFATE 90 UG/1
2 AEROSOL, METERED RESPIRATORY (INHALATION) EVERY 4 HOURS PRN
Status: DISCONTINUED | OUTPATIENT
Start: 2018-12-19 | End: 2018-12-20 | Stop reason: HOSPADM

## 2018-12-19 RX ORDER — HYDROCHLOROTHIAZIDE 25 MG/1
25 TABLET ORAL DAILY
Status: DISCONTINUED | OUTPATIENT
Start: 2018-12-20 | End: 2018-12-20 | Stop reason: HOSPADM

## 2018-12-19 RX ORDER — DEXTROSE MONOHYDRATE 50 MG/ML
100 INJECTION, SOLUTION INTRAVENOUS PRN
Status: DISCONTINUED | OUTPATIENT
Start: 2018-12-19 | End: 2018-12-20 | Stop reason: HOSPADM

## 2018-12-19 RX ORDER — NICOTINE POLACRILEX 4 MG
15 LOZENGE BUCCAL PRN
Status: DISCONTINUED | OUTPATIENT
Start: 2018-12-19 | End: 2018-12-20 | Stop reason: HOSPADM

## 2018-12-19 RX ORDER — SODIUM CHLORIDE 9 MG/ML
INJECTION, SOLUTION INTRAVENOUS CONTINUOUS
Status: ACTIVE | OUTPATIENT
Start: 2018-12-19 | End: 2018-12-20

## 2018-12-19 RX ORDER — CILOSTAZOL 100 MG/1
100 TABLET ORAL 2 TIMES DAILY
Status: DISCONTINUED | OUTPATIENT
Start: 2018-12-19 | End: 2018-12-20 | Stop reason: HOSPADM

## 2018-12-19 RX ORDER — ASPIRIN 81 MG/1
243 TABLET, CHEWABLE ORAL ONCE
Status: COMPLETED | OUTPATIENT
Start: 2018-12-19 | End: 2018-12-20

## 2018-12-19 RX ORDER — NITROGLYCERIN 0.4 MG/1
0.4 TABLET SUBLINGUAL EVERY 5 MIN PRN
Status: DISCONTINUED | OUTPATIENT
Start: 2018-12-19 | End: 2018-12-20 | Stop reason: HOSPADM

## 2018-12-19 RX ORDER — ISOSORBIDE MONONITRATE 60 MG/1
60 TABLET, EXTENDED RELEASE ORAL ONCE
Status: COMPLETED | OUTPATIENT
Start: 2018-12-19 | End: 2018-12-20

## 2018-12-19 RX ORDER — ASPIRIN 81 MG/1
81 TABLET, CHEWABLE ORAL DAILY
Status: DISCONTINUED | OUTPATIENT
Start: 2018-12-20 | End: 2018-12-20 | Stop reason: HOSPADM

## 2018-12-19 RX ORDER — ATORVASTATIN CALCIUM 80 MG/1
80 TABLET, FILM COATED ORAL NIGHTLY
Status: DISCONTINUED | OUTPATIENT
Start: 2018-12-20 | End: 2018-12-20 | Stop reason: HOSPADM

## 2018-12-19 RX ORDER — HYDRALAZINE HYDROCHLORIDE 20 MG/ML
10 INJECTION INTRAMUSCULAR; INTRAVENOUS EVERY 6 HOURS PRN
Status: DISCONTINUED | OUTPATIENT
Start: 2018-12-19 | End: 2018-12-20 | Stop reason: HOSPADM

## 2018-12-19 RX ORDER — PANTOPRAZOLE SODIUM 40 MG/1
40 TABLET, DELAYED RELEASE ORAL
Status: DISCONTINUED | OUTPATIENT
Start: 2018-12-20 | End: 2018-12-20 | Stop reason: HOSPADM

## 2018-12-19 RX ORDER — OXYCODONE HYDROCHLORIDE AND ACETAMINOPHEN 5; 325 MG/1; MG/1
1 TABLET ORAL EVERY 4 HOURS PRN
Status: DISCONTINUED | OUTPATIENT
Start: 2018-12-19 | End: 2018-12-20 | Stop reason: HOSPADM

## 2018-12-19 RX ORDER — ISOSORBIDE MONONITRATE 60 MG/1
120 TABLET, EXTENDED RELEASE ORAL DAILY
Status: DISCONTINUED | OUTPATIENT
Start: 2018-12-20 | End: 2018-12-20 | Stop reason: HOSPADM

## 2018-12-19 RX ORDER — ONDANSETRON 2 MG/ML
4 INJECTION INTRAMUSCULAR; INTRAVENOUS EVERY 6 HOURS PRN
Status: DISCONTINUED | OUTPATIENT
Start: 2018-12-19 | End: 2018-12-20 | Stop reason: HOSPADM

## 2018-12-19 RX ORDER — DEXTROSE MONOHYDRATE 25 G/50ML
12.5 INJECTION, SOLUTION INTRAVENOUS PRN
Status: DISCONTINUED | OUTPATIENT
Start: 2018-12-19 | End: 2018-12-20 | Stop reason: HOSPADM

## 2018-12-19 RX ORDER — SODIUM CHLORIDE 0.9 % (FLUSH) 0.9 %
10 SYRINGE (ML) INJECTION EVERY 12 HOURS SCHEDULED
Status: DISCONTINUED | OUTPATIENT
Start: 2018-12-19 | End: 2018-12-20 | Stop reason: HOSPADM

## 2018-12-19 RX ORDER — SODIUM CHLORIDE 0.9 % (FLUSH) 0.9 %
10 SYRINGE (ML) INJECTION PRN
Status: DISCONTINUED | OUTPATIENT
Start: 2018-12-19 | End: 2018-12-20 | Stop reason: HOSPADM

## 2018-12-19 RX ORDER — SPIRONOLACTONE 25 MG/1
25 TABLET ORAL DAILY
Status: DISCONTINUED | OUTPATIENT
Start: 2018-12-20 | End: 2018-12-20 | Stop reason: HOSPADM

## 2018-12-19 RX ORDER — AMLODIPINE BESYLATE 10 MG/1
10 TABLET ORAL DAILY
Status: DISCONTINUED | OUTPATIENT
Start: 2018-12-20 | End: 2018-12-20 | Stop reason: HOSPADM

## 2018-12-19 RX ADMIN — IOPAMIDOL 80 ML: 755 INJECTION, SOLUTION INTRAVENOUS at 20:07

## 2018-12-19 ASSESSMENT — ENCOUNTER SYMPTOMS
SHORTNESS OF BREATH: 0
DIARRHEA: 0
PHOTOPHOBIA: 0
CONSTIPATION: 0
STRIDOR: 0
SORE THROAT: 0
ABDOMINAL PAIN: 0
ABDOMINAL DISTENTION: 0
CHOKING: 0
BLOOD IN STOOL: 0
BACK PAIN: 0
CHEST TIGHTNESS: 0
RHINORRHEA: 0
NAUSEA: 0
COUGH: 0
VOMITING: 0

## 2018-12-19 ASSESSMENT — PAIN SCALES - GENERAL
PAINLEVEL_OUTOF10: 8
PAINLEVEL_OUTOF10: 0

## 2018-12-19 ASSESSMENT — PAIN DESCRIPTION - PAIN TYPE: TYPE: ACUTE PAIN

## 2018-12-19 ASSESSMENT — PAIN DESCRIPTION - LOCATION: LOCATION: CHEST

## 2018-12-19 ASSESSMENT — HEART SCORE: ECG: 1

## 2018-12-19 NOTE — ED PROVIDER NOTES
distension and no mass. There is no tenderness. There is no rebound and no guarding. Musculoskeletal: Normal range of motion. He exhibits no edema, tenderness or deformity. Neurological: He is alert and oriented to person, place, and time. No sensory deficit. He exhibits normal muscle tone. Skin: Skin is warm and dry. Capillary refill takes less than 2 seconds. No rash noted. He is not diaphoretic. No erythema. No pallor. Psychiatric: He has a normal mood and affect. Judgment normal.       GLASCOW COMA SCALE  NEUROMUSCULAR BLOCKADE PRIOR TO ARRIVAL [x]No []Yes    Variable  Score   Variable  Score  Eye opening [x]Spontaneous 4 Verbal  [x]Oriented  5     []To voice  3   []Confused  4    []To pain  2   []Inapp words  3    []None  1   []Incomp words 2       []None  1   Motor   [x]Obeys  6    []Localizes pain 5    []Withdraws(pain) 4    []Flexion(pain) 3  []Extension(pain) 2    []None  1     GCS Total = 15        DIFFERENTIAL DIAGNOSIS:   Diagnoses discussed with the patient and includedbut not limited to aortic dissection, aortic aneurysm, ACS, MI, PE    DIAGNOSTIC RESULTS     EKG: AllEKG's are interpreted by the Emergency Department Physician who either signs or Co-signs this chart in the absence of a cardiologist.  Normal sinus rhythm. Ventricular rate 71. . QRS 6. . Left axis deviation. Incomplete right bundle branch block. No ST changes. When compared to previous, no significant changes noted. RADIOLOGY: non-plain film images(s) such as CT, Ultrasound and MRI are read by the radiologist.    CTA CHEST W WO CONTRAST   Final Result      No acute pulmonary embolism. No acute pneumonia   Stable 3.7 x 3.8 cm descending thoracic aortic aneurysm with mild to moderate mural thrombus most pronounced at the level of the diaphragmatic aortic hiatus. Stable possible small plaque ulcer involving the lateral wall of the distal descending thoracic    aorta.              **This report has been

## 2018-12-20 VITALS
HEART RATE: 62 BPM | RESPIRATION RATE: 17 BRPM | SYSTOLIC BLOOD PRESSURE: 160 MMHG | TEMPERATURE: 97.5 F | WEIGHT: 213.8 LBS | BODY MASS INDEX: 30.61 KG/M2 | OXYGEN SATURATION: 96 % | HEIGHT: 70 IN | DIASTOLIC BLOOD PRESSURE: 81 MMHG

## 2018-12-20 LAB
ANION GAP SERPL CALCULATED.3IONS-SCNC: 10 MEQ/L (ref 8–16)
AVERAGE GLUCOSE: 165 MG/DL (ref 70–126)
BUN BLDV-MCNC: 18 MG/DL (ref 7–22)
CALCIUM SERPL-MCNC: 9.5 MG/DL (ref 8.5–10.5)
CHLORIDE BLD-SCNC: 102 MEQ/L (ref 98–111)
CO2: 28 MEQ/L (ref 23–33)
CREAT SERPL-MCNC: 0.8 MG/DL (ref 0.4–1.2)
GFR SERPL CREATININE-BSD FRML MDRD: > 90 ML/MIN/1.73M2
GLUCOSE BLD-MCNC: 174 MG/DL (ref 70–108)
GLUCOSE BLD-MCNC: 175 MG/DL (ref 70–108)
GLUCOSE BLD-MCNC: 178 MG/DL (ref 70–108)
HBA1C MFR BLD: 7.5 % (ref 4.4–6.4)
POTASSIUM SERPL-SCNC: 4.3 MEQ/L (ref 3.5–5.2)
SODIUM BLD-SCNC: 140 MEQ/L (ref 135–145)
TROPONIN T: < 0.01 NG/ML
TROPONIN T: < 0.01 NG/ML

## 2018-12-20 PROCEDURE — 83036 HEMOGLOBIN GLYCOSYLATED A1C: CPT

## 2018-12-20 PROCEDURE — 6370000000 HC RX 637 (ALT 250 FOR IP): Performed by: INTERNAL MEDICINE

## 2018-12-20 PROCEDURE — 94640 AIRWAY INHALATION TREATMENT: CPT

## 2018-12-20 PROCEDURE — 84484 ASSAY OF TROPONIN QUANT: CPT

## 2018-12-20 PROCEDURE — 99217 PR OBSERVATION CARE DISCHARGE MANAGEMENT: CPT | Performed by: INTERNAL MEDICINE

## 2018-12-20 PROCEDURE — 36415 COLL VENOUS BLD VENIPUNCTURE: CPT

## 2018-12-20 PROCEDURE — 96372 THER/PROPH/DIAG INJ SC/IM: CPT

## 2018-12-20 PROCEDURE — 94760 N-INVAS EAR/PLS OXIMETRY 1: CPT

## 2018-12-20 PROCEDURE — 2580000003 HC RX 258: Performed by: INTERNAL MEDICINE

## 2018-12-20 PROCEDURE — G0378 HOSPITAL OBSERVATION PER HR: HCPCS

## 2018-12-20 PROCEDURE — 82948 REAGENT STRIP/BLOOD GLUCOSE: CPT

## 2018-12-20 PROCEDURE — 80048 BASIC METABOLIC PNL TOTAL CA: CPT

## 2018-12-20 PROCEDURE — 2709999900 HC NON-CHARGEABLE SUPPLY

## 2018-12-20 PROCEDURE — 6360000002 HC RX W HCPCS: Performed by: INTERNAL MEDICINE

## 2018-12-20 RX ORDER — ISOSORBIDE MONONITRATE 120 MG/1
120 TABLET, EXTENDED RELEASE ORAL DAILY
Qty: 30 TABLET | Refills: 0 | Status: SHIPPED | OUTPATIENT
Start: 2018-12-21 | End: 2022-07-14 | Stop reason: ALTCHOICE

## 2018-12-20 RX ADMIN — METOPROLOL TARTRATE 25 MG: 25 TABLET, FILM COATED ORAL at 03:46

## 2018-12-20 RX ADMIN — ISOSORBIDE MONONITRATE 60 MG: 60 TABLET ORAL at 03:45

## 2018-12-20 RX ADMIN — ASPIRIN 81 MG 243 MG: 81 TABLET ORAL at 03:46

## 2018-12-20 RX ADMIN — HYDROCHLOROTHIAZIDE 25 MG: 25 TABLET ORAL at 09:39

## 2018-12-20 RX ADMIN — ISOSORBIDE MONONITRATE 120 MG: 60 TABLET ORAL at 09:39

## 2018-12-20 RX ADMIN — ENOXAPARIN SODIUM 40 MG: 40 INJECTION SUBCUTANEOUS at 03:46

## 2018-12-20 RX ADMIN — Medication 1 UNITS: at 09:40

## 2018-12-20 RX ADMIN — SODIUM CHLORIDE: 9 INJECTION, SOLUTION INTRAVENOUS at 03:35

## 2018-12-20 RX ADMIN — ATORVASTATIN CALCIUM 80 MG: 80 TABLET, FILM COATED ORAL at 03:46

## 2018-12-20 RX ADMIN — SPIRONOLACTONE 25 MG: 25 TABLET ORAL at 09:39

## 2018-12-20 RX ADMIN — AMLODIPINE BESYLATE 10 MG: 10 TABLET ORAL at 09:39

## 2018-12-20 RX ADMIN — ASPIRIN 81 MG 81 MG: 81 TABLET ORAL at 09:39

## 2018-12-20 RX ADMIN — GLYCOPYRROLATE AND FORMOTEROL FUMARATE 2 PUFF: 9; 4.8 AEROSOL, METERED RESPIRATORY (INHALATION) at 10:36

## 2018-12-20 RX ADMIN — Medication 10 ML: at 03:46

## 2018-12-20 RX ADMIN — CILOSTAZOL 100 MG: 100 TABLET ORAL at 09:39

## 2018-12-20 RX ADMIN — PANTOPRAZOLE SODIUM 40 MG: 40 TABLET, DELAYED RELEASE ORAL at 05:54

## 2018-12-20 ASSESSMENT — PAIN SCALES - GENERAL: PAINLEVEL_OUTOF10: 0

## 2018-12-20 NOTE — PROGRESS NOTES
Discharge teaching and instructions for diagnosis/procedure of Chest pain, HTN completed with patient using teachback method. AVS reviewed. Printed prescriptions given to patient. Patient voiced understanding regarding prescriptions, follow up appointments, and care of self at home. Discharged ambulatory to  home with support per family.

## 2018-12-20 NOTE — H&P
Gen/overall appearance: Not in acute distress. Alert. Oriented X3. Head: Normocephalic, atraumatic  Eyes: EOMI, good acuity  ENT: Oral mucosa moist  Neck: No JVD, thyromegaly  CVS: Nml S1S2, no MRG, RRR  Pulm: Clear bilaterally. No crackles/wheezes  Gastrointestinal: Soft, NT/ND, +BS  Musculoskeletal: No edema. Warm  Neuro: No focal deficit. Moves extremity spontaneously. Psychiatry: Appropriate affect. Not agitated. Skin: Warm, dry with normal turgor. No rash  Capillary refill: Brisk,< 3 seconds   Peripheral Pulses: +2 palpable, equal bilaterally      Labs/imaging/EKG:  CBC:   Recent Labs      12/19/18   1815   WBC  9.0   HGB  12.4*   PLT  263     BMP:    Recent Labs      12/19/18   1815   NA  140   K  3.7   CL  99   CO2  26   BUN  19   CREATININE  0.9   GLUCOSE  178*     Hepatic: No results for input(s): AST, ALT, ALB, BILITOT, ALKPHOS in the last 72 hours. Cta Neck W Wo Contrast    Result Date: 12/13/2018  PROCEDURE: CTA NECK W WO CONTRAST CLINICAL INFORMATION: Carotid artery stenosis without cerebral infarction, left. Surgery pending. COMPARISON: Carotid ultrasound 11/8/2018. TECHNIQUE: 1 mm axial images were obtained through the neck after the fast bolus administration of contrast. A noncontrast localizer was obtained. 3-D reconstructions were performed on a dedicated 3-D workstation. These include multiplanar MPR images, multiplanar MIP images and centerline reconstructions. Isovue intravenous contrast was given. All CT scans at this facility use dose modulation, iterative reconstruction, and/or weight-based dosing when appropriate to reduce radiation dose to as low as reasonably achievable. FINDINGS: Aortic arch and branches: There is mild atherosclerosis of the aortic arch. There is some atherosclerosis at the origins of innominate artery, left common carotid artery and left subclavian artery. There is no stenosis of the innominate artery.  There is mild stenosis of the left common carotid artery origin. There is mild stenosis at the origin the left subclavian artery. There is a severe stenosis a few centimeters beyond the origin. This severe stenosis is prior to the origin of the left vertebral artery. There is no stenosis of the right subclavian artery. Right common carotid artery/ICA: The right common carotid artery origin is normal. There is eccentric soft plaque in the distal right common carotid artery. There is mild stenosis. There is calcified and noncalcified atherosclerosis of the right carotid bulb. Using NASCET criteria and the distal right internal carotid artery as the reference, there is a 45% stenosis at the origin of the right internal carotid artery. Distally, the right internal carotid artery is tortuous and redundant. There is no distal stenosis. Left common carotid artery/ICA: There is some smooth eccentric atherosclerosis of the left common carotid artery. There is no significant stenosis. There is complex atherosclerosis of the left carotid bulb. There are ulcerations. There is a significant stenosis. Using NASCET criteria and the distal left internal carotid artery as the reference, there is a 70% stenosis at the origin of the left internal carotid artery. There is a moderate to severe stenosis at the origin of the left external carotid artery. There is a 40% stenosis in the distal left common carotid artery. The distal left internal carotid artery is tortuous and redundant. There is no distal stenosis. Vertebral arteries: There is a severe stenosis at the origin of the left vertebral artery. There is a moderate stenosis at the origin of the right vertebral artery. There is no distal stenoses. Intracranial cerebral circulation: No intracranial stenoses are noted. There is atherosclerosis of the cavernous segments of the internal carotid arteries. Axial source data: There are no suspicious findings in the lung apices. There are old healed left-sided rib fractures.  There is no cervical

## 2018-12-24 ENCOUNTER — TELEPHONE (OUTPATIENT)
Dept: CARDIOTHORACIC SURGERY | Age: 68
End: 2018-12-24

## 2018-12-24 ENCOUNTER — PREP FOR PROCEDURE (OUTPATIENT)
Dept: CARDIOTHORACIC SURGERY | Age: 68
End: 2018-12-24

## 2018-12-24 RX ORDER — SODIUM CHLORIDE 0.9 % (FLUSH) 0.9 %
10 SYRINGE (ML) INJECTION EVERY 12 HOURS SCHEDULED
Status: CANCELLED | OUTPATIENT
Start: 2018-12-24

## 2018-12-24 RX ORDER — SODIUM CHLORIDE 0.9 % (FLUSH) 0.9 %
10 SYRINGE (ML) INJECTION PRN
Status: CANCELLED | OUTPATIENT
Start: 2018-12-24

## 2018-12-26 ENCOUNTER — ANESTHESIA (OUTPATIENT)
Dept: OPERATING ROOM | Age: 68
DRG: 038 | End: 2018-12-26
Payer: OTHER GOVERNMENT

## 2018-12-26 ENCOUNTER — ANESTHESIA EVENT (OUTPATIENT)
Dept: OPERATING ROOM | Age: 68
DRG: 038 | End: 2018-12-26
Payer: OTHER GOVERNMENT

## 2018-12-26 ENCOUNTER — HOSPITAL ENCOUNTER (INPATIENT)
Age: 68
LOS: 1 days | Discharge: HOME | DRG: 038 | End: 2018-12-27
Attending: THORACIC SURGERY (CARDIOTHORACIC VASCULAR SURGERY) | Admitting: THORACIC SURGERY (CARDIOTHORACIC VASCULAR SURGERY)
Payer: OTHER GOVERNMENT

## 2018-12-26 VITALS
RESPIRATION RATE: 10 BRPM | OXYGEN SATURATION: 98 % | DIASTOLIC BLOOD PRESSURE: 71 MMHG | TEMPERATURE: 99 F | SYSTOLIC BLOOD PRESSURE: 157 MMHG

## 2018-12-26 DIAGNOSIS — I65.22 LEFT CAROTID STENOSIS: Primary | ICD-10-CM

## 2018-12-26 LAB
ABO: NORMAL
ANION GAP SERPL CALCULATED.3IONS-SCNC: 9 MEQ/L (ref 8–16)
ANTIBODY SCREEN: NORMAL
APTT: 32.4 SECONDS (ref 22–38)
BUN BLDV-MCNC: 14 MG/DL (ref 7–22)
CALCIUM SERPL-MCNC: 9.5 MG/DL (ref 8.5–10.5)
CHLORIDE BLD-SCNC: 99 MEQ/L (ref 98–111)
CO2: 30 MEQ/L (ref 23–33)
CREAT SERPL-MCNC: 0.7 MG/DL (ref 0.4–1.2)
EKG ATRIAL RATE: 54 BPM
EKG P AXIS: 50 DEGREES
EKG P-R INTERVAL: 174 MS
EKG Q-T INTERVAL: 404 MS
EKG QRS DURATION: 106 MS
EKG QTC CALCULATION (BAZETT): 383 MS
EKG R AXIS: -26 DEGREES
EKG T AXIS: -50 DEGREES
EKG VENTRICULAR RATE: 54 BPM
ERYTHROCYTE [DISTWIDTH] IN BLOOD BY AUTOMATED COUNT: 11.9 % (ref 11.5–14.5)
ERYTHROCYTE [DISTWIDTH] IN BLOOD BY AUTOMATED COUNT: 38.7 FL (ref 35–45)
GFR SERPL CREATININE-BSD FRML MDRD: > 90 ML/MIN/1.73M2
GLUCOSE BLD-MCNC: 167 MG/DL (ref 70–108)
GLUCOSE BLD-MCNC: 179 MG/DL (ref 70–108)
GLUCOSE BLD-MCNC: 217 MG/DL (ref 70–108)
HCT VFR BLD CALC: 40.3 % (ref 42–52)
HEMOGLOBIN: 12.7 GM/DL (ref 14–18)
INR BLD: 0.9 (ref 0.85–1.13)
MCH RBC QN AUTO: 28.5 PG (ref 26–33)
MCHC RBC AUTO-ENTMCNC: 31.5 GM/DL (ref 32.2–35.5)
MCV RBC AUTO: 90.4 FL (ref 80–94)
MRSA SCREEN RT-PCR: NEGATIVE
PLATELET # BLD: 244 THOU/MM3 (ref 130–400)
PMV BLD AUTO: 12.4 FL (ref 9.4–12.4)
POTASSIUM REFLEX MAGNESIUM: 4.6 MEQ/L (ref 3.5–5.2)
RBC # BLD: 4.46 MILL/MM3 (ref 4.7–6.1)
RH FACTOR: NORMAL
SODIUM BLD-SCNC: 138 MEQ/L (ref 135–145)
VANCOMYCIN RESISTANT ENTEROCOCCUS: NEGATIVE
WBC # BLD: 9.4 THOU/MM3 (ref 4.8–10.8)

## 2018-12-26 PROCEDURE — 2580000003 HC RX 258: Performed by: THORACIC SURGERY (CARDIOTHORACIC VASCULAR SURGERY)

## 2018-12-26 PROCEDURE — 86900 BLOOD TYPING SEROLOGIC ABO: CPT

## 2018-12-26 PROCEDURE — 2500000003 HC RX 250 WO HCPCS: Performed by: ANESTHESIOLOGY

## 2018-12-26 PROCEDURE — 85027 COMPLETE CBC AUTOMATED: CPT

## 2018-12-26 PROCEDURE — 3700000001 HC ADD 15 MINUTES (ANESTHESIA): Performed by: THORACIC SURGERY (CARDIOTHORACIC VASCULAR SURGERY)

## 2018-12-26 PROCEDURE — 2709999900 HC NON-CHARGEABLE SUPPLY: Performed by: THORACIC SURGERY (CARDIOTHORACIC VASCULAR SURGERY)

## 2018-12-26 PROCEDURE — 2000000000 HC ICU R&B

## 2018-12-26 PROCEDURE — 2500000003 HC RX 250 WO HCPCS: Performed by: THORACIC SURGERY (CARDIOTHORACIC VASCULAR SURGERY)

## 2018-12-26 PROCEDURE — 88304 TISSUE EXAM BY PATHOLOGIST: CPT

## 2018-12-26 PROCEDURE — 85730 THROMBOPLASTIN TIME PARTIAL: CPT

## 2018-12-26 PROCEDURE — 86901 BLOOD TYPING SEROLOGIC RH(D): CPT

## 2018-12-26 PROCEDURE — C1768 GRAFT, VASCULAR: HCPCS | Performed by: THORACIC SURGERY (CARDIOTHORACIC VASCULAR SURGERY)

## 2018-12-26 PROCEDURE — 36415 COLL VENOUS BLD VENIPUNCTURE: CPT

## 2018-12-26 PROCEDURE — 93005 ELECTROCARDIOGRAM TRACING: CPT | Performed by: THORACIC SURGERY (CARDIOTHORACIC VASCULAR SURGERY)

## 2018-12-26 PROCEDURE — 35301 RECHANNELING OF ARTERY: CPT | Performed by: THORACIC SURGERY (CARDIOTHORACIC VASCULAR SURGERY)

## 2018-12-26 PROCEDURE — 03CN0ZZ EXTIRPATION OF MATTER FROM LEFT EXTERNAL CAROTID ARTERY, OPEN APPROACH: ICD-10-PCS | Performed by: THORACIC SURGERY (CARDIOTHORACIC VASCULAR SURGERY)

## 2018-12-26 PROCEDURE — 87081 CULTURE SCREEN ONLY: CPT

## 2018-12-26 PROCEDURE — 82948 REAGENT STRIP/BLOOD GLUCOSE: CPT

## 2018-12-26 PROCEDURE — 88305 TISSUE EXAM BY PATHOLOGIST: CPT

## 2018-12-26 PROCEDURE — 6360000002 HC RX W HCPCS: Performed by: THORACIC SURGERY (CARDIOTHORACIC VASCULAR SURGERY)

## 2018-12-26 PROCEDURE — 87641 MR-STAPH DNA AMP PROBE: CPT

## 2018-12-26 PROCEDURE — 35301 RECHANNELING OF ARTERY: CPT | Performed by: PHYSICIAN ASSISTANT

## 2018-12-26 PROCEDURE — 87500 VANOMYCIN DNA AMP PROBE: CPT

## 2018-12-26 PROCEDURE — 36620 INSERTION CATHETER ARTERY: CPT | Performed by: ANESTHESIOLOGY

## 2018-12-26 PROCEDURE — 80048 BASIC METABOLIC PNL TOTAL CA: CPT

## 2018-12-26 PROCEDURE — 2720000010 HC SURG SUPPLY STERILE: Performed by: THORACIC SURGERY (CARDIOTHORACIC VASCULAR SURGERY)

## 2018-12-26 PROCEDURE — 86850 RBC ANTIBODY SCREEN: CPT

## 2018-12-26 PROCEDURE — 7100000001 HC PACU RECOVERY - ADDTL 15 MIN: Performed by: THORACIC SURGERY (CARDIOTHORACIC VASCULAR SURGERY)

## 2018-12-26 PROCEDURE — 2709999900 HC NON-CHARGEABLE SUPPLY

## 2018-12-26 PROCEDURE — 03CL0ZZ EXTIRPATION OF MATTER FROM LEFT INTERNAL CAROTID ARTERY, OPEN APPROACH: ICD-10-PCS | Performed by: THORACIC SURGERY (CARDIOTHORACIC VASCULAR SURGERY)

## 2018-12-26 PROCEDURE — 85610 PROTHROMBIN TIME: CPT

## 2018-12-26 PROCEDURE — 3700000000 HC ANESTHESIA ATTENDED CARE: Performed by: THORACIC SURGERY (CARDIOTHORACIC VASCULAR SURGERY)

## 2018-12-26 PROCEDURE — 3600000004 HC SURGERY LEVEL 4 BASE: Performed by: THORACIC SURGERY (CARDIOTHORACIC VASCULAR SURGERY)

## 2018-12-26 PROCEDURE — 93010 ELECTROCARDIOGRAM REPORT: CPT | Performed by: INTERNAL MEDICINE

## 2018-12-26 PROCEDURE — 6370000000 HC RX 637 (ALT 250 FOR IP): Performed by: THORACIC SURGERY (CARDIOTHORACIC VASCULAR SURGERY)

## 2018-12-26 PROCEDURE — 6360000002 HC RX W HCPCS: Performed by: PHYSICIAN ASSISTANT

## 2018-12-26 PROCEDURE — 7100000000 HC PACU RECOVERY - FIRST 15 MIN: Performed by: THORACIC SURGERY (CARDIOTHORACIC VASCULAR SURGERY)

## 2018-12-26 PROCEDURE — 03UL0KZ SUPPLEMENT LEFT INTERNAL CAROTID ARTERY WITH NONAUTOLOGOUS TISSUE SUBSTITUTE, OPEN APPROACH: ICD-10-PCS | Performed by: THORACIC SURGERY (CARDIOTHORACIC VASCULAR SURGERY)

## 2018-12-26 PROCEDURE — 6360000002 HC RX W HCPCS: Performed by: ANESTHESIOLOGY

## 2018-12-26 PROCEDURE — 3600000014 HC SURGERY LEVEL 4 ADDTL 15MIN: Performed by: THORACIC SURGERY (CARDIOTHORACIC VASCULAR SURGERY)

## 2018-12-26 DEVICE — XENOSURE BIOLOGIC PATCH, 0.8CM X 8CM, EIFU
Type: IMPLANTABLE DEVICE | Status: FUNCTIONAL
Brand: XENOSURE BIOLOGIC PATCH

## 2018-12-26 RX ORDER — LABETALOL HYDROCHLORIDE 5 MG/ML
10 INJECTION, SOLUTION INTRAVENOUS EVERY 10 MIN PRN
Status: DISCONTINUED | OUTPATIENT
Start: 2018-12-26 | End: 2018-12-26 | Stop reason: HOSPADM

## 2018-12-26 RX ORDER — GLYCOPYRROLATE 1 MG/5 ML
SYRINGE (ML) INTRAVENOUS PRN
Status: DISCONTINUED | OUTPATIENT
Start: 2018-12-26 | End: 2018-12-26 | Stop reason: SDUPTHER

## 2018-12-26 RX ORDER — METOPROLOL TARTRATE 5 MG/5ML
INJECTION INTRAVENOUS PRN
Status: DISCONTINUED | OUTPATIENT
Start: 2018-12-26 | End: 2018-12-26 | Stop reason: SDUPTHER

## 2018-12-26 RX ORDER — FENTANYL CITRATE 50 UG/ML
50 INJECTION, SOLUTION INTRAMUSCULAR; INTRAVENOUS EVERY 5 MIN PRN
Status: DISCONTINUED | OUTPATIENT
Start: 2018-12-26 | End: 2018-12-26 | Stop reason: HOSPADM

## 2018-12-26 RX ORDER — ONDANSETRON 2 MG/ML
INJECTION INTRAMUSCULAR; INTRAVENOUS PRN
Status: DISCONTINUED | OUTPATIENT
Start: 2018-12-26 | End: 2018-12-26 | Stop reason: SDUPTHER

## 2018-12-26 RX ORDER — PROPOFOL 10 MG/ML
INJECTION, EMULSION INTRAVENOUS PRN
Status: DISCONTINUED | OUTPATIENT
Start: 2018-12-26 | End: 2018-12-26 | Stop reason: SDUPTHER

## 2018-12-26 RX ORDER — HEPARIN SODIUM 1000 [USP'U]/ML
INJECTION, SOLUTION INTRAVENOUS; SUBCUTANEOUS PRN
Status: DISCONTINUED | OUTPATIENT
Start: 2018-12-26 | End: 2018-12-26 | Stop reason: SDUPTHER

## 2018-12-26 RX ORDER — SODIUM CHLORIDE 0.9 % (FLUSH) 0.9 %
10 SYRINGE (ML) INJECTION EVERY 12 HOURS SCHEDULED
Status: DISCONTINUED | OUTPATIENT
Start: 2018-12-26 | End: 2018-12-27 | Stop reason: HOSPADM

## 2018-12-26 RX ORDER — ONDANSETRON 2 MG/ML
4 INJECTION INTRAMUSCULAR; INTRAVENOUS EVERY 6 HOURS PRN
Status: DISCONTINUED | OUTPATIENT
Start: 2018-12-26 | End: 2018-12-27 | Stop reason: HOSPADM

## 2018-12-26 RX ORDER — FAMOTIDINE 20 MG/1
20 TABLET, FILM COATED ORAL 2 TIMES DAILY
Status: DISCONTINUED | OUTPATIENT
Start: 2018-12-26 | End: 2018-12-27

## 2018-12-26 RX ORDER — SODIUM CHLORIDE 0.9 % (FLUSH) 0.9 %
10 SYRINGE (ML) INJECTION EVERY 12 HOURS SCHEDULED
Status: DISCONTINUED | OUTPATIENT
Start: 2018-12-26 | End: 2018-12-26 | Stop reason: HOSPADM

## 2018-12-26 RX ORDER — FENTANYL CITRATE 50 UG/ML
25 INJECTION, SOLUTION INTRAMUSCULAR; INTRAVENOUS EVERY 5 MIN PRN
Status: COMPLETED | OUTPATIENT
Start: 2018-12-26 | End: 2018-12-26

## 2018-12-26 RX ORDER — HYDRALAZINE HYDROCHLORIDE 20 MG/ML
INJECTION INTRAMUSCULAR; INTRAVENOUS PRN
Status: DISCONTINUED | OUTPATIENT
Start: 2018-12-26 | End: 2018-12-26 | Stop reason: SDUPTHER

## 2018-12-26 RX ORDER — LIDOCAINE HYDROCHLORIDE AND EPINEPHRINE 10; 10 MG/ML; UG/ML
INJECTION, SOLUTION INFILTRATION; PERINEURAL PRN
Status: DISCONTINUED | OUTPATIENT
Start: 2018-12-26 | End: 2018-12-26 | Stop reason: HOSPADM

## 2018-12-26 RX ORDER — NEOSTIGMINE METHYLSULFATE 1 MG/ML
INJECTION, SOLUTION INTRAVENOUS PRN
Status: DISCONTINUED | OUTPATIENT
Start: 2018-12-26 | End: 2018-12-26 | Stop reason: SDUPTHER

## 2018-12-26 RX ORDER — SODIUM CHLORIDE 450 MG/100ML
INJECTION, SOLUTION INTRAVENOUS CONTINUOUS
Status: DISCONTINUED | OUTPATIENT
Start: 2018-12-26 | End: 2018-12-27 | Stop reason: HOSPADM

## 2018-12-26 RX ORDER — OXYCODONE HYDROCHLORIDE 5 MG/1
5 TABLET ORAL EVERY 4 HOURS PRN
Status: DISCONTINUED | OUTPATIENT
Start: 2018-12-26 | End: 2018-12-27 | Stop reason: HOSPADM

## 2018-12-26 RX ORDER — LABETALOL HYDROCHLORIDE 5 MG/ML
10 INJECTION, SOLUTION INTRAVENOUS
Status: DISCONTINUED | OUTPATIENT
Start: 2018-12-26 | End: 2018-12-27 | Stop reason: HOSPADM

## 2018-12-26 RX ORDER — DOCUSATE SODIUM 100 MG/1
100 CAPSULE, LIQUID FILLED ORAL 2 TIMES DAILY
Status: DISCONTINUED | OUTPATIENT
Start: 2018-12-26 | End: 2018-12-27 | Stop reason: HOSPADM

## 2018-12-26 RX ORDER — OXYCODONE HYDROCHLORIDE 5 MG/1
10 TABLET ORAL EVERY 4 HOURS PRN
Status: DISCONTINUED | OUTPATIENT
Start: 2018-12-26 | End: 2018-12-27 | Stop reason: HOSPADM

## 2018-12-26 RX ORDER — DEXAMETHASONE SODIUM PHOSPHATE 4 MG/ML
INJECTION, SOLUTION INTRA-ARTICULAR; INTRALESIONAL; INTRAMUSCULAR; INTRAVENOUS; SOFT TISSUE PRN
Status: DISCONTINUED | OUTPATIENT
Start: 2018-12-26 | End: 2018-12-26 | Stop reason: SDUPTHER

## 2018-12-26 RX ORDER — SODIUM CHLORIDE 9 MG/ML
INJECTION, SOLUTION INTRAVENOUS CONTINUOUS
Status: DISCONTINUED | OUTPATIENT
Start: 2018-12-26 | End: 2018-12-26

## 2018-12-26 RX ORDER — ACETAMINOPHEN 325 MG/1
650 TABLET ORAL EVERY 4 HOURS PRN
Status: DISCONTINUED | OUTPATIENT
Start: 2018-12-26 | End: 2018-12-27 | Stop reason: HOSPADM

## 2018-12-26 RX ORDER — SODIUM CHLORIDE 0.9 % (FLUSH) 0.9 %
10 SYRINGE (ML) INJECTION PRN
Status: DISCONTINUED | OUTPATIENT
Start: 2018-12-26 | End: 2018-12-27 | Stop reason: HOSPADM

## 2018-12-26 RX ORDER — SODIUM CHLORIDE 0.9 % (FLUSH) 0.9 %
10 SYRINGE (ML) INJECTION PRN
Status: DISCONTINUED | OUTPATIENT
Start: 2018-12-26 | End: 2018-12-26 | Stop reason: HOSPADM

## 2018-12-26 RX ORDER — HEPARIN SODIUM 5000 [USP'U]/ML
INJECTION, SOLUTION INTRAVENOUS; SUBCUTANEOUS PRN
Status: DISCONTINUED | OUTPATIENT
Start: 2018-12-26 | End: 2018-12-26 | Stop reason: HOSPADM

## 2018-12-26 RX ORDER — LIDOCAINE HYDROCHLORIDE 20 MG/ML
INJECTION, SOLUTION INFILTRATION; PERINEURAL PRN
Status: DISCONTINUED | OUTPATIENT
Start: 2018-12-26 | End: 2018-12-26 | Stop reason: SDUPTHER

## 2018-12-26 RX ORDER — PROMETHAZINE HYDROCHLORIDE 25 MG/ML
12.5 INJECTION, SOLUTION INTRAMUSCULAR; INTRAVENOUS
Status: DISCONTINUED | OUTPATIENT
Start: 2018-12-26 | End: 2018-12-26 | Stop reason: HOSPADM

## 2018-12-26 RX ORDER — FENTANYL CITRATE 50 UG/ML
INJECTION, SOLUTION INTRAMUSCULAR; INTRAVENOUS PRN
Status: DISCONTINUED | OUTPATIENT
Start: 2018-12-26 | End: 2018-12-26 | Stop reason: SDUPTHER

## 2018-12-26 RX ORDER — ROCURONIUM BROMIDE 10 MG/ML
INJECTION, SOLUTION INTRAVENOUS PRN
Status: DISCONTINUED | OUTPATIENT
Start: 2018-12-26 | End: 2018-12-26 | Stop reason: SDUPTHER

## 2018-12-26 RX ORDER — EPHEDRINE SULFATE/0.9% NACL/PF 50 MG/5 ML
SYRINGE (ML) INTRAVENOUS PRN
Status: DISCONTINUED | OUTPATIENT
Start: 2018-12-26 | End: 2018-12-26 | Stop reason: SDUPTHER

## 2018-12-26 RX ADMIN — Medication 10 MG: at 12:44

## 2018-12-26 RX ADMIN — DOCUSATE SODIUM 100 MG: 100 CAPSULE, LIQUID FILLED ORAL at 20:21

## 2018-12-26 RX ADMIN — SODIUM CHLORIDE: 9 INJECTION, SOLUTION INTRAVENOUS at 11:10

## 2018-12-26 RX ADMIN — Medication 50 MCG: at 12:06

## 2018-12-26 RX ADMIN — LIDOCAINE HYDROCHLORIDE 100 MG: 20 INJECTION, SOLUTION INFILTRATION; PERINEURAL at 11:39

## 2018-12-26 RX ADMIN — DEXAMETHASONE SODIUM PHOSPHATE 8 MG: 4 INJECTION, SOLUTION INTRAMUSCULAR; INTRAVENOUS at 11:44

## 2018-12-26 RX ADMIN — FENTANYL CITRATE 50 MCG: 50 INJECTION INTRAMUSCULAR; INTRAVENOUS at 14:37

## 2018-12-26 RX ADMIN — FENTANYL CITRATE 25 MCG: 50 INJECTION INTRAMUSCULAR; INTRAVENOUS at 13:19

## 2018-12-26 RX ADMIN — Medication 10 MG: at 14:10

## 2018-12-26 RX ADMIN — FENTANYL CITRATE 25 MCG: 50 INJECTION INTRAMUSCULAR; INTRAVENOUS at 15:45

## 2018-12-26 RX ADMIN — ROCURONIUM BROMIDE 50 MG: 10 INJECTION INTRAVENOUS at 11:39

## 2018-12-26 RX ADMIN — Medication 3 MG: at 14:46

## 2018-12-26 RX ADMIN — SODIUM CHLORIDE: 4.5 INJECTION, SOLUTION INTRAVENOUS at 17:32

## 2018-12-26 RX ADMIN — ONDANSETRON 4 MG: 2 INJECTION INTRAMUSCULAR; INTRAVENOUS at 14:42

## 2018-12-26 RX ADMIN — FENTANYL CITRATE 25 MCG: 50 INJECTION INTRAMUSCULAR; INTRAVENOUS at 15:30

## 2018-12-26 RX ADMIN — Medication 10 ML: at 20:21

## 2018-12-26 RX ADMIN — PROPOFOL 200 MG: 10 INJECTION, EMULSION INTRAVENOUS at 11:39

## 2018-12-26 RX ADMIN — HYDRALAZINE HYDROCHLORIDE 5 MG: 20 INJECTION INTRAMUSCULAR; INTRAVENOUS at 14:44

## 2018-12-26 RX ADMIN — FENTANYL CITRATE 25 MCG: 50 INJECTION INTRAMUSCULAR; INTRAVENOUS at 13:21

## 2018-12-26 RX ADMIN — SODIUM CHLORIDE: 9 INJECTION, SOLUTION INTRAVENOUS at 15:05

## 2018-12-26 RX ADMIN — Medication 10 MG: at 12:59

## 2018-12-26 RX ADMIN — FENTANYL CITRATE 100 MCG: 50 INJECTION INTRAMUSCULAR; INTRAVENOUS at 11:39

## 2018-12-26 RX ADMIN — FENTANYL CITRATE 25 MCG: 50 INJECTION INTRAMUSCULAR; INTRAVENOUS at 16:00

## 2018-12-26 RX ADMIN — Medication 0.4 MG: at 14:47

## 2018-12-26 RX ADMIN — LABETALOL 20 MG/4 ML (5 MG/ML) INTRAVENOUS SYRINGE 10 MG: at 16:46

## 2018-12-26 RX ADMIN — FENTANYL CITRATE 25 MCG: 50 INJECTION INTRAMUSCULAR; INTRAVENOUS at 15:24

## 2018-12-26 RX ADMIN — PHENYLEPHRINE HYDROCHLORIDE 100 MCG: 10 INJECTION INTRAVENOUS at 12:23

## 2018-12-26 RX ADMIN — Medication 10 MG: at 14:23

## 2018-12-26 RX ADMIN — FAMOTIDINE 20 MG: 20 TABLET ORAL at 20:21

## 2018-12-26 RX ADMIN — OXYCODONE HYDROCHLORIDE 5 MG: 5 TABLET ORAL at 17:18

## 2018-12-26 RX ADMIN — DEXTROSE MONOHYDRATE 2 G: 50 INJECTION, SOLUTION INTRAVENOUS at 20:21

## 2018-12-26 RX ADMIN — FENTANYL CITRATE 50 MCG: 50 INJECTION INTRAMUSCULAR; INTRAVENOUS at 14:44

## 2018-12-26 RX ADMIN — HEPARIN SODIUM 8000 UNITS: 1000 INJECTION INTRAVENOUS; SUBCUTANEOUS at 13:39

## 2018-12-26 RX ADMIN — Medication 2 G: at 11:50

## 2018-12-26 RX ADMIN — SODIUM NITROPRUSSIDE 0.25 MCG/KG/MIN: 25 INJECTION, SOLUTION, CONCENTRATE INTRAVENOUS at 17:26

## 2018-12-26 RX ADMIN — Medication 10 MG: at 13:39

## 2018-12-26 RX ADMIN — ROCURONIUM BROMIDE 20 MG: 10 INJECTION INTRAVENOUS at 13:11

## 2018-12-26 RX ADMIN — METOPROLOL TARTRATE 2.5 MG: 5 INJECTION, SOLUTION INTRAVENOUS at 14:35

## 2018-12-26 ASSESSMENT — PULMONARY FUNCTION TESTS
PIF_VALUE: 23
PIF_VALUE: 21
PIF_VALUE: 22
PIF_VALUE: 0
PIF_VALUE: 20
PIF_VALUE: 25
PIF_VALUE: 20
PIF_VALUE: 3
PIF_VALUE: 19
PIF_VALUE: 20
PIF_VALUE: 21
PIF_VALUE: 19
PIF_VALUE: 2
PIF_VALUE: 22
PIF_VALUE: 0
PIF_VALUE: 20
PIF_VALUE: 20
PIF_VALUE: 22
PIF_VALUE: 20
PIF_VALUE: 21
PIF_VALUE: 23
PIF_VALUE: 19
PIF_VALUE: 16
PIF_VALUE: 20
PIF_VALUE: 21
PIF_VALUE: 22
PIF_VALUE: 23
PIF_VALUE: 20
PIF_VALUE: 0
PIF_VALUE: 20
PIF_VALUE: 20
PIF_VALUE: 0
PIF_VALUE: 21
PIF_VALUE: 0
PIF_VALUE: 24
PIF_VALUE: 3
PIF_VALUE: 0
PIF_VALUE: 3
PIF_VALUE: 19
PIF_VALUE: 20
PIF_VALUE: 21
PIF_VALUE: 20
PIF_VALUE: 21
PIF_VALUE: 20
PIF_VALUE: 21
PIF_VALUE: 20
PIF_VALUE: 19
PIF_VALUE: 20
PIF_VALUE: 0
PIF_VALUE: 19
PIF_VALUE: 20
PIF_VALUE: 2
PIF_VALUE: 20
PIF_VALUE: 17
PIF_VALUE: 21
PIF_VALUE: 22
PIF_VALUE: 20
PIF_VALUE: 30
PIF_VALUE: 21
PIF_VALUE: 25
PIF_VALUE: 19
PIF_VALUE: 20
PIF_VALUE: 22
PIF_VALUE: 20
PIF_VALUE: 21
PIF_VALUE: 20
PIF_VALUE: 21
PIF_VALUE: 20
PIF_VALUE: 21
PIF_VALUE: 20
PIF_VALUE: 21
PIF_VALUE: 24
PIF_VALUE: 21
PIF_VALUE: 3
PIF_VALUE: 20
PIF_VALUE: 21
PIF_VALUE: 21
PIF_VALUE: 20
PIF_VALUE: 20
PIF_VALUE: 21
PIF_VALUE: 25
PIF_VALUE: 23
PIF_VALUE: 21
PIF_VALUE: 20
PIF_VALUE: 21
PIF_VALUE: 23
PIF_VALUE: 20
PIF_VALUE: 21
PIF_VALUE: 19
PIF_VALUE: 2
PIF_VALUE: 22
PIF_VALUE: 20
PIF_VALUE: 20
PIF_VALUE: 19
PIF_VALUE: 22
PIF_VALUE: 22
PIF_VALUE: 20
PIF_VALUE: 19
PIF_VALUE: 20
PIF_VALUE: 21
PIF_VALUE: 24
PIF_VALUE: 3
PIF_VALUE: 21
PIF_VALUE: 20
PIF_VALUE: 20
PIF_VALUE: 21
PIF_VALUE: 19
PIF_VALUE: 20
PIF_VALUE: 20
PIF_VALUE: 3
PIF_VALUE: 20
PIF_VALUE: 21
PIF_VALUE: 20
PIF_VALUE: 19
PIF_VALUE: 20
PIF_VALUE: 21
PIF_VALUE: 20
PIF_VALUE: 3
PIF_VALUE: 21
PIF_VALUE: 20
PIF_VALUE: 22
PIF_VALUE: 20
PIF_VALUE: 18
PIF_VALUE: 21
PIF_VALUE: 23
PIF_VALUE: 21
PIF_VALUE: 2
PIF_VALUE: 16
PIF_VALUE: 21
PIF_VALUE: 20
PIF_VALUE: 20
PIF_VALUE: 21
PIF_VALUE: 19
PIF_VALUE: 21
PIF_VALUE: 2
PIF_VALUE: 1
PIF_VALUE: 20
PIF_VALUE: 23
PIF_VALUE: 2
PIF_VALUE: 20
PIF_VALUE: 20
PIF_VALUE: 21
PIF_VALUE: 0
PIF_VALUE: 21
PIF_VALUE: 21
PIF_VALUE: 20
PIF_VALUE: 21
PIF_VALUE: 3
PIF_VALUE: 20
PIF_VALUE: 24
PIF_VALUE: 20
PIF_VALUE: 20
PIF_VALUE: 21
PIF_VALUE: 21
PIF_VALUE: 3
PIF_VALUE: 21
PIF_VALUE: 19
PIF_VALUE: 23
PIF_VALUE: 20
PIF_VALUE: 20
PIF_VALUE: 23
PIF_VALUE: 20
PIF_VALUE: 22
PIF_VALUE: 21
PIF_VALUE: 21
PIF_VALUE: 3
PIF_VALUE: 20
PIF_VALUE: 21
PIF_VALUE: 20
PIF_VALUE: 23
PIF_VALUE: 20
PIF_VALUE: 21
PIF_VALUE: 20
PIF_VALUE: 5
PIF_VALUE: 24
PIF_VALUE: 22
PIF_VALUE: 22
PIF_VALUE: 20
PIF_VALUE: 19
PIF_VALUE: 24
PIF_VALUE: 21
PIF_VALUE: 20
PIF_VALUE: 21
PIF_VALUE: 23
PIF_VALUE: 20
PIF_VALUE: 20
PIF_VALUE: 14

## 2018-12-26 ASSESSMENT — PAIN SCALES - GENERAL
PAINLEVEL_OUTOF10: 4
PAINLEVEL_OUTOF10: 1
PAINLEVEL_OUTOF10: 1
PAINLEVEL_OUTOF10: 5

## 2018-12-26 ASSESSMENT — PAIN DESCRIPTION - PROGRESSION
CLINICAL_PROGRESSION: NOT CHANGED

## 2018-12-26 ASSESSMENT — PAIN - FUNCTIONAL ASSESSMENT: PAIN_FUNCTIONAL_ASSESSMENT: 0-10

## 2018-12-26 ASSESSMENT — PAIN DESCRIPTION - DESCRIPTORS
DESCRIPTORS: ACHING;DISCOMFORT
DESCRIPTORS: DISCOMFORT

## 2018-12-26 ASSESSMENT — PAIN DESCRIPTION - ONSET
ONSET: ON-GOING

## 2018-12-26 ASSESSMENT — PAIN DESCRIPTION - PAIN TYPE: TYPE: SURGICAL PAIN

## 2018-12-26 ASSESSMENT — PAIN DESCRIPTION - LOCATION
LOCATION: NECK
LOCATION: NECK

## 2018-12-26 ASSESSMENT — PAIN SCALES - WONG BAKER: WONGBAKER_NUMERICALRESPONSE: 0

## 2018-12-26 ASSESSMENT — PAIN DESCRIPTION - ORIENTATION
ORIENTATION: LEFT
ORIENTATION: LEFT

## 2018-12-26 NOTE — H&P
Negative. Cardiovascular: Negative. Gastrointestinal: Negative. Endocrine: Negative. Genitourinary: Negative. Musculoskeletal: Negative. Skin: Negative. Allergic/Immunologic: Negative. Neurological: Negative. Hematological: Negative. Psychiatric/Behavioral: Negative.          Objective:   Physical Exam      Temp          97.6 . .. Temp Source          Tempo. .. Heart Rate          58     Resp Rate          16     BP (cuff)          143/78     MAP (cuff)          104     Position          Sitting     02 Sat          100     Weight          213 l. .. BMI          30.7     Oxygenation    O2 Device          None           Constitutional: He is oriented to person, place, and time. He appears well-developed and well-nourished. No distress. HENT:   Head: Normocephalic and atraumatic. Right Ear: External ear normal.   Left Ear: External ear normal.   Nose: Nose normal.   Mouth/Throat: Oropharynx is clear and moist. No oropharyngeal exudate. Eyes: Pupils are equal, round, and reactive to light. Conjunctivae and EOM are normal. Right eye exhibits no discharge. Left eye exhibits no discharge. No scleral icterus. Neck: Normal range of motion. Neck supple. No JVD present. No tracheal deviation present. No thyromegaly present. Cardiovascular: Normal rate. Exam reveals no gallop and no friction rub. Murmur heard. Crescendo systolic murmur is present with a grade of 2/6   Pulses:       Carotid pulses are 2+ on the right side, and 2+ on the left side. Radial pulses are 2+ on the right side, and 2+ on the left side. Femoral pulses are 0 on the right side, and 1+ on the left side. Dorsalis pedis pulses are 0 on the right side, and 0 on the left side. Posterior tibial pulses are 0 on the right side, and 0 on the left side. Pulmonary/Chest: Effort normal and breath sounds normal. No stridor. No respiratory distress. He has no wheezes. He has no rales.  He exhibits no tenderness. Abdominal: Soft. Bowel sounds are normal. He exhibits no distension and no mass. There is no tenderness. There is no rebound and no guarding. No hernia. Musculoskeletal: Normal range of motion. He exhibits no edema, tenderness or deformity. Lymphadenopathy:     He has no cervical adenopathy. Neurological: He is alert and oriented to person, place, and time. He displays normal reflexes. No cranial nerve deficit or sensory deficit. He exhibits normal muscle tone. Coordination normal.   Skin: Skin is warm and dry. Capillary refill takes less than 2 seconds. No rash noted. He is not diaphoretic. No erythema. No pallor. Psychiatric: He has a normal mood and affect. His behavior is normal. Judgment and thought content normal.     Informed consent:     Recommend left carotid endarterectomy in an effort to try to lower her risks of a potentially fatal or incapacitating stroke. Potential risks of surgery like stroke, death, bleeding, infection, damage to arteries, veins nerves, hyperperfusion syndrome, difficulty swallowing, moving tongue, speech,hoarsenes, difficulty moving face to include some. Mr and Mrs Dontae Mcmillan very well appeared to understand, all of their questions were answered to their satisfaction and he is agreeable to proceed with left carotid endarterectomy as advised.     Current Medication      Current Outpatient Prescriptions:     atorvastatin (LIPITOR) 80 MG tablet, Take 1 tablet by mouth daily, Disp: 30 tablet, Rfl: 0    metoprolol tartrate (LOPRESSOR) 25 MG tablet, Take 1 tablet by mouth 2 times daily, Disp: 60 tablet, Rfl: 0    metFORMIN (GLUCOPHAGE XR) 500 MG extended release tablet, Take 1 tablet by mouth Daily with supper, Disp: 30 tablet, Rfl: 0    isosorbide mononitrate (IMDUR) 60 MG extended release tablet, Take 1 tablet by mouth daily, Disp: 30 tablet, Rfl: 0    pantoprazole (PROTONIX) 40 MG tablet, Take 1 tablet by mouth every morning (before breakfast), Disp: 30

## 2018-12-26 NOTE — ANESTHESIA PRE PROCEDURE
Department of Anesthesiology  Preprocedure Note       Name:  Makenna Maxwell   Age:  77 y.o.  :  1950                                          MRN:  [id number]         Date:  2018      Surgeon: Candance Pottier):  Lurdes Mckeon MD    Procedure: LEFT CAROTID ENDARTERECTOMY (Left Neck)    Medications prior to admission:   Prior to Admission medications    Medication Sig Start Date End Date Taking?  Authorizing Provider   isosorbide mononitrate (IMDUR) 120 MG extended release tablet Take 1 tablet by mouth daily 18  Yes Machelle Baker DO   atorvastatin (LIPITOR) 80 MG tablet Take 1 tablet by mouth daily 11/10/18  Yes Melody Rodas MD   metoprolol tartrate (LOPRESSOR) 25 MG tablet Take 1 tablet by mouth 2 times daily 11/10/18  Yes Melody Rodas MD   metFORMIN (GLUCOPHAGE XR) 500 MG extended release tablet Take 1 tablet by mouth Daily with supper 11/10/18  Yes Melody Rodas MD   pantoprazole (PROTONIX) 40 MG tablet Take 1 tablet by mouth every morning (before breakfast) 18  Yes LAURA Fang - ROHAN   hydrochlorothiazide (HYDRODIURIL) 25 MG tablet Take 25 mg by mouth daily   Yes Historical Provider, MD   cilostazol (PLETAL) 100 MG tablet Take 100 mg by mouth 2 times daily   Yes Historical Provider, MD   spironolactone (ALDACTONE) 25 MG tablet Take 1 tablet by mouth daily 16  Yes Rachael Win MD   amLODIPine (NORVASC) 10 MG tablet Take 1 tablet by mouth daily 16  Yes Rachael Win MD   albuterol sulfate  (90 Base) MCG/ACT inhaler Inhale 2 puffs into the lungs every 4 hours as needed for Wheezing or Shortness of Breath    Historical Provider, MD   Tiotropium Bromide-Olodaterol (STIOLTO RESPIMAT) 2.5-2.5 MCG/ACT AERS Inhale 2 puffs into the lungs daily    Historical Provider, MD   Omega 3-6-9 Fatty Acids (GNP TRIPLE OMEGA COMPLEX PO) Take 2 capsules by mouth 2 times daily    Historical Provider, MD   nitroGLYCERIN (NITROSTAT) 0.4 MG SL tablet Place 1 tablet under the tongue every 5 minutes as needed for Chest pain 5/25/16   Thomas Zuniga MD   aspirin 81 MG EC tablet Take 81 mg by mouth daily    Historical Provider, MD       Current medications:    Current Facility-Administered Medications   Medication Dose Route Frequency Provider Last Rate Last Dose    ceFAZolin (ANCEF) 2 g in dextrose 5 % 50 mL IVPB  2 g Intravenous On Call to [address]HAYLIE        sodium chloride flush 0.9 % injection 10 mL  10 mL Intravenous 2 times per day Shonna Sol, PA-C        sodium chloride flush 0.9 % injection 10 mL  10 mL Intravenous PRN Shonna Sol, PA-C           Allergies:  No Known Allergies    Problem List:    Patient Active Problem List   Diagnosis Code    Essential hypertension I10    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    Chest pain R07.9    Chronic obstructive pulmonary disease (HCC) J44.9    PTSD (post-traumatic stress disorder) F43.10    Osteoarthritis of multiple joints M15.9    Peripheral vascular disease ([facility] I73.9    Diabetes mellitus type 2, uncontrolled ([facility] E11.65    Descending thoracic aortic aneurysm ([facility] [facility] I71.2    Internal carotid artery stenosis, left I65.22    Chronic diastolic heart failure (HCC) I50.32    Hypertensive urgency I16.0    CAD (coronary artery disease) I25.10       Past Medical History:        Diagnosis Date    Arthritis     Asthma     Blood circulation, collateral     bilateral LE    CAD (coronary artery disease)     COPD (chronic obstructive pulmonary disease) (HCC)     Diabetes mellitus (HCC)     GERD (gastroesophageal reflux disease)     Hyperlipidemia     Hypertension     Other disorders of kidney and ureter in diseases classified elsewhere     Psychiatric problem        Past Surgical History:        Procedure Laterality Date    CARDIAC CATHETERIZATION      COLONOSCOPY      EYE SURGERY      right eye for detached retina    TONSILLECTOMY         Social History:    Social History Substance Use Topics    Smoking status: Former Smoker     Years: 20.00    Smokeless tobacco: Never Used    Alcohol use No                                Counseling given: Not Answered      Vital Signs (Current):   Vitals:    12/26/18 1023   BP: (!) 143/78   Pulse: 58   Resp: 16   Temp: 97.6 °F (36.4 °C)   TempSrc: Temporal   SpO2: 100%   Weight: 213 lb 6.4 oz (96.8 kg)   Height: 5' 10\" (1.778 m)                                              BP Readings from Last 3 Encounters:   12/26/18 (!) 143/78   12/20/18 (!) 160/81   12/17/18 (!) 160/75       NPO Status: Time of last liquid consumption: 0830                        Time of last solid consumption: 0220                        Date of last liquid consumption: 12/26/18                        Date of last solid food consumption: 12/25/18    BMI:   Wt Readings from Last 3 Encounters:   12/26/18 213 lb 6.4 oz (96.8 kg)   12/19/18 213 lb 12.8 oz (97 kg)   12/17/18 216 lb 9.6 oz (98.2 kg)     Body mass index is 30.62 kg/m². CBC:   Lab Results   Component Value Date    WBC 9.0 12/19/2018    RBC 4.36 12/19/2018    HGB 12.4 12/19/2018    HCT 38.8 12/19/2018    MCV 89.0 12/19/2018    RDW 12.6 05/25/2016     12/19/2018       CMP:   Lab Results   Component Value Date     12/20/2018    K 4.3 12/20/2018    K 3.6 11/06/2018     12/20/2018    CO2 28 12/20/2018    BUN 18 12/20/2018    CREATININE 0.8 12/20/2018    LABGLOM >90 12/20/2018    GLUCOSE 175 12/20/2018    PROT 6.2 05/25/2016    CALCIUM 9.5 12/20/2018    BILITOT 0.9 05/25/2016    ALKPHOS 85 05/25/2016    AST 17 05/25/2016    ALT 17 05/25/2016       POC Tests: No results for input(s): POCGLU, POCNA, POCK, POCCL, POCBUN, POCHEMO, POCHCT in the last 72 hours.     Coags:   Lab Results   Component Value Date    INR 0.95 12/19/2018    APTT 32.0 12/19/2018       HCG (If Applicable): No results found for: PREGTESTUR, PREGSERUM, HCG, HCGQUANT     ABGs: No results found for: PHART, PO2ART, HVC7DLI, AAA0OMW,

## 2018-12-27 VITALS
TEMPERATURE: 98.5 F | OXYGEN SATURATION: 98 % | HEART RATE: 69 BPM | RESPIRATION RATE: 18 BRPM | BODY MASS INDEX: 30.55 KG/M2 | SYSTOLIC BLOOD PRESSURE: 146 MMHG | DIASTOLIC BLOOD PRESSURE: 84 MMHG | WEIGHT: 213.4 LBS | HEIGHT: 70 IN

## 2018-12-27 LAB
ERYTHROCYTE [DISTWIDTH] IN BLOOD BY AUTOMATED COUNT: 11.9 % (ref 11.5–14.5)
ERYTHROCYTE [DISTWIDTH] IN BLOOD BY AUTOMATED COUNT: 38.1 FL (ref 35–45)
HCT VFR BLD CALC: 30.7 % (ref 42–52)
HEMOGLOBIN: 9.9 GM/DL (ref 14–18)
MCH RBC QN AUTO: 28.9 PG (ref 26–33)
MCHC RBC AUTO-ENTMCNC: 32.2 GM/DL (ref 32.2–35.5)
MCV RBC AUTO: 89.8 FL (ref 80–94)
PLATELET # BLD: 216 THOU/MM3 (ref 130–400)
PMV BLD AUTO: 12.8 FL (ref 9.4–12.4)
RBC # BLD: 3.42 MILL/MM3 (ref 4.7–6.1)
WBC # BLD: 12.6 THOU/MM3 (ref 4.8–10.8)

## 2018-12-27 PROCEDURE — 2580000003 HC RX 258: Performed by: THORACIC SURGERY (CARDIOTHORACIC VASCULAR SURGERY)

## 2018-12-27 PROCEDURE — 2500000003 HC RX 250 WO HCPCS: Performed by: THORACIC SURGERY (CARDIOTHORACIC VASCULAR SURGERY)

## 2018-12-27 PROCEDURE — APPSS60 APP SPLIT SHARED TIME 46-60 MINUTES: Performed by: PHYSICIAN ASSISTANT

## 2018-12-27 PROCEDURE — 2709999900 HC NON-CHARGEABLE SUPPLY

## 2018-12-27 PROCEDURE — 6360000002 HC RX W HCPCS: Performed by: THORACIC SURGERY (CARDIOTHORACIC VASCULAR SURGERY)

## 2018-12-27 PROCEDURE — 6370000000 HC RX 637 (ALT 250 FOR IP): Performed by: THORACIC SURGERY (CARDIOTHORACIC VASCULAR SURGERY)

## 2018-12-27 PROCEDURE — 36415 COLL VENOUS BLD VENIPUNCTURE: CPT

## 2018-12-27 PROCEDURE — 85027 COMPLETE CBC AUTOMATED: CPT

## 2018-12-27 PROCEDURE — 6370000000 HC RX 637 (ALT 250 FOR IP): Performed by: PHYSICIAN ASSISTANT

## 2018-12-27 RX ORDER — SPIRONOLACTONE 25 MG/1
50 TABLET ORAL DAILY
Status: DISCONTINUED | OUTPATIENT
Start: 2018-12-28 | End: 2018-12-27 | Stop reason: HOSPADM

## 2018-12-27 RX ORDER — ACETAMINOPHEN 325 MG/1
650 TABLET ORAL EVERY 4 HOURS PRN
Qty: 120 TABLET | Refills: 3 | Status: SHIPPED | OUTPATIENT
Start: 2018-12-27

## 2018-12-27 RX ORDER — NITROGLYCERIN 0.4 MG/1
0.4 TABLET SUBLINGUAL EVERY 5 MIN PRN
Status: DISCONTINUED | OUTPATIENT
Start: 2018-12-27 | End: 2018-12-27 | Stop reason: HOSPADM

## 2018-12-27 RX ORDER — AMLODIPINE BESYLATE 10 MG/1
10 TABLET ORAL DAILY
Status: DISCONTINUED | OUTPATIENT
Start: 2018-12-27 | End: 2018-12-27 | Stop reason: HOSPADM

## 2018-12-27 RX ORDER — SPIRONOLACTONE 50 MG/1
50 TABLET, FILM COATED ORAL DAILY
Qty: 30 TABLET | Refills: 3 | Status: ON HOLD | OUTPATIENT
Start: 2018-12-28 | End: 2022-04-06 | Stop reason: ALTCHOICE

## 2018-12-27 RX ORDER — HYDROCHLOROTHIAZIDE 25 MG/1
25 TABLET ORAL DAILY
Status: DISCONTINUED | OUTPATIENT
Start: 2018-12-27 | End: 2018-12-27 | Stop reason: HOSPADM

## 2018-12-27 RX ORDER — ASPIRIN 81 MG/1
81 TABLET ORAL DAILY
Status: DISCONTINUED | OUTPATIENT
Start: 2018-12-27 | End: 2018-12-27 | Stop reason: HOSPADM

## 2018-12-27 RX ORDER — CARVEDILOL 6.25 MG/1
12.5 TABLET ORAL 2 TIMES DAILY
Status: DISCONTINUED | OUTPATIENT
Start: 2018-12-27 | End: 2018-12-27 | Stop reason: HOSPADM

## 2018-12-27 RX ORDER — CILOSTAZOL 100 MG/1
100 TABLET ORAL 2 TIMES DAILY
Status: DISCONTINUED | OUTPATIENT
Start: 2018-12-27 | End: 2018-12-27 | Stop reason: HOSPADM

## 2018-12-27 RX ORDER — SPIRONOLACTONE 25 MG/1
25 TABLET ORAL DAILY
Status: DISCONTINUED | OUTPATIENT
Start: 2018-12-27 | End: 2018-12-27

## 2018-12-27 RX ORDER — ALBUTEROL SULFATE 90 UG/1
2 AEROSOL, METERED RESPIRATORY (INHALATION) EVERY 4 HOURS PRN
Status: DISCONTINUED | OUTPATIENT
Start: 2018-12-27 | End: 2018-12-27 | Stop reason: HOSPADM

## 2018-12-27 RX ORDER — ISOSORBIDE MONONITRATE 60 MG/1
120 TABLET, EXTENDED RELEASE ORAL DAILY
Status: DISCONTINUED | OUTPATIENT
Start: 2018-12-27 | End: 2018-12-27 | Stop reason: HOSPADM

## 2018-12-27 RX ORDER — SPIRONOLACTONE 25 MG/1
25 TABLET ORAL ONCE
Status: COMPLETED | OUTPATIENT
Start: 2018-12-27 | End: 2018-12-27

## 2018-12-27 RX ORDER — CARVEDILOL 12.5 MG/1
12.5 TABLET ORAL 2 TIMES DAILY
Qty: 60 TABLET | Refills: 3 | Status: SHIPPED | OUTPATIENT
Start: 2018-12-27 | End: 2019-01-25 | Stop reason: ALTCHOICE

## 2018-12-27 RX ORDER — PANTOPRAZOLE SODIUM 40 MG/1
40 TABLET, DELAYED RELEASE ORAL
Status: DISCONTINUED | OUTPATIENT
Start: 2018-12-27 | End: 2018-12-27 | Stop reason: HOSPADM

## 2018-12-27 RX ORDER — CLOPIDOGREL BISULFATE 75 MG/1
75 TABLET ORAL DAILY
Qty: 30 TABLET | Refills: 3 | Status: SHIPPED | OUTPATIENT
Start: 2018-12-27 | End: 2019-01-25 | Stop reason: ALTCHOICE

## 2018-12-27 RX ORDER — ATORVASTATIN CALCIUM 80 MG/1
80 TABLET, FILM COATED ORAL DAILY
Status: DISCONTINUED | OUTPATIENT
Start: 2018-12-27 | End: 2018-12-27 | Stop reason: HOSPADM

## 2018-12-27 RX ORDER — METFORMIN HYDROCHLORIDE 500 MG/1
500 TABLET, EXTENDED RELEASE ORAL
Status: DISCONTINUED | OUTPATIENT
Start: 2018-12-27 | End: 2018-12-27 | Stop reason: HOSPADM

## 2018-12-27 RX ADMIN — SPIRONOLACTONE 25 MG: 25 TABLET, FILM COATED ORAL at 08:52

## 2018-12-27 RX ADMIN — CILOSTAZOL 100 MG: 100 TABLET ORAL at 08:52

## 2018-12-27 RX ADMIN — METOPROLOL TARTRATE 25 MG: 25 TABLET, FILM COATED ORAL at 08:52

## 2018-12-27 RX ADMIN — PANTOPRAZOLE SODIUM 40 MG: 40 TABLET, DELAYED RELEASE ORAL at 08:55

## 2018-12-27 RX ADMIN — ENOXAPARIN SODIUM 40 MG: 40 INJECTION SUBCUTANEOUS at 08:51

## 2018-12-27 RX ADMIN — OXYCODONE HYDROCHLORIDE 5 MG: 5 TABLET ORAL at 01:19

## 2018-12-27 RX ADMIN — ATORVASTATIN CALCIUM 80 MG: 80 TABLET, FILM COATED ORAL at 08:52

## 2018-12-27 RX ADMIN — AMLODIPINE BESYLATE 10 MG: 10 TABLET ORAL at 08:52

## 2018-12-27 RX ADMIN — ASPIRIN 81 MG: 81 TABLET, COATED ORAL at 08:51

## 2018-12-27 RX ADMIN — DEXTROSE MONOHYDRATE 2 G: 50 INJECTION, SOLUTION INTRAVENOUS at 04:39

## 2018-12-27 RX ADMIN — SODIUM NITROPRUSSIDE 1.25 MCG/KG/MIN: 25 INJECTION, SOLUTION, CONCENTRATE INTRAVENOUS at 01:24

## 2018-12-27 RX ADMIN — ISOSORBIDE MONONITRATE 120 MG: 60 TABLET ORAL at 08:52

## 2018-12-27 RX ADMIN — SPIRONOLACTONE 25 MG: 25 TABLET, FILM COATED ORAL at 13:35

## 2018-12-27 RX ADMIN — DOCUSATE SODIUM 100 MG: 100 CAPSULE, LIQUID FILLED ORAL at 08:51

## 2018-12-27 RX ADMIN — CARVEDILOL 12.5 MG: 6.25 TABLET, FILM COATED ORAL at 13:35

## 2018-12-27 RX ADMIN — LABETALOL 20 MG/4 ML (5 MG/ML) INTRAVENOUS SYRINGE 10 MG: at 12:13

## 2018-12-27 RX ADMIN — Medication 10 ML: at 08:53

## 2018-12-27 RX ADMIN — SODIUM NITROPRUSSIDE 1.75 MCG/KG/MIN: 25 INJECTION, SOLUTION, CONCENTRATE INTRAVENOUS at 10:12

## 2018-12-27 RX ADMIN — HYDROCHLOROTHIAZIDE 25 MG: 25 TABLET ORAL at 08:52

## 2018-12-27 RX ADMIN — SODIUM NITROPRUSSIDE 2.5 MCG/KG/MIN: 25 INJECTION, SOLUTION, CONCENTRATE INTRAVENOUS at 05:57

## 2018-12-27 ASSESSMENT — PAIN SCALES - GENERAL: PAINLEVEL_OUTOF10: 4

## 2018-12-27 NOTE — PROGRESS NOTES
Patient ready for discharge. Patient understands plan of care. All questions addressed and answered. Follow up appointments made. Patient states he will call his PCP and make that follow up appointment. All belongs were sent with patient and son.

## 2018-12-27 NOTE — OP NOTE
shunt was allowed to  stay open. Prior to inserting the shunt, copious irrigation of the  distal left internal carotid stump was done with heparinized saline  until all the cholesterol paste was removed. Once the shunt was in  place, the two arteriotomies were connected by incising over the plaque  with a [place] blade. Endarterectomy plane was developed with the sharp  edge of a periosteal elevator. Distal cut-off on the internal carotid  was spontaneous with a very smooth transition into very healthy adherent  intima. Proximal cut-off was done sharply with [place] scissors and  eversion endarterectomy of the external was done. After this was done,  the endarterectomized vessel was prepared under 5.5x modification until  very smooth and free of any loose debris and a [place]ovine pericardial patch  was sewn in place with 3 interrupted 7-0 polypropylene sutures in the  superior apex and a 6-0 [place]-Narciso U-stitch in the distal apex. Posterior  suture line was done first with continuous interlocking 6-0 [place]-Narciso  suture and the suture was advanced from both ends and it was tied and  secured FDC in the posterior half of the patch angioplasty. The  anterior half of the patch angioplasty was then completed and when only  a few millimeters were left to complete the patch angioplasty, the shunt  was clamped and removed the vessels flushed. The suture line completed,  the distal internal released to de-air the endarterectomized vessel and  upon de-airing, traction was applied to the continuous suture line for  hemostasis. The internal carotid was occluded, external carotid was  released. The common carotid clamp was released and after four  heartbeats, the internal was released. The continuous suture line was  then tied and secured for hemostasis. Hemostasis was secured in the  field. The systemic blood pressure was then lowered to 100 to 120.    Hemostasis was secured in the field and a #10 flat Dexter-Parra

## 2018-12-28 LAB — MRSA SCREEN: NORMAL

## 2019-01-30 ENCOUNTER — OFFICE VISIT (OUTPATIENT)
Dept: CARDIOTHORACIC SURGERY | Age: 69
End: 2019-01-30

## 2019-01-30 VITALS
BODY MASS INDEX: 29.66 KG/M2 | HEART RATE: 58 BPM | DIASTOLIC BLOOD PRESSURE: 81 MMHG | WEIGHT: 207.2 LBS | SYSTOLIC BLOOD PRESSURE: 137 MMHG | HEIGHT: 70 IN

## 2019-01-30 DIAGNOSIS — I10 ESSENTIAL HYPERTENSION: ICD-10-CM

## 2019-01-30 PROCEDURE — 99024 POSTOP FOLLOW-UP VISIT: CPT | Performed by: PHYSICIAN ASSISTANT

## 2019-01-30 PROCEDURE — APPSS30 APP SPLIT SHARED TIME 16-30 MINUTES: Performed by: PHYSICIAN ASSISTANT

## 2019-02-04 ENCOUNTER — HOSPITAL ENCOUNTER (OUTPATIENT)
Dept: INTERVENTIONAL RADIOLOGY/VASCULAR | Age: 69
Discharge: HOME | End: 2019-02-04
Attending: RADIOLOGY | Admitting: RADIOLOGY
Payer: OTHER GOVERNMENT

## 2019-02-04 VITALS
DIASTOLIC BLOOD PRESSURE: 64 MMHG | HEART RATE: 68 BPM | HEIGHT: 70 IN | BODY MASS INDEX: 29.63 KG/M2 | WEIGHT: 207 LBS | OXYGEN SATURATION: 98 % | TEMPERATURE: 97.6 F | SYSTOLIC BLOOD PRESSURE: 130 MMHG | RESPIRATION RATE: 16 BRPM

## 2019-02-04 LAB
ANION GAP SERPL CALCULATED.3IONS-SCNC: 14 MEQ/L (ref 8–16)
BUN BLDV-MCNC: 15 MG/DL (ref 7–22)
CALCIUM SERPL-MCNC: 9.4 MG/DL (ref 8.5–10.5)
CHLORIDE BLD-SCNC: 101 MEQ/L (ref 98–111)
CO2: 23 MEQ/L (ref 23–33)
CREAT SERPL-MCNC: 0.9 MG/DL (ref 0.4–1.2)
ERYTHROCYTE [DISTWIDTH] IN BLOOD BY AUTOMATED COUNT: 12.1 % (ref 11.5–14.5)
ERYTHROCYTE [DISTWIDTH] IN BLOOD BY AUTOMATED COUNT: 39.4 FL (ref 35–45)
GFR SERPL CREATININE-BSD FRML MDRD: > 90 ML/MIN/1.73M2
GLUCOSE BLD-MCNC: 184 MG/DL (ref 70–108)
HCT VFR BLD CALC: 38.3 % (ref 42–52)
HEMOGLOBIN: 12 GM/DL (ref 14–18)
MCH RBC QN AUTO: 28.2 PG (ref 26–33)
MCHC RBC AUTO-ENTMCNC: 31.3 GM/DL (ref 32.2–35.5)
MCV RBC AUTO: 90.1 FL (ref 80–94)
PLATELET # BLD: 217 THOU/MM3 (ref 130–400)
PMV BLD AUTO: 12.8 FL (ref 9.4–12.4)
POTASSIUM SERPL-SCNC: 3.5 MEQ/L (ref 3.5–5.2)
RBC # BLD: 4.25 MILL/MM3 (ref 4.7–6.1)
SODIUM BLD-SCNC: 138 MEQ/L (ref 135–145)
WBC # BLD: 8.8 THOU/MM3 (ref 4.8–10.8)

## 2019-02-04 PROCEDURE — 2500000003 HC RX 250 WO HCPCS

## 2019-02-04 PROCEDURE — 96365 THER/PROPH/DIAG IV INF INIT: CPT

## 2019-02-04 PROCEDURE — 85027 COMPLETE CBC AUTOMATED: CPT

## 2019-02-04 PROCEDURE — C1894 INTRO/SHEATH, NON-LASER: HCPCS

## 2019-02-04 PROCEDURE — C1725 CATH, TRANSLUMIN NON-LASER: HCPCS

## 2019-02-04 PROCEDURE — C1769 GUIDE WIRE: HCPCS

## 2019-02-04 PROCEDURE — 6360000002 HC RX W HCPCS: Performed by: RADIOLOGY

## 2019-02-04 PROCEDURE — 96360 HYDRATION IV INFUSION INIT: CPT

## 2019-02-04 PROCEDURE — 37226 HC FEMPOP PLASTY & STENT: CPT | Performed by: RADIOLOGY

## 2019-02-04 PROCEDURE — 75716 ARTERY X-RAYS ARMS/LEGS: CPT | Performed by: RADIOLOGY

## 2019-02-04 PROCEDURE — 2580000003 HC RX 258: Performed by: RADIOLOGY

## 2019-02-04 PROCEDURE — 80048 BASIC METABOLIC PNL TOTAL CA: CPT

## 2019-02-04 PROCEDURE — 6360000004 HC RX CONTRAST MEDICATION: Performed by: RADIOLOGY

## 2019-02-04 PROCEDURE — 36415 COLL VENOUS BLD VENIPUNCTURE: CPT

## 2019-02-04 PROCEDURE — C1876 STENT, NON-COA/NON-COV W/DEL: HCPCS

## 2019-02-04 PROCEDURE — 6360000002 HC RX W HCPCS

## 2019-02-04 PROCEDURE — 75625 CONTRAST EXAM ABDOMINL AORTA: CPT | Performed by: RADIOLOGY

## 2019-02-04 PROCEDURE — C1760 CLOSURE DEV, VASC: HCPCS

## 2019-02-04 PROCEDURE — 6370000000 HC RX 637 (ALT 250 FOR IP): Performed by: RADIOLOGY

## 2019-02-04 PROCEDURE — 2709999900 HC NON-CHARGEABLE SUPPLY

## 2019-02-04 PROCEDURE — 6370000000 HC RX 637 (ALT 250 FOR IP)

## 2019-02-04 PROCEDURE — 37221 HC PLASTY ILIAC W STENT: CPT | Performed by: RADIOLOGY

## 2019-02-04 RX ORDER — SODIUM CHLORIDE 450 MG/100ML
INJECTION, SOLUTION INTRAVENOUS CONTINUOUS
Status: DISCONTINUED | OUTPATIENT
Start: 2019-02-04 | End: 2019-02-04 | Stop reason: HOSPADM

## 2019-02-04 RX ORDER — HEPARIN SODIUM 1000 [USP'U]/ML
2000 INJECTION, SOLUTION INTRAVENOUS; SUBCUTANEOUS ONCE
Status: COMPLETED | OUTPATIENT
Start: 2019-02-04 | End: 2019-02-04

## 2019-02-04 RX ORDER — GINSENG 100 MG
CAPSULE ORAL ONCE
Status: COMPLETED | OUTPATIENT
Start: 2019-02-04 | End: 2019-02-04

## 2019-02-04 RX ORDER — IBUPROFEN 200 MG
TABLET ORAL ONCE
Status: DISCONTINUED | OUTPATIENT
Start: 2019-02-04 | End: 2019-02-04 | Stop reason: HOSPADM

## 2019-02-04 RX ORDER — MIDAZOLAM HYDROCHLORIDE 1 MG/ML
1 INJECTION INTRAMUSCULAR; INTRAVENOUS ONCE
Status: COMPLETED | OUTPATIENT
Start: 2019-02-04 | End: 2019-02-04

## 2019-02-04 RX ORDER — FENTANYL CITRATE 50 UG/ML
50 INJECTION, SOLUTION INTRAMUSCULAR; INTRAVENOUS ONCE
Status: COMPLETED | OUTPATIENT
Start: 2019-02-04 | End: 2019-02-04

## 2019-02-04 RX ORDER — CLOPIDOGREL BISULFATE 75 MG/1
300 TABLET ORAL ONCE
Status: COMPLETED | OUTPATIENT
Start: 2019-02-04 | End: 2019-02-04

## 2019-02-04 RX ORDER — MIDAZOLAM HYDROCHLORIDE 1 MG/ML
0.5 INJECTION INTRAMUSCULAR; INTRAVENOUS ONCE
Status: COMPLETED | OUTPATIENT
Start: 2019-02-04 | End: 2019-02-04

## 2019-02-04 RX ORDER — CEFAZOLIN SODIUM 1 G/50ML
1 INJECTION, SOLUTION INTRAVENOUS ONCE
Status: COMPLETED | OUTPATIENT
Start: 2019-02-04 | End: 2019-02-04

## 2019-02-04 RX ORDER — FENTANYL CITRATE 50 UG/ML
25 INJECTION, SOLUTION INTRAMUSCULAR; INTRAVENOUS ONCE
Status: COMPLETED | OUTPATIENT
Start: 2019-02-04 | End: 2019-02-04

## 2019-02-04 RX ORDER — BACITRACIN, NEOMYCIN, POLYMYXIN B 400; 3.5; 5 [USP'U]/G; MG/G; [USP'U]/G
OINTMENT TOPICAL ONCE
Status: DISCONTINUED | OUTPATIENT
Start: 2019-02-04 | End: 2019-02-04 | Stop reason: CLARIF

## 2019-02-04 RX ADMIN — HEPARIN SODIUM 2000 UNITS: 1000 INJECTION INTRAVENOUS; SUBCUTANEOUS at 09:00

## 2019-02-04 RX ADMIN — CLOPIDOGREL BISULFATE 300 MG: 300 TABLET, FILM COATED ORAL at 09:42

## 2019-02-04 RX ADMIN — FENTANYL CITRATE 50 MCG: 50 INJECTION, SOLUTION INTRAMUSCULAR; INTRAVENOUS at 08:27

## 2019-02-04 RX ADMIN — SODIUM CHLORIDE: 4.5 INJECTION, SOLUTION INTRAVENOUS at 07:03

## 2019-02-04 RX ADMIN — MIDAZOLAM HYDROCHLORIDE 1 MG: 2 INJECTION, SOLUTION INTRAMUSCULAR; INTRAVENOUS at 08:27

## 2019-02-04 RX ADMIN — FENTANYL CITRATE 50 MCG: 50 INJECTION, SOLUTION INTRAMUSCULAR; INTRAVENOUS at 09:18

## 2019-02-04 RX ADMIN — FENTANYL CITRATE 25 MCG: 50 INJECTION, SOLUTION INTRAMUSCULAR; INTRAVENOUS at 08:54

## 2019-02-04 RX ADMIN — BACITRACIN ZINC 1 G: 500 OINTMENT TOPICAL at 09:34

## 2019-02-04 RX ADMIN — MIDAZOLAM HYDROCHLORIDE 0.5 MG: 2 INJECTION, SOLUTION INTRAMUSCULAR; INTRAVENOUS at 09:18

## 2019-02-04 RX ADMIN — IOPAMIDOL 180 ML: 612 INJECTION, SOLUTION INTRAVENOUS at 09:35

## 2019-02-04 RX ADMIN — MIDAZOLAM HYDROCHLORIDE 0.5 MG: 2 INJECTION, SOLUTION INTRAMUSCULAR; INTRAVENOUS at 08:54

## 2019-02-04 RX ADMIN — FENTANYL CITRATE 25 MCG: 50 INJECTION, SOLUTION INTRAMUSCULAR; INTRAVENOUS at 08:56

## 2019-02-04 RX ADMIN — CEFAZOLIN SODIUM 1 G: 1 INJECTION, SOLUTION INTRAVENOUS at 11:18

## 2019-02-04 ASSESSMENT — PAIN SCALES - GENERAL
PAINLEVEL_OUTOF10: 0

## 2019-02-27 NOTE — DISCHARGE SUMMARY
gradient across the aortic valve. 2.  Dominant RCA, tortous artery, about 60% to 70% narrowing proximally  and moderate disease of the mid posterolateral branch of the RCA of  about 70%.  Moderate disease, diffuse disease at the ostium, mid PDA of  the RCA. 3.  Patent left main. 4.  Nonobstructive disease, mid circumflex about 50%, also involved the  ostium of the first obtuse marginal.  5.  Nonobstructive disease of the LAD, about 40% to 50%.  Distally, the  LAD was patent. 6.  Subtotal stenosis of the first small diagonal artery at the ostium. 7.  Subtotal stenosis of the ostium, proximal portion of the second  diagonal artery. 8.  Nonobstructive disease at the ostium of the left renal artery with  severe stenosis at the ostium, proximal portion of the right renal  artery. 9.  Moderate narrowing at the ostium of the left common iliac artery at  the point of the bifurcation. 10.  Severe stenosis at the SFA of the right lower limb. 11.  Atherosclerotic change and calcification of the abdominal aorta.     RECOMMENDATIONS:  At this point, we recommend aggressive medical  treatment.  Pending his clinical course, he could have bypass  intervention of the LAD and diagonal artery.  Better to continue medical  treatment, the patient's aggressive control of his blood pressure.  He  could be considered for intervention of the ostium of the right renal  artery.  He could have further evaluation and possible intervention of  his peripheral vascular disease.  The patient has no acute complication  from the procedure. 1. Chest pain. Patient with recent cardiac cath with known moderate RCA disease with recommendation for medical management, on antiplatelet therapy, BB and statin. Initial troponin and EKG unremarkable for ischemia. Chest pain may be related to anxiety, as patient reports feeling anxious regarding pending procedure. Will obtain serial troponin.  Increase imdur from 60 daily to 120 daily, especially with better demonstrated on recent CTA study. There is moderate atherosclerotic regularity of the distal abdominal aorta. Both renal arteries, the SMA, and celiac artery are widely patent. PELVIC VESSELS: Right common iliac artery is ectatic proximally, 17 mm. Left common iliac artery is mildly ectatic proximally, 11 mm, but demonstrates a short segment 60-70% stenosis at its origin. Period there is also a short segment 60-70% stenosis in the distal end of the right common iliac artery and a 60-70% stenosis at the origin of the left external iliac artery. Angiogram LEFT leg: With the flush catheter in the lower abdominal aorta, a simultaneous runoff study of both legs was performed. CFA AND PROFUNDA: The common and deep femoral arteries are widely patent. SFA AND POPLITEAL ARTERY: There is mild narrowing of approximately 30% of the midportion of the left SFA and mild 30% stenosis in the proximal segment of the left popliteal artery. The remainder of the popliteal artery and SFA are unremarkable. TRIFURCATION VESSELS: All 3 trifurcation vessels are patent with the posterior tibial artery dominant. The anterior tibial artery is relatively diminutive in caliber. Angiogram RIGHT leg: CFA AND PROFUNDA: The common and deep femoral arteries are widely patent. SFA AND POPLITEAL ARTERY: The right SFA and popliteal artery are unremarkable. . TRIFURCATION VESSELS: All 3 trifurcation vessels are widely patent. INTERVENTION: At this point, it was decided to proceed with stenting of the iliac artery lesions mentioned above. The 5 Marshallese sheath in the right, femoral artery was upsized to a 6 [employer] sheath. Access was also obtained to the left common femoral artery with ultrasound guidance and a 7 Marshallese vascular sheath was inserted. Heparin, 2000, was administered. A self expanding 10 mm x 4 cm stent was inserted to treat the stenosis at the distal end of the right common iliac artery and postdilated with a  10 mm balloon.

## 2019-03-13 ENCOUNTER — HOSPITAL ENCOUNTER (OUTPATIENT)
Dept: ULTRASOUND IMAGING | Age: 69
Discharge: HOME | End: 2019-03-13
Payer: OTHER GOVERNMENT

## 2019-03-13 DIAGNOSIS — I71.40 ABDOMINAL AORTIC ANEURYSM (AAA) WITHOUT RUPTURE: ICD-10-CM

## 2019-03-13 PROCEDURE — 76775 US EXAM ABDO BACK WALL LIM: CPT

## 2019-03-18 ENCOUNTER — HOSPITAL ENCOUNTER (OUTPATIENT)
Dept: INTERVENTIONAL RADIOLOGY/VASCULAR | Age: 69
Discharge: HOME | End: 2019-03-18
Payer: OTHER GOVERNMENT

## 2019-03-18 ENCOUNTER — TELEPHONE (OUTPATIENT)
Dept: CARDIOTHORACIC SURGERY | Age: 69
End: 2019-03-18

## 2019-03-18 DIAGNOSIS — I70.0 AORTOILIAC STENOSIS, LEFT (HCC): Primary | ICD-10-CM

## 2019-03-18 DIAGNOSIS — I70.0 AORTOILIAC STENOSIS, LEFT (HCC): ICD-10-CM

## 2019-03-18 PROCEDURE — 93922 UPR/L XTREMITY ART 2 LEVELS: CPT

## 2019-03-25 ENCOUNTER — OFFICE VISIT (OUTPATIENT)
Dept: CARDIOTHORACIC SURGERY | Age: 69
End: 2019-03-25

## 2019-03-25 VITALS
WEIGHT: 214.4 LBS | HEART RATE: 58 BPM | SYSTOLIC BLOOD PRESSURE: 123 MMHG | BODY MASS INDEX: 30.69 KG/M2 | DIASTOLIC BLOOD PRESSURE: 73 MMHG | HEIGHT: 70 IN

## 2019-03-25 DIAGNOSIS — I73.9 PVD (PERIPHERAL VASCULAR DISEASE) WITH CLAUDICATION (HCC): Primary | ICD-10-CM

## 2019-03-25 PROCEDURE — 99024 POSTOP FOLLOW-UP VISIT: CPT | Performed by: THORACIC SURGERY (CARDIOTHORACIC VASCULAR SURGERY)

## 2019-06-09 NOTE — CARE COORDINATION
Questioned patient on 11/7 during interview re: [facility]. Patient declined and wants to be treated here. [facility] - left message with Gil Childress at ext . Patient states all meds are from [place] at 100%. According to chart: primary payor Medicare, secondary [facility]. Yes

## 2019-09-20 ENCOUNTER — TELEPHONE (OUTPATIENT)
Dept: CARDIOTHORACIC SURGERY | Age: 69
End: 2019-09-20

## 2019-10-02 ENCOUNTER — HOSPITAL ENCOUNTER (OUTPATIENT)
Dept: INTERVENTIONAL RADIOLOGY/VASCULAR | Age: 69
Discharge: HOME | End: 2019-10-02
Payer: OTHER GOVERNMENT

## 2019-10-02 DIAGNOSIS — I73.9 PVD (PERIPHERAL VASCULAR DISEASE) (HCC): ICD-10-CM

## 2019-10-02 PROCEDURE — 93922 UPR/L XTREMITY ART 2 LEVELS: CPT

## 2019-10-09 ENCOUNTER — OFFICE VISIT (OUTPATIENT)
Dept: CARDIOTHORACIC SURGERY | Age: 69
End: 2019-10-09
Payer: OTHER GOVERNMENT

## 2019-10-09 VITALS
OXYGEN SATURATION: 97 % | BODY MASS INDEX: 29.68 KG/M2 | WEIGHT: 212 LBS | HEART RATE: 59 BPM | RESPIRATION RATE: 14 BRPM | DIASTOLIC BLOOD PRESSURE: 83 MMHG | HEIGHT: 71 IN | SYSTOLIC BLOOD PRESSURE: 173 MMHG

## 2019-10-09 DIAGNOSIS — Z98.890 H/O CAROTID ENDARTERECTOMY: Primary | ICD-10-CM

## 2019-10-09 PROCEDURE — 99214 OFFICE O/P EST MOD 30 MIN: CPT | Performed by: PHYSICIAN ASSISTANT

## 2019-10-31 DIAGNOSIS — I10 ESSENTIAL HYPERTENSION: ICD-10-CM

## 2019-10-31 DIAGNOSIS — I79.8 OTHER DISORDERS OF ARTERIES, ARTERIOLES AND CAPILLARIES IN DISEASES CLASSIFIED ELSEWHERE (HCC): ICD-10-CM

## 2019-10-31 DIAGNOSIS — Z98.890 H/O CAROTID ENDARTERECTOMY: Primary | ICD-10-CM

## 2019-11-13 ENCOUNTER — HOSPITAL ENCOUNTER (OUTPATIENT)
Dept: INTERVENTIONAL RADIOLOGY/VASCULAR | Age: 69
Discharge: HOME | End: 2019-11-13
Payer: OTHER GOVERNMENT

## 2019-11-13 DIAGNOSIS — I10 ESSENTIAL HYPERTENSION: ICD-10-CM

## 2019-11-13 DIAGNOSIS — Z98.890 H/O CAROTID ENDARTERECTOMY: ICD-10-CM

## 2019-11-13 DIAGNOSIS — I79.8 OTHER DISORDERS OF ARTERIES, ARTERIOLES AND CAPILLARIES IN DISEASES CLASSIFIED ELSEWHERE (HCC): ICD-10-CM

## 2019-11-13 PROCEDURE — 93880 EXTRACRANIAL BILAT STUDY: CPT

## 2019-11-18 ENCOUNTER — OFFICE VISIT (OUTPATIENT)
Dept: CARDIOTHORACIC SURGERY | Age: 69
End: 2019-11-18

## 2019-11-18 VITALS
HEIGHT: 70 IN | HEART RATE: 58 BPM | SYSTOLIC BLOOD PRESSURE: 137 MMHG | BODY MASS INDEX: 30.06 KG/M2 | WEIGHT: 210 LBS | DIASTOLIC BLOOD PRESSURE: 75 MMHG

## 2019-11-18 DIAGNOSIS — Z98.890 HISTORY OF LEFT-SIDED CAROTID ENDARTERECTOMY: ICD-10-CM

## 2019-11-18 DIAGNOSIS — I65.22 CAROTID STENOSIS, LEFT: ICD-10-CM

## 2019-11-18 DIAGNOSIS — I79.8 OTHER DISORDERS OF ARTERIES, ARTERIOLES AND CAPILLARIES IN DISEASES CLASSIFIED ELSEWHERE (HCC): Primary | ICD-10-CM

## 2019-11-18 PROCEDURE — APPSS30 APP SPLIT SHARED TIME 16-30 MINUTES: Performed by: PHYSICIAN ASSISTANT

## 2019-11-18 PROCEDURE — 99024 POSTOP FOLLOW-UP VISIT: CPT | Performed by: PHYSICIAN ASSISTANT

## 2020-11-12 ENCOUNTER — TELEPHONE (OUTPATIENT)
Dept: CARDIOTHORACIC SURGERY | Age: 70
End: 2020-11-12

## 2020-11-12 NOTE — TELEPHONE ENCOUNTER
Jorja Babinski did not complete carotid duplex scheduled on 11/11/20. Call out to patient to r/s, but was unable to contact him. Phone kept ringing with no answer and unable to LM.

## 2021-05-20 ENCOUNTER — APPOINTMENT (OUTPATIENT)
Dept: CT IMAGING | Age: 71
End: 2021-05-20
Payer: OTHER GOVERNMENT

## 2021-05-20 ENCOUNTER — HOSPITAL ENCOUNTER (EMERGENCY)
Age: 71
Discharge: HOME | End: 2021-05-20
Payer: OTHER GOVERNMENT

## 2021-05-20 VITALS
BODY MASS INDEX: 30.13 KG/M2 | HEART RATE: 59 BPM | WEIGHT: 210 LBS | DIASTOLIC BLOOD PRESSURE: 63 MMHG | OXYGEN SATURATION: 98 % | RESPIRATION RATE: 18 BRPM | SYSTOLIC BLOOD PRESSURE: 139 MMHG | TEMPERATURE: 98.2 F
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
## 2021-05-20 DIAGNOSIS — M25.552 ARTHRALGIA OF LEFT HIP: ICD-10-CM

## 2021-05-20 DIAGNOSIS — S39.012A STRAIN OF LUMBAR REGION, INITIAL ENCOUNTER: ICD-10-CM

## 2021-05-20 PROCEDURE — 72125 CT NECK SPINE W/O DYE: CPT

## 2021-05-20 PROCEDURE — 6370000000 HC RX 637 (ALT 250 FOR IP): Performed by: NURSE PRACTITIONER

## 2021-05-20 PROCEDURE — 72131 CT LUMBAR SPINE W/O DYE: CPT

## 2021-05-20 PROCEDURE — 70450 CT HEAD/BRAIN W/O DYE: CPT

## 2021-05-20 PROCEDURE — 99284 EMERGENCY DEPT VISIT MOD MDM: CPT

## 2021-05-20 PROCEDURE — 72192 CT PELVIS W/O DYE: CPT

## 2021-05-20 RX ORDER — HYDROCODONE BITARTRATE AND ACETAMINOPHEN 5; 325 MG/1; MG/1
1 TABLET ORAL EVERY 6 HOURS PRN
Qty: 10 TABLET | Refills: 0 | Status: SHIPPED | OUTPATIENT
Start: 2021-05-20 | End: 2021-05-23

## 2021-05-20 RX ORDER — HYDROCODONE BITARTRATE AND ACETAMINOPHEN 5; 325 MG/1; MG/1
1 TABLET ORAL ONCE
Status: COMPLETED | OUTPATIENT
Start: 2021-05-20 | End: 2021-05-20

## 2021-05-20 RX ORDER — TIZANIDINE 4 MG/1
4 TABLET ORAL ONCE
Status: COMPLETED | OUTPATIENT
Start: 2021-05-20 | End: 2021-05-20

## 2021-05-20 RX ORDER — CILOSTAZOL 100 MG/1
100 TABLET ORAL 2 TIMES DAILY
COMMUNITY

## 2021-05-20 RX ORDER — TIZANIDINE 4 MG/1
4 TABLET ORAL EVERY 6 HOURS PRN
Qty: 20 TABLET | Refills: 0 | Status: ON HOLD | OUTPATIENT
Start: 2021-05-20 | End: 2022-04-06 | Stop reason: ALTCHOICE

## 2021-05-20 RX ADMIN — TIZANIDINE 4 MG: 4 TABLET ORAL at 13:37

## 2021-05-20 RX ADMIN — HYDROCODONE BITARTRATE AND ACETAMINOPHEN 1 TABLET: 5; 325 TABLET ORAL at 13:37

## 2021-05-20 ASSESSMENT — ENCOUNTER SYMPTOMS
CHEST TIGHTNESS: 0
SHORTNESS OF BREATH: 0
COLOR CHANGE: 0
ABDOMINAL PAIN: 0
COUGH: 0
RHINORRHEA: 0
ABDOMINAL DISTENTION: 0
BACK PAIN: 1

## 2021-05-20 ASSESSMENT — PAIN DESCRIPTION - PAIN TYPE
TYPE: ACUTE PAIN
TYPE: ACUTE PAIN

## 2021-05-20 ASSESSMENT — PAIN DESCRIPTION - LOCATION
LOCATION: BACK;SHOULDER
LOCATION: BACK;SHOULDER

## 2021-05-20 ASSESSMENT — PAIN DESCRIPTION - DESCRIPTORS: DESCRIPTORS: ACHING

## 2021-05-20 ASSESSMENT — PAIN SCALES - GENERAL
PAINLEVEL_OUTOF10: 5
PAINLEVEL_OUTOF10: 8

## 2021-05-20 ASSESSMENT — PAIN DESCRIPTION - ORIENTATION: ORIENTATION: RIGHT;LEFT;LOWER

## 2021-05-20 ASSESSMENT — PAIN DESCRIPTION - PROGRESSION: CLINICAL_PROGRESSION: NOT CHANGED

## 2021-05-20 ASSESSMENT — PAIN DESCRIPTION - FREQUENCY: FREQUENCY: INTERMITTENT

## 2021-05-20 NOTE — ED NOTES
Patient to the ED with complaints of a fall. Patient states that roughly 10 hours ago he got up out of his recliner in which he sleeps and states that he tripped and fell onto his back. He states that the lights were off and states he did not see where he was walking. He states that he is on aspirin and prescription blood thinner. He denies any other complaints. Patient is resting in bed with easy and unlabored respirations. Call light in reach. Side rails up x2. Patient denies further complaints or concerns. Will monitor.         Kimberley Mota RN  05/20/21 3230

## 2021-05-20 NOTE — ED PROVIDER NOTES
[facility] Emergency Department    CHIEF COMPLAINT       Chief Complaint   Patient presents with    Fall       Nurses Notes reviewed and I agree except as noted in the HPI. HISTORY OF PRESENT ILLNESS    Anders Reese is a 66 y.o. male who presents to the ED for evaluation of fall. Patient notes that he fell at 3 AM, tripped over the end of the bed, fell directly on his back. He notes a history of chronic hip pain. He notes its worse. He notes he is having trouble with activities of daily living. His occupational therapist came and evaluated him today and felt he needed to come in for imaging. Patient notes increased pain in his left hip and lower back. He believes he hit his head but he does not think he lost consciousness, denies any nausea since then. Wife at bedside notes memory problem that this has been ongoing for the last several weeks. Patient has past medical history of peripheral artery disease, currently on Pletal.  History of coronary artery disease COPD GERD diabetes. HPI was provided by the patient. REVIEW OF SYSTEMS     Review of Systems   Constitutional: Negative for activity change, chills and fever. HENT: Negative for congestion and rhinorrhea. Respiratory: Negative for cough, chest tightness and shortness of breath. Cardiovascular: Negative for chest pain. Gastrointestinal: Negative for abdominal distention and abdominal pain. Genitourinary: Negative for decreased urine volume, difficulty urinating and dysuria. Musculoskeletal: Positive for arthralgias, back pain, gait problem and myalgias. Negative for joint swelling, neck pain and neck stiffness. Skin: Negative for color change and rash. Allergic/Immunologic: Negative for immunocompromised state. Neurological: Negative for dizziness, weakness, light-headedness and headaches. Hematological: Does not bruise/bleed easily.    Psychiatric/Behavioral: Negative for agitation, behavioral problems and confusion. PAST MEDICAL HISTORY     Past Medical History:   Diagnosis Date    Arthritis     Asthma     Blood circulation, collateral     bilateral LE    CAD (coronary artery disease)     COPD (chronic obstructive pulmonary disease) ([facility] [address]     Diabetes mellitus (HCC)     GERD (gastroesophageal reflux disease)     Hyperlipidemia     Hypertension     Other disorders of kidney and ureter in diseases classified elsewhere     Psychiatric problem        SURGICALHISTORY      has a past surgical history that includes Tonsillectomy; Colonoscopy; eye surgery; Cardiac catheterization; and Carotid endarterectomy (Left, 12/26/2018).     CURRENT MEDICATIONS       Discharge Medication List as of 5/20/2021  3:36 PM      CONTINUE these medications which have NOT CHANGED    Details   cilostazol (PLETAL) 100 MG tablet Take 100 mg by mouth 2 times dailyHistorical Med      metoprolol tartrate (LOPRESSOR) 25 MG tablet Take 25 mg by mouth 2 times dailyHistorical Med      acetaminophen (TYLENOL) 325 MG tablet Take 2 tablets by mouth every 4 hours as needed for Pain, Disp-120 tablet, R-3Normal      spironolactone (ALDACTONE) 50 MG tablet Take 1 tablet by mouth daily, Disp-30 tablet, R-3Normal      isosorbide mononitrate (IMDUR) 120 MG extended release tablet Take 1 tablet by mouth daily, Disp-30 tablet, R-0Print      atorvastatin (LIPITOR) 80 MG tablet Take 1 tablet by mouth daily, Disp-30 tablet, R-0Print      metFORMIN (GLUCOPHAGE XR) 500 MG extended release tablet Take 1 tablet by mouth Daily with supper, Disp-30 tablet, R-0Print      pantoprazole (PROTONIX) 40 MG tablet Take 1 tablet by mouth every morning (before breakfast), Disp-30 tablet, R-11Print      albuterol sulfate  (90 Base) MCG/ACT inhaler Inhale 2 puffs into the lungs every 4 hours as needed for Wheezing or Shortness of BreathHistorical Med      Tiotropium Bromide-Olodaterol (STIOLTO RESPIMAT) 2.5-2.5 MCG/ACT AERS Inhale 2 puffs into the lungs dailyHistorical Med      Omega 3-6-9 Fatty Acids (GNP TRIPLE OMEGA COMPLEX PO) Take 2 capsules by mouth 2 times dailyHistorical Med      hydrochlorothiazide (HYDRODIURIL) 25 MG tablet Take 25 mg by mouth dailyHistorical Med      nitroGLYCERIN (NITROSTAT) 0.4 MG SL tablet Place 1 tablet under the tongue every 5 minutes as needed for Chest pain, Disp-25 tablet, R-3      amLODIPine (NORVASC) 10 MG tablet Take 1 tablet by mouth daily, Disp-30 tablet, R-3      aspirin 81 MG EC tablet Take 81 mg by mouth daily             ALLERGIES     has No Known Allergies. FAMILY HISTORY     He indicated that his mother is . He indicated that his father is . He indicated that only one of his two sisters is alive. He indicated that his brother is . family history includes Asthma in his father; Cancer in his mother; Diabetes in his mother and sister; Heart Disease in his brother and sister. SOCIAL HISTORY       Social History     Socioeconomic History    Marital status:      Spouse name: Bernie Moya Number of children: 11    Years of education: Not on file    Highest education level: Not on file   Occupational History    Not on file   Tobacco Use    Smoking status: Former Smoker     Packs/day: 0.75     Years: 20.00     Pack years: 15.00     Quit date: 2015     Years since quittin.2    Smokeless tobacco: Never Used   Vaping Use    Vaping Use: Never used   Substance and Sexual Activity    Alcohol use: No    Drug use: No    Sexual activity: Not on file   Other Topics Concern    Not on file   Social History Narrative    Not on file     Social Determinants of Health     Financial Resource Strain:     Difficulty of Paying Living Expenses:    Food Insecurity:     Worried About [address] in the Last Year:     [address]  in the Last Year:    Transportation Needs:     Lack of Transportation (Medical):      Lack of Transportation (Non-Medical):    Physical Activity:     Days of Exercise per Week:     Minutes of Exercise per Session:    Stress:     Feeling of Stress :    Social Connections:     Frequency of Communication with Friends and Family:     Frequency of Social Gatherings with Friends and Family:     Attends [employer] Services:     Active Member of Clubs or Organizations:     Attends Club or Organization Meetings:     Marital Status:    Intimate Partner Violence:     Fear of Current or Ex-Partner:     Emotionally Abused:     Physically Abused:     Sexually Abused:        PHYSICAL EXAM     INITIAL VITALS:  weight is 210 lb (95.3 kg). His oral temperature is 98.2 °F (36.8 °C). His blood pressure is 139/63 and his pulse is 59. His respiration is 18 and oxygen saturation is 98%. Physical Exam  Vitals and nursing note reviewed. Constitutional:       Appearance: Normal appearance. He is well-developed. HENT:      Head: Normocephalic. Right Ear: External ear normal.      Left Ear: External ear normal.      Nose: Nose normal.      Mouth/Throat:      Pharynx: Uvula midline. Eyes:      Conjunctiva/sclera: Conjunctivae normal.   Cardiovascular:      Rate and Rhythm: Normal rate and regular rhythm. Heart sounds: Normal heart sounds, S1 normal and S2 normal.   Pulmonary:      Effort: Pulmonary effort is normal. No respiratory distress. Breath sounds: Normal breath sounds. Chest:      Chest wall: No tenderness. Abdominal:      General: Bowel sounds are normal. There is no distension. Palpations: Abdomen is soft. Tenderness: There is no abdominal tenderness. Musculoskeletal:      Cervical back: Normal range of motion and neck supple. Tenderness and bony tenderness present. No swelling. Lumbar back: Tenderness and bony tenderness present. No swelling, edema or deformity. Decreased range of motion. Skin:     General: Skin is warm and dry. Coloration: Skin is not pale. Findings: No erythema or rash.    Neurological:      Mental Status: He is alert and oriented to person, place, and time. Psychiatric:         Behavior: Behavior normal.         Thought Content: Thought content normal.         Judgment: Judgment normal.         DIFFERENTIAL DIAGNOSIS:   Sprain strain fracture arthritis  DIAGNOSTIC RESULTS        RADIOLOGY: non-plainfilm images(s) such as CT, Ultrasound and MRI are read by the radiologist.  Plain radiographic images are visualized and preliminarily interpreted by the emergency physician unless otherwise stated below. CT LUMBAR SPINE WO CONTRAST   Final Result   Multilevel degenerative changes with no fractures. **This report has been created using voice recognition software. It may contain minor errors which are inherent in voice recognition technology. **      Final report electronically signed by Dr Shira Pino on 5/20/2021 2:58 PM      CT PELVIS WO CONTRAST Additional Contrast? None   Final Result   1. Severe osteoarthritis left hip with bone-on-bone articulation and subchondral cystic changes. No displaced fractures are identified. **This report has been created using voice recognition software. It may contain minor errors which are inherent in voice recognition technology. **      Final report electronically signed by Dr Horacio Goldstein on 5/20/2021 3:14 PM      CT HEAD WO CONTRAST   Final Result   No acute intracranial hemorrhage, mass effect or midline shift. **This report has been created using voice recognition software. It may contain minor errors which are inherent in voice recognition technology. **      Final report electronically signed by Dr Shira Pino on 5/20/2021 2:35 PM      CT CERVICAL SPINE WO CONTRAST   Final Result   Mild degenerative changes with no acute fracture or subluxation throughout the cervical spine. **This report has been created using voice recognition software.  It may contain minor errors which are inherent in voice recognition technology. **      Final report electronically signed by Dr Juan Toribio on 5/20/2021 2:39 PM            LABS:   Labs Reviewed - No data to display    EMERGENCY DEPARTMENT COURSE:   Vitals:    Vitals:    05/20/21 1246 05/20/21 1519   BP: (!) 154/68 139/63   Pulse: 65 59   Resp: 18    Temp: 98.2 °F (36.8 °C)    TempSrc: Oral    SpO2: 98% 98%   Weight: 210 lb (95.3 kg)      MDM    Patient was seen and evaluated in the emergency department, patient appeared to be in no acute distress, vital signs were reviewed, no significant findings noted. Physical exam was completed, he had arthralgia bilateral hips, he had decreased range of motion noted, he had lumbar and cervical spine tenderness and cranial nerves are grossly intact. CT of the head neck pelvis and lumbar spine were obtained and negative for acute normality. He has significant arthralgias of the left hip. I discussed my findings my plan of care the patient is amenable discharge. While here in the emergency department maintained stable course appropriate for discharge. He is advised [facility] [place] [facility] hip. Follow-up with family doctor for continued care of chronic pain. Medications   HYDROcodone-acetaminophen (NORCO) 5-325 MG per tablet 1 tablet (1 tablet Oral Given 5/20/21 1337)   tiZANidine (ZANAFLEX) tablet 4 mg (4 mg Oral Given 5/20/21 1337)       Patient was seenindependently by myself. The patient's final impression and disposition and plan was determined by myself. CRITICAL CARE:   None    CONSULTS:  None    PROCEDURES:  None    FINAL IMPRESSION     1. Fall on same level from slipping, tripping or stumbling, initial encounter    2. Strain of lumbar region, initial encounter    3.  Arthralgia of left hip          DISPOSITION/PLAN   Patient discharged in stable condition    PATIENT REFERREDTO:  Marc Odonnell MD  [facility] [address] [phone]    Call   For follow up and

## 2021-05-20 NOTE — ED NOTES
Patient is resting in bed with easy and unlabored respirations. Call light in reach. Side rails up x2. Patient denies further complaints or concerns. Will monitor.         Martin Hansen RN  05/20/21 6670

## 2021-09-16 RX ORDER — BUPIVACAINE HYDROCHLORIDE 2.5 MG/ML
5 INJECTION, SOLUTION EPIDURAL; INFILTRATION; INTRACAUDAL ONCE
Status: CANCELLED | OUTPATIENT
Start: 2021-09-16 | End: 2021-09-16

## 2021-09-16 RX ORDER — METHYLPREDNISOLONE ACETATE 80 MG/ML
80 INJECTION, SUSPENSION INTRA-ARTICULAR; INTRALESIONAL; INTRAMUSCULAR; SOFT TISSUE ONCE
Status: CANCELLED | OUTPATIENT
Start: 2021-09-16 | End: 2021-09-16

## 2021-09-17 ENCOUNTER — HOSPITAL ENCOUNTER (OUTPATIENT)
Dept: INTERVENTIONAL RADIOLOGY/VASCULAR | Age: 71
Discharge: HOME | End: 2021-09-17
Payer: OTHER GOVERNMENT

## 2021-09-17 DIAGNOSIS — R52 PAIN: ICD-10-CM

## 2021-09-17 PROCEDURE — 77002 NEEDLE LOCALIZATION BY XRAY: CPT

## 2021-09-17 PROCEDURE — 6360000002 HC RX W HCPCS: Performed by: RADIOLOGY

## 2021-09-17 PROCEDURE — 6360000004 HC RX CONTRAST MEDICATION: Performed by: RADIOLOGY

## 2021-09-17 PROCEDURE — 2709999900 IR FLUORO GUIDED NEEDLE PLACEMENT

## 2021-09-17 PROCEDURE — 20610 DRAIN/INJ JOINT/BURSA W/O US: CPT

## 2021-09-17 PROCEDURE — 2500000003 HC RX 250 WO HCPCS: Performed by: RADIOLOGY

## 2021-09-17 RX ORDER — BUPIVACAINE HYDROCHLORIDE 2.5 MG/ML
5 INJECTION, SOLUTION EPIDURAL; INFILTRATION; INTRACAUDAL ONCE
Status: COMPLETED | OUTPATIENT
Start: 2021-09-17 | End: 2021-09-17

## 2021-09-17 RX ORDER — TRIAMCINOLONE ACETONIDE 40 MG/ML
40 INJECTION, SUSPENSION INTRA-ARTICULAR; INTRAMUSCULAR ONCE
Status: COMPLETED | OUTPATIENT
Start: 2021-09-17 | End: 2021-09-17

## 2021-09-17 RX ORDER — METHYLPREDNISOLONE ACETATE 80 MG/ML
80 INJECTION, SUSPENSION INTRA-ARTICULAR; INTRALESIONAL; INTRAMUSCULAR; SOFT TISSUE ONCE
Status: DISCONTINUED | OUTPATIENT
Start: 2021-09-17 | End: 2021-09-17

## 2021-09-17 RX ADMIN — IOTHALAMATE MEGLUMINE 1 ML: 600 INJECTION INTRAVASCULAR at 14:40

## 2021-09-17 RX ADMIN — BUPIVACAINE HYDROCHLORIDE 2 ML: 2.5 INJECTION, SOLUTION EPIDURAL; INFILTRATION; INTRACAUDAL; PERINEURAL at 14:40

## 2021-09-17 RX ADMIN — TRIAMCINOLONE ACETONIDE 40 MG: 40 INJECTION, SUSPENSION INTRA-ARTICULAR; INTRAMUSCULAR at 14:41

## 2021-09-17 ASSESSMENT — PAIN SCALES - GENERAL: PAINLEVEL_OUTOF10: 0

## 2022-01-20 ENCOUNTER — HOSPITAL ENCOUNTER (OUTPATIENT)
Dept: INTERVENTIONAL RADIOLOGY/VASCULAR | Age: 72
Discharge: HOME | End: 2022-01-20
Payer: OTHER GOVERNMENT

## 2022-01-20 DIAGNOSIS — M16.11 OSTEOARTHRITIS OF RIGHT HIP, UNSPECIFIED OSTEOARTHRITIS TYPE: ICD-10-CM

## 2022-01-20 PROCEDURE — 77002 NEEDLE LOCALIZATION BY XRAY: CPT

## 2022-01-20 PROCEDURE — 6360000004 HC RX CONTRAST MEDICATION: Performed by: RADIOLOGY

## 2022-01-20 PROCEDURE — 2709999900 IR INJ ARTHROGRAM HIP RIGHT

## 2022-01-20 PROCEDURE — 20610 DRAIN/INJ JOINT/BURSA W/O US: CPT

## 2022-01-20 PROCEDURE — 6360000002 HC RX W HCPCS: Performed by: RADIOLOGY

## 2022-01-20 PROCEDURE — 2500000003 HC RX 250 WO HCPCS: Performed by: RADIOLOGY

## 2022-01-20 RX ORDER — TRIAMCINOLONE ACETONIDE 40 MG/ML
40 INJECTION, SUSPENSION INTRA-ARTICULAR; INTRAMUSCULAR ONCE
Status: COMPLETED | OUTPATIENT
Start: 2022-01-20 | End: 2022-01-20

## 2022-01-20 RX ORDER — BUPIVACAINE HYDROCHLORIDE 2.5 MG/ML
4 INJECTION, SOLUTION EPIDURAL; INFILTRATION; INTRACAUDAL ONCE
Status: COMPLETED | OUTPATIENT
Start: 2022-01-20 | End: 2022-01-20

## 2022-01-20 RX ADMIN — BUPIVACAINE HYDROCHLORIDE 2 ML: 2.5 INJECTION, SOLUTION EPIDURAL; INFILTRATION; INTRACAUDAL; PERINEURAL at 14:44

## 2022-01-20 RX ADMIN — TRIAMCINOLONE ACETONIDE 40 MG: 40 INJECTION, SUSPENSION INTRA-ARTICULAR; INTRAMUSCULAR at 14:44

## 2022-01-20 RX ADMIN — IOTHALAMATE MEGLUMINE 1 ML: 600 INJECTION INTRAVASCULAR at 14:44

## 2022-01-20 ASSESSMENT — PAIN SCALES - GENERAL: PAINLEVEL_OUTOF10: 4

## 2022-01-20 NOTE — OP NOTE
Department of Radiology  Post Procedure Progress Note      Pre-Procedure Diagnosis:  Right hip pain. Procedure Performed:  Right hip block    Anesthesia: local    Findings: successful    Immediate Complications:  None    Estimated Blood Loss: minimal    SEE DICTATED PROCEDURE NOTE FOR COMPLETE DETAILS.     Electronically signed by Elham Barrett MD on 1/20/2022 at 2:46 PM

## 2022-01-20 NOTE — H&P
Formulation and discussion of sedation / procedure plans, risks, benefits, side effects and alternatives with patient and/or responsible adult completed.     Electronically signed by Elsi Layton MD on 1/20/2022 at 2:46 PM

## 2022-01-20 NOTE — PROGRESS NOTES
46 Pt in specials radiology for right hip injection. Explained procedure to pt and pt verbalizes understanding. Consent signed. 4875 Pt positioned supine on table. 26 Dr Yoana Mcrae to speak to pt.  5655 Right hip injection complete. Pt tolerated well. 2688 Pt discharged ambulatory with steady gait. Pt offers no complaints.

## 2022-04-06 ENCOUNTER — HOSPITAL ENCOUNTER (INPATIENT)
Age: 72
LOS: 1 days | Discharge: HOME HEALTH | DRG: 310 | End: 2022-04-07
Attending: STUDENT IN AN ORGANIZED HEALTH CARE EDUCATION/TRAINING PROGRAM | Admitting: STUDENT IN AN ORGANIZED HEALTH CARE EDUCATION/TRAINING PROGRAM
Payer: OTHER GOVERNMENT

## 2022-04-06 DIAGNOSIS — K52.9 GASTROENTERITIS: ICD-10-CM

## 2022-04-06 DIAGNOSIS — R00.1 SYMPTOMATIC BRADYCARDIA: Primary | ICD-10-CM

## 2022-04-06 LAB
ALBUMIN SERPL-MCNC: 3.6 G/DL (ref 3.5–5.1)
ALP BLD-CCNC: 115 U/L (ref 38–126)
ALT SERPL-CCNC: 12 U/L (ref 11–66)
ANION GAP SERPL CALCULATED.3IONS-SCNC: 11 MEQ/L (ref 8–16)
AST SERPL-CCNC: 17 U/L (ref 5–40)
BACTERIA: ABNORMAL /HPF
BASOPHILS # BLD: 0.3 %
BASOPHILS ABSOLUTE: 0 THOU/MM3 (ref 0–0.1)
BILIRUB SERPL-MCNC: 0.4 MG/DL (ref 0.3–1.2)
BILIRUBIN URINE: NEGATIVE
BLOOD, URINE: ABNORMAL
BUN BLDV-MCNC: 14 MG/DL (ref 7–22)
CALCIUM SERPL-MCNC: 8.5 MG/DL (ref 8.5–10.5)
CASTS 2: ABNORMAL /LPF
CASTS UA: ABNORMAL /LPF
CHARACTER, URINE: ABNORMAL
CHLORIDE BLD-SCNC: 106 MEQ/L (ref 98–111)
CO2: 22 MEQ/L (ref 23–33)
COLOR: YELLOW
CREAT SERPL-MCNC: 0.8 MG/DL (ref 0.4–1.2)
CRYSTALS, UA: ABNORMAL
EKG ATRIAL RATE: 54 BPM
EKG ATRIAL RATE: 66 BPM
EKG P AXIS: 31 DEGREES
EKG P AXIS: 36 DEGREES
EKG P-R INTERVAL: 216 MS
EKG P-R INTERVAL: 216 MS
EKG Q-T INTERVAL: 426 MS
EKG Q-T INTERVAL: 474 MS
EKG QRS DURATION: 108 MS
EKG QRS DURATION: 118 MS
EKG QTC CALCULATION (BAZETT): 446 MS
EKG QTC CALCULATION (BAZETT): 449 MS
EKG R AXIS: -35 DEGREES
EKG R AXIS: -35 DEGREES
EKG T AXIS: -85 DEGREES
EKG T AXIS: 76 DEGREES
EKG VENTRICULAR RATE: 54 BPM
EKG VENTRICULAR RATE: 66 BPM
EOSINOPHIL # BLD: 1.6 %
EOSINOPHILS ABSOLUTE: 0.2 THOU/MM3 (ref 0–0.4)
EPITHELIAL CELLS, UA: ABNORMAL /HPF
ERYTHROCYTE [DISTWIDTH] IN BLOOD BY AUTOMATED COUNT: 12.2 % (ref 11.5–14.5)
ERYTHROCYTE [DISTWIDTH] IN BLOOD BY AUTOMATED COUNT: 40.8 FL (ref 35–45)
GFR SERPL CREATININE-BSD FRML MDRD: > 90 ML/MIN/1.73M2
GLUCOSE BLD-MCNC: 143 MG/DL (ref 70–108)
GLUCOSE BLD-MCNC: 265 MG/DL (ref 70–108)
GLUCOSE BLD-MCNC: 269 MG/DL (ref 70–108)
GLUCOSE BLD-MCNC: 72 MG/DL (ref 70–108)
GLUCOSE URINE: NEGATIVE MG/DL
HCT VFR BLD CALC: 41.4 % (ref 42–52)
HEMOGLOBIN: 13.1 GM/DL (ref 14–18)
IMMATURE GRANS (ABS): 0.04 THOU/MM3 (ref 0–0.07)
IMMATURE GRANULOCYTES: 0.4 %
KETONES, URINE: ABNORMAL
LACTIC ACID, SEPSIS: 1.2 MMOL/L (ref 0.5–1.9)
LEUKOCYTE ESTERASE, URINE: ABNORMAL
LIPASE: 32.9 U/L (ref 5.6–51.3)
LV EF: 60 %
LVEF MODALITY: NORMAL
LYMPHOCYTES # BLD: 16 %
LYMPHOCYTES ABSOLUTE: 1.5 THOU/MM3 (ref 1–4.8)
MAGNESIUM: 1.9 MG/DL (ref 1.6–2.4)
MCH RBC QN AUTO: 29.2 PG (ref 26–33)
MCHC RBC AUTO-ENTMCNC: 31.6 GM/DL (ref 32.2–35.5)
MCV RBC AUTO: 92.2 FL (ref 80–94)
MISCELLANEOUS 2: ABNORMAL
MONOCYTES # BLD: 5.1 %
MONOCYTES ABSOLUTE: 0.5 THOU/MM3 (ref 0.4–1.3)
NITRITE, URINE: NEGATIVE
NUCLEATED RED BLOOD CELLS: 0 /100 WBC
OSMOLALITY CALCULATION: 287.3 MOSMOL/KG (ref 275–300)
PH UA: 5 (ref 5–9)
PLATELET # BLD: 171 THOU/MM3 (ref 130–400)
PMV BLD AUTO: 12.8 FL (ref 9.4–12.4)
POTASSIUM REFLEX MAGNESIUM: 3.8 MEQ/L (ref 3.5–5.2)
PRO-BNP: 147.2 PG/ML (ref 0–900)
PROTEIN UA: ABNORMAL
RBC # BLD: 4.49 MILL/MM3 (ref 4.7–6.1)
RBC URINE: ABNORMAL /HPF
REASON FOR REJECTION: NORMAL
REJECTED TEST: NORMAL
RENAL EPITHELIAL, UA: ABNORMAL
SEG NEUTROPHILS: 76.6 %
SEGMENTED NEUTROPHILS ABSOLUTE COUNT: 7.4 THOU/MM3 (ref 1.8–7.7)
SODIUM BLD-SCNC: 139 MEQ/L (ref 135–145)
SPECIFIC GRAVITY, URINE: 1.02 (ref 1–1.03)
TOTAL PROTEIN: 6 G/DL (ref 6.1–8)
TROPONIN T: < 0.01 NG/ML
TROPONIN T: < 0.01 NG/ML
TSH SERPL DL<=0.05 MIU/L-ACNC: 0.59 UIU/ML (ref 0.4–4.2)
UROBILINOGEN, URINE: 1 EU/DL (ref 0–1)
WBC # BLD: 9.6 THOU/MM3 (ref 4.8–10.8)
WBC UA: > 100 /HPF
YEAST: ABNORMAL

## 2022-04-06 PROCEDURE — 93010 ELECTROCARDIOGRAM REPORT: CPT | Performed by: INTERNAL MEDICINE

## 2022-04-06 PROCEDURE — 93005 ELECTROCARDIOGRAM TRACING: CPT | Performed by: PHYSICIAN ASSISTANT

## 2022-04-06 PROCEDURE — 87186 SC STD MICRODIL/AGAR DIL: CPT

## 2022-04-06 PROCEDURE — 6370000000 HC RX 637 (ALT 250 FOR IP): Performed by: STUDENT IN AN ORGANIZED HEALTH CARE EDUCATION/TRAINING PROGRAM

## 2022-04-06 PROCEDURE — 99285 EMERGENCY DEPT VISIT HI MDM: CPT

## 2022-04-06 PROCEDURE — 99223 1ST HOSP IP/OBS HIGH 75: CPT | Performed by: STUDENT IN AN ORGANIZED HEALTH CARE EDUCATION/TRAINING PROGRAM

## 2022-04-06 PROCEDURE — 82948 REAGENT STRIP/BLOOD GLUCOSE: CPT

## 2022-04-06 PROCEDURE — 84443 ASSAY THYROID STIM HORMONE: CPT

## 2022-04-06 PROCEDURE — 83735 ASSAY OF MAGNESIUM: CPT

## 2022-04-06 PROCEDURE — 85025 COMPLETE CBC W/AUTO DIFF WBC: CPT

## 2022-04-06 PROCEDURE — 94760 N-INVAS EAR/PLS OXIMETRY 1: CPT

## 2022-04-06 PROCEDURE — 87077 CULTURE AEROBIC IDENTIFY: CPT

## 2022-04-06 PROCEDURE — 84484 ASSAY OF TROPONIN QUANT: CPT

## 2022-04-06 PROCEDURE — 93306 TTE W/DOPPLER COMPLETE: CPT

## 2022-04-06 PROCEDURE — 81001 URINALYSIS AUTO W/SCOPE: CPT

## 2022-04-06 PROCEDURE — 83690 ASSAY OF LIPASE: CPT

## 2022-04-06 PROCEDURE — 36415 COLL VENOUS BLD VENIPUNCTURE: CPT

## 2022-04-06 PROCEDURE — 87086 URINE CULTURE/COLONY COUNT: CPT

## 2022-04-06 PROCEDURE — 96374 THER/PROPH/DIAG INJ IV PUSH: CPT

## 2022-04-06 PROCEDURE — 83605 ASSAY OF LACTIC ACID: CPT

## 2022-04-06 PROCEDURE — 6360000002 HC RX W HCPCS: Performed by: STUDENT IN AN ORGANIZED HEALTH CARE EDUCATION/TRAINING PROGRAM

## 2022-04-06 PROCEDURE — 1200000003 HC TELEMETRY R&B

## 2022-04-06 PROCEDURE — 80053 COMPREHEN METABOLIC PANEL: CPT

## 2022-04-06 PROCEDURE — 2580000003 HC RX 258: Performed by: STUDENT IN AN ORGANIZED HEALTH CARE EDUCATION/TRAINING PROGRAM

## 2022-04-06 PROCEDURE — 6360000002 HC RX W HCPCS: Performed by: PHYSICIAN ASSISTANT

## 2022-04-06 PROCEDURE — 93005 ELECTROCARDIOGRAM TRACING: CPT | Performed by: STUDENT IN AN ORGANIZED HEALTH CARE EDUCATION/TRAINING PROGRAM

## 2022-04-06 PROCEDURE — 83880 ASSAY OF NATRIURETIC PEPTIDE: CPT

## 2022-04-06 RX ORDER — CILOSTAZOL 100 MG/1
100 TABLET ORAL 2 TIMES DAILY
Status: DISCONTINUED | OUTPATIENT
Start: 2022-04-06 | End: 2022-04-06

## 2022-04-06 RX ORDER — TRAZODONE HYDROCHLORIDE 100 MG/1
100 TABLET ORAL NIGHTLY
Status: DISCONTINUED | OUTPATIENT
Start: 2022-04-06 | End: 2022-04-07 | Stop reason: HOSPADM

## 2022-04-06 RX ORDER — ONDANSETRON 2 MG/ML
4 INJECTION INTRAMUSCULAR; INTRAVENOUS EVERY 6 HOURS PRN
Status: DISCONTINUED | OUTPATIENT
Start: 2022-04-06 | End: 2022-04-07 | Stop reason: HOSPADM

## 2022-04-06 RX ORDER — INSULIN GLARGINE 100 [IU]/ML
10 INJECTION, SOLUTION SUBCUTANEOUS NIGHTLY
Status: DISCONTINUED | OUTPATIENT
Start: 2022-04-06 | End: 2022-04-07 | Stop reason: HOSPADM

## 2022-04-06 RX ORDER — ASPIRIN 81 MG/1
81 TABLET ORAL DAILY
Status: DISCONTINUED | OUTPATIENT
Start: 2022-04-06 | End: 2022-04-07 | Stop reason: HOSPADM

## 2022-04-06 RX ORDER — POTASSIUM CHLORIDE 20 MEQ/1
40 TABLET, EXTENDED RELEASE ORAL PRN
Status: DISCONTINUED | OUTPATIENT
Start: 2022-04-06 | End: 2022-04-07 | Stop reason: HOSPADM

## 2022-04-06 RX ORDER — SODIUM CHLORIDE 0.9 % (FLUSH) 0.9 %
5-40 SYRINGE (ML) INJECTION EVERY 12 HOURS SCHEDULED
Status: DISCONTINUED | OUTPATIENT
Start: 2022-04-06 | End: 2022-04-07 | Stop reason: HOSPADM

## 2022-04-06 RX ORDER — SODIUM CHLORIDE 9 MG/ML
INJECTION, SOLUTION INTRAVENOUS PRN
Status: DISCONTINUED | OUTPATIENT
Start: 2022-04-06 | End: 2022-04-07 | Stop reason: HOSPADM

## 2022-04-06 RX ORDER — CLOPIDOGREL BISULFATE 75 MG/1
75 TABLET ORAL DAILY
COMMUNITY

## 2022-04-06 RX ORDER — DONEPEZIL HYDROCHLORIDE 5 MG/1
5 TABLET, FILM COATED ORAL NIGHTLY
Status: DISCONTINUED | OUTPATIENT
Start: 2022-04-06 | End: 2022-04-07 | Stop reason: HOSPADM

## 2022-04-06 RX ORDER — SPIRONOLACTONE 25 MG/1
50 TABLET ORAL DAILY
Status: DISCONTINUED | OUTPATIENT
Start: 2022-04-06 | End: 2022-04-06

## 2022-04-06 RX ORDER — ONDANSETRON 2 MG/ML
4 INJECTION INTRAMUSCULAR; INTRAVENOUS ONCE
Status: COMPLETED | OUTPATIENT
Start: 2022-04-06 | End: 2022-04-06

## 2022-04-06 RX ORDER — ATROPINE SULFATE 0.4 MG/ML
0.4 AMPUL (ML) INJECTION EVERY 5 MIN PRN
Status: DISCONTINUED | OUTPATIENT
Start: 2022-04-06 | End: 2022-04-07 | Stop reason: HOSPADM

## 2022-04-06 RX ORDER — ACETAMINOPHEN 650 MG/1
650 SUPPOSITORY RECTAL EVERY 6 HOURS PRN
Status: DISCONTINUED | OUTPATIENT
Start: 2022-04-06 | End: 2022-04-07 | Stop reason: HOSPADM

## 2022-04-06 RX ORDER — CLOPIDOGREL BISULFATE 75 MG/1
75 TABLET ORAL DAILY
Status: DISCONTINUED | OUTPATIENT
Start: 2022-04-06 | End: 2022-04-07 | Stop reason: HOSPADM

## 2022-04-06 RX ORDER — POLYETHYLENE GLYCOL 3350 17 G/17G
17 POWDER, FOR SOLUTION ORAL DAILY PRN
Status: DISCONTINUED | OUTPATIENT
Start: 2022-04-06 | End: 2022-04-07 | Stop reason: HOSPADM

## 2022-04-06 RX ORDER — ATORVASTATIN CALCIUM 80 MG/1
80 TABLET, FILM COATED ORAL NIGHTLY
Status: DISCONTINUED | OUTPATIENT
Start: 2022-04-06 | End: 2022-04-07 | Stop reason: HOSPADM

## 2022-04-06 RX ORDER — DEXTROSE MONOHYDRATE 50 MG/ML
100 INJECTION, SOLUTION INTRAVENOUS PRN
Status: DISCONTINUED | OUTPATIENT
Start: 2022-04-06 | End: 2022-04-07 | Stop reason: HOSPADM

## 2022-04-06 RX ORDER — PANTOPRAZOLE SODIUM 40 MG/1
40 TABLET, DELAYED RELEASE ORAL
Status: DISCONTINUED | OUTPATIENT
Start: 2022-04-07 | End: 2022-04-07 | Stop reason: HOSPADM

## 2022-04-06 RX ORDER — AMLODIPINE BESYLATE 10 MG/1
10 TABLET ORAL DAILY
Status: DISCONTINUED | OUTPATIENT
Start: 2022-04-06 | End: 2022-04-07 | Stop reason: HOSPADM

## 2022-04-06 RX ORDER — POTASSIUM CHLORIDE 7.45 MG/ML
10 INJECTION INTRAVENOUS PRN
Status: DISCONTINUED | OUTPATIENT
Start: 2022-04-06 | End: 2022-04-07 | Stop reason: HOSPADM

## 2022-04-06 RX ORDER — ACETAMINOPHEN 325 MG/1
650 TABLET ORAL EVERY 6 HOURS PRN
Status: DISCONTINUED | OUTPATIENT
Start: 2022-04-06 | End: 2022-04-07 | Stop reason: HOSPADM

## 2022-04-06 RX ORDER — ALBUTEROL SULFATE 90 UG/1
2 AEROSOL, METERED RESPIRATORY (INHALATION) 4 TIMES DAILY PRN
Status: ON HOLD | COMMUNITY
End: 2022-04-06 | Stop reason: ALTCHOICE

## 2022-04-06 RX ORDER — ISOSORBIDE MONONITRATE 60 MG/1
120 TABLET, EXTENDED RELEASE ORAL DAILY
Status: DISCONTINUED | OUTPATIENT
Start: 2022-04-06 | End: 2022-04-07 | Stop reason: HOSPADM

## 2022-04-06 RX ORDER — DEXTROSE MONOHYDRATE 25 G/50ML
12.5 INJECTION, SOLUTION INTRAVENOUS PRN
Status: DISCONTINUED | OUTPATIENT
Start: 2022-04-06 | End: 2022-04-06

## 2022-04-06 RX ORDER — INSULIN GLARGINE 100 [IU]/ML
15 INJECTION, SOLUTION SUBCUTANEOUS NIGHTLY
Status: DISCONTINUED | OUTPATIENT
Start: 2022-04-06 | End: 2022-04-06

## 2022-04-06 RX ORDER — TRAZODONE HYDROCHLORIDE 100 MG/1
100 TABLET ORAL NIGHTLY
COMMUNITY
Start: 2022-03-04

## 2022-04-06 RX ORDER — CARVEDILOL 12.5 MG/1
12.5 TABLET ORAL 2 TIMES DAILY WITH MEALS
Status: ON HOLD | COMMUNITY
End: 2022-04-07 | Stop reason: HOSPADM

## 2022-04-06 RX ORDER — METFORMIN HYDROCHLORIDE 500 MG/1
500 TABLET, EXTENDED RELEASE ORAL
Status: DISCONTINUED | OUTPATIENT
Start: 2022-04-06 | End: 2022-04-06

## 2022-04-06 RX ORDER — ONDANSETRON 4 MG/1
4 TABLET, ORALLY DISINTEGRATING ORAL EVERY 8 HOURS PRN
Status: DISCONTINUED | OUTPATIENT
Start: 2022-04-06 | End: 2022-04-07 | Stop reason: HOSPADM

## 2022-04-06 RX ORDER — CARVEDILOL 6.25 MG/1
12.5 TABLET ORAL 2 TIMES DAILY WITH MEALS
Status: DISCONTINUED | OUTPATIENT
Start: 2022-04-06 | End: 2022-04-07 | Stop reason: HOSPADM

## 2022-04-06 RX ORDER — GUAIFENESIN AND CODEINE PHOSPHATE 100; 10 MG/5ML; MG/5ML
5 SOLUTION ORAL EVERY 4 HOURS PRN
Status: ON HOLD | COMMUNITY
End: 2022-07-22 | Stop reason: HOSPADM

## 2022-04-06 RX ORDER — ALBUTEROL SULFATE 90 UG/1
2 AEROSOL, METERED RESPIRATORY (INHALATION) EVERY 4 HOURS PRN
Status: DISCONTINUED | OUTPATIENT
Start: 2022-04-06 | End: 2022-04-07 | Stop reason: HOSPADM

## 2022-04-06 RX ORDER — SODIUM CHLORIDE 0.9 % (FLUSH) 0.9 %
5-40 SYRINGE (ML) INJECTION PRN
Status: DISCONTINUED | OUTPATIENT
Start: 2022-04-06 | End: 2022-04-07 | Stop reason: HOSPADM

## 2022-04-06 RX ORDER — NICOTINE POLACRILEX 4 MG
15 LOZENGE BUCCAL PRN
Status: DISCONTINUED | OUTPATIENT
Start: 2022-04-06 | End: 2022-04-06

## 2022-04-06 RX ORDER — MAGNESIUM SULFATE IN WATER 40 MG/ML
2000 INJECTION, SOLUTION INTRAVENOUS PRN
Status: DISCONTINUED | OUTPATIENT
Start: 2022-04-06 | End: 2022-04-07 | Stop reason: HOSPADM

## 2022-04-06 RX ORDER — TIZANIDINE 4 MG/1
4 TABLET ORAL EVERY 6 HOURS PRN
Status: DISCONTINUED | OUTPATIENT
Start: 2022-04-06 | End: 2022-04-06

## 2022-04-06 RX ORDER — DONEPEZIL HYDROCHLORIDE 10 MG/1
5 TABLET, FILM COATED ORAL NIGHTLY
COMMUNITY
Start: 2022-03-04 | End: 2023-03-04

## 2022-04-06 RX ORDER — HYDROCHLOROTHIAZIDE 25 MG/1
25 TABLET ORAL DAILY
Status: DISCONTINUED | OUTPATIENT
Start: 2022-04-06 | End: 2022-04-07 | Stop reason: HOSPADM

## 2022-04-06 RX ORDER — CILOSTAZOL 100 MG/1
100 TABLET ORAL 2 TIMES DAILY
Status: ON HOLD | COMMUNITY
End: 2022-04-06 | Stop reason: ALTCHOICE

## 2022-04-06 RX ADMIN — CLOPIDOGREL BISULFATE 75 MG: 75 TABLET ORAL at 17:32

## 2022-04-06 RX ADMIN — SODIUM CHLORIDE, PRESERVATIVE FREE 10 ML: 5 INJECTION INTRAVENOUS at 13:40

## 2022-04-06 RX ADMIN — ENOXAPARIN SODIUM 40 MG: 100 INJECTION SUBCUTANEOUS at 13:40

## 2022-04-06 RX ADMIN — INSULIN LISPRO 6 UNITS: 100 INJECTION, SOLUTION INTRAVENOUS; SUBCUTANEOUS at 13:30

## 2022-04-06 RX ADMIN — AMLODIPINE BESYLATE 10 MG: 10 TABLET ORAL at 13:38

## 2022-04-06 RX ADMIN — ASPIRIN 81 MG: 81 TABLET, COATED ORAL at 17:32

## 2022-04-06 RX ADMIN — TRAZODONE HYDROCHLORIDE 100 MG: 100 TABLET ORAL at 19:44

## 2022-04-06 RX ADMIN — SODIUM CHLORIDE, PRESERVATIVE FREE 10 ML: 5 INJECTION INTRAVENOUS at 19:41

## 2022-04-06 RX ADMIN — DONEPEZIL HYDROCHLORIDE 5 MG: 5 TABLET, FILM COATED ORAL at 19:41

## 2022-04-06 RX ADMIN — INSULIN GLARGINE 10 UNITS: 100 INJECTION, SOLUTION SUBCUTANEOUS at 19:58

## 2022-04-06 RX ADMIN — ONDANSETRON 4 MG: 2 INJECTION INTRAMUSCULAR; INTRAVENOUS at 06:24

## 2022-04-06 RX ADMIN — ATORVASTATIN CALCIUM 80 MG: 80 TABLET, FILM COATED ORAL at 19:41

## 2022-04-06 ASSESSMENT — PAIN SCALES - GENERAL
PAINLEVEL_OUTOF10: 0

## 2022-04-06 ASSESSMENT — ENCOUNTER SYMPTOMS
VOMITING: 1
NAUSEA: 1
DIARRHEA: 1

## 2022-04-06 NOTE — H&P
Hospitalist - History & Physical      Patient: Moriah Marin    Unit/Bed:8B-21/021-A  YOB: 1950  MRN: [id number]   Acct: [de-identified]   PCP: Renetta Berger MD    Date of Service: Pt seen/examined on 04/06/22  and Admitted to [Inpatient] with expected LOS [greater than] two midnights due to medical therapy. Chief Complaint:  Weakness, diarrhea    Assessment and Plan:-  1. Symptomatic bradycardia  a. Likely a component of metoprolol induced. Patient did not take his metoprolol this AM but did take last night before sleep  b. Check TSH, Mg.  Other electrolytes WNL  c. Initial troponin negative. Will check 3 hour troponin  d. Repeat EKG shows bradycardia with 1st degree AV block. Relatively unchanged from previous. Non-specific T wave inversions in inferior/lateral leads. J point elevation noted previously in 2018. No symptoms of chest pain  e. Atropine prn for HR <50 or symptomatic  f. Holding metoprolol. g. Check orthostatic VS  h. Echo ordered. i. PT/OT evaluations  j. Holding HCTZ, Imdur in case of recurrent bradycarrhythmia with hypotension. Likely resume tomorrow. 2. Diarrhea  a. Reportedly only 1 episodes of watery stools this morning  b. Adits to diaphoresis and cold chills. Will check GI molecular panel. 3. DMT2  a. Hold home metformin  b. Start basal/SSI. c. Diabetic diet  d. Hypoglycemic protocol. 4. COPD, not in acute exacerbation  a. Continue stiolto and prn albuterol  5. CAD s/p remote PCI  a. Holding metoprolol given bradycardia on arrival.  Continue asa and lipitor. Patient not on ACEI/ARB at baseline. 6. HTN  a. Continue aldactone and amdolopine. Holding imdur and metoprolol  7. HLD  a. Continue lipitor  8. GERD  a. Continue protonix    Disposition: Potential discharge home in 24-48 hours. Patient is current with [facility] and previously had home PT ordered through the [place].      History Of Present Illness:    Patient is a very pleasant 20-year-old male with medical history of CAD status post remote PCI and COPD who presents the hospital with profound weakness and dizziness. The patient states that he was in his normal state of health yesterday evening when he went to sleep. The patient woke up around 3 AM and complained of some weakness. He ended up going to the bathroom where he states that he had a watery bowel movement. Denies any significant abdominal pain. He states then became very weak, diaphoretic, and complained of cold chills. He was unable to stand up due to his profound weakness. He called his wife to the bathroom. She was unable to lift him. He was able to slide down to the floor and attempted to crawl back out into the living room. He was unable to do so, his wife became increasingly concerned and called EMS for evaluation. On arrival, EMS noted patient to be bradycardic with a heart rate in the 40s. He received a total of 1 mg of atropine was brought to the hospital for evaluation. The patient denied having any recent sick contacts, change in medications, change in his diet, chest pain, shortness of breath, or palpitations. Patient does admit to nausea, and complains of dry heaves. Denies any emesis. States he is feeling better at this time.         Past Medical History:        Diagnosis Date    Arthritis     Asthma     Blood circulation, collateral     bilateral LE    CAD (coronary artery disease)     COPD (chronic obstructive pulmonary disease) (HCC)     Diabetes mellitus (HCC)     GERD (gastroesophageal reflux disease)     Hyperlipidemia     Hypertension     Other disorders of kidney and ureter in diseases classified elsewhere     Psychiatric problem        Past Surgical History:        Procedure Laterality Date    CARDIAC CATHETERIZATION      CAROTID ENDARTERECTOMY Left 12/26/2018    LEFT CAROTID ENDARTERECTOMY performed by [facility]imes [facility] at [facility]      right eye for detached retina    TONSILLECTOMY         Home Medications:   No current facility-administered medications on file prior to encounter. Current Outpatient Medications on File Prior to Encounter   Medication Sig Dispense Refill    cilostazol (PLETAL) 100 MG tablet Take 100 mg by mouth 2 times daily      tiZANidine (ZANAFLEX) 4 MG tablet Take 1 tablet by mouth every 6 hours as needed (muscle spasm) 20 tablet 0    metoprolol tartrate (LOPRESSOR) 25 MG tablet Take 25 mg by mouth 2 times daily      acetaminophen (TYLENOL) 325 MG tablet Take 2 tablets by mouth every 4 hours as needed for Pain 120 tablet 3    spironolactone (ALDACTONE) 50 MG tablet Take 1 tablet by mouth daily 30 tablet 3    isosorbide mononitrate (IMDUR) 120 MG extended release tablet Take 1 tablet by mouth daily 30 tablet 0    atorvastatin (LIPITOR) 80 MG tablet Take 1 tablet by mouth daily 30 tablet 0    metFORMIN (GLUCOPHAGE XR) 500 MG extended release tablet Take 1 tablet by mouth Daily with supper 30 tablet 0    pantoprazole (PROTONIX) 40 MG tablet Take 1 tablet by mouth every morning (before breakfast) 30 tablet 11    albuterol sulfate  (90 Base) MCG/ACT inhaler Inhale 2 puffs into the lungs every 4 hours as needed for Wheezing or Shortness of Breath      Tiotropium Bromide-Olodaterol (STIOLTO RESPIMAT) 2.5-2.5 MCG/ACT AERS Inhale 2 puffs into the lungs daily      Omega 3-6-9 Fatty Acids (GNP TRIPLE OMEGA COMPLEX PO) Take 2 capsules by mouth 2 times daily      hydrochlorothiazide (HYDRODIURIL) 25 MG tablet Take 25 mg by mouth daily      nitroGLYCERIN (NITROSTAT) 0.4 MG SL tablet Place 1 tablet under the tongue every 5 minutes as needed for Chest pain 25 tablet 3    amLODIPine (NORVASC) 10 MG tablet Take 1 tablet by mouth daily 30 tablet 3    aspirin 81 MG EC tablet Take 81 mg by mouth daily         Allergies:    Patient has no known allergies. Social History:    reports that he quit smoking about 7 years ago.  He has a 15.00 pack-year smoking history. He has never used smokeless tobacco. He reports that he does not drink alcohol and does not use drugs. Family History:       Problem Relation Age of Onset   Low[place] Cancer Mother         bladder    Diabetes Mother     Asthma Father     Diabetes Sister     Heart Disease Brother         MI @ 52    Heart Disease Sister         MI in 45s       Diet:  ADULT DIET; Regular; 4 carb choices (60 gm/meal)    Review of systems:   Pertinent positives as noted in the HPI. All other systems reviewed and negative. PHYSICAL EXAM:  BP (!) 154/70   Pulse 66   Temp 97.5 °F (36.4 °C) (Oral)   Resp 16   Ht 5' 10\" (1.778 m)   Wt 204 lb 12.8 oz (92.9 kg)   SpO2 98%   BMI 29.39 kg/m²   General appearance: No apparent distress, appears stated age and cooperative. HEENT: Normal cephalic, atraumatic without obvious deformity. Pupils equal, round, and reactive to light. Extra ocular muscles intact. Conjunctivae/corneas clear. Neck: Supple, with full range of motion. No jugular venous distention. Trachea midline. Respiratory: Mildly diminished breath sounds throughout all lung fields. No appreciable wheezes, rales, or rhonchi. Cardiovascular: Regular rate. Heart rate approximately 60-65. Regular rhythm with normal S1/S2 without murmurs, rubs or gallops. Abdomen: Soft, and nondistended. Normal bowel sounds. Mild tenderness to palpation in the right upper quadrant. Musculoskeletal:  No clubbing, cyanosis or edema bilaterally. Skin: Skin color, texture, turgor normal.  No rashes or lesions. Neurologic:  Neurovascularly intact without any focal sensory/motor deficits.  Cranial nerves: II-XII intact, grossly non-focal.  Psychiatric: Alert and oriented, thought content appropriate, normal insight  Capillary Refill: Brisk,< 3 seconds   Peripheral Pulses: +2 palpable, equal bilaterally     Labs:   Recent Labs     04/06/22  0618   WBC 9.6   HGB 13.1*   HCT 41.4*        Recent Labs     04/06/22  0700   NA 139   K 3.8      CO2 22*   BUN 14   CREATININE 0.8   CALCIUM 8.5     Recent Labs     04/06/22  0700   AST 17   ALT 12   BILITOT 0.4   ALKPHOS 115     No results for input(s): INR in the last 72 hours. No results for input(s): Zettie Binet in the last 72 hours. Urinalysis:    Lab Results   Component Value Date    NITRU NEGATIVE 11/06/2018    WBCUA 0-2 11/06/2018    BACTERIA FEW 11/06/2018    RBCUA NONE SEEN 11/06/2018    BLOODU NEGATIVE 11/06/2018    SPECGRAV 1.015 11/06/2018       Radiology:   No orders to display     No results found.     Electronically signed by Meldon Nissen, DO on 4/6/2022 at 11:10 AM

## 2022-04-06 NOTE — ED NOTES
Called 8B to notify that pt would be coming up to unit still. Pt transported by cart, stable.       Janna Wilhelm  04/06/22 3027

## 2022-04-06 NOTE — ED NOTES
ED nurse-to-nurse bedside report    Chief Complaint   Patient presents with    Emesis      LOC: alert and orientated to name, place, date  Vital signs   Vitals:    04/06/22 0604 04/06/22 0609 04/06/22 0611   BP: (!) 154/60     Pulse: 70 68    Resp: 16     Temp:   97 °F (36.1 °C)   TempSrc:   Oral   SpO2: 98%     Weight: 198 lb (89.8 kg)     Height: 5' 10\" (1.778 m)        Pain:    Pain Interventions: none  Pain Goal: none  Oxygen: No    Current needs required nione   Telemetry: Yes  LDAs:   Peripheral IV 04/06/22 Left Antecubital (Active)   Site Assessment Clean;Dry; Intact 04/06/22 0608   Line Status Blood return noted; Flushed 04/06/22 0608   Dressing Status Clean;Dry; Intact 04/06/22 2781   Dressing Intervention New 04/06/22 0608     Continuous Infusions:   Mobility: Requires assistance * 1  Reyes Fall Risk Score: No flowsheet data found. Fall Interventions: call light in reach.  Side rails x2  Report given to: [facility]aud[facility] Beverly RN  04/06/22 4624

## 2022-04-06 NOTE — PLAN OF CARE
Problem: Falls - Risk of:  Goal: Will remain free from falls  Description: Will remain free from falls  Outcome: Ongoing  Goal: Absence of physical injury  Description: Absence of physical injury  Outcome: Ongoing   Patient educated on fall precautions, and to notify nursing staff when he needs to get up. No falls this shift. Appropriate call light usage. Problem: Cardiac:  Goal: Ability to maintain an adequate cardiac output will improve  Description: Ability to maintain an adequate cardiac output will improve  Outcome: Ongoing  Goal: Hemodynamic stability will improve  Description: Hemodynamic stability will improve  Outcome: Ongoing   See Flowsheets for vital signs. On telemetry. Problem: Fluid Volume:  Goal: Ability to achieve and maintain adequate urine output will improve  Description: Ability to achieve and maintain adequate urine output will improve  Outcome: Ongoing  Goal: Ability to maintain a balanced intake and output will improve  Description: Ability to maintain a balanced intake and output will improve  Outcome: Ongoing   Continue to monitor PO intake and output. Problem: Metabolic:  Goal: Ability to maintain appropriate glucose levels will improve  Description: Ability to maintain appropriate glucose levels will improve  Outcome: Ongoing     Problem: Skin Integrity:  Goal: Risk for impaired skin integrity will decrease  Description: Risk for impaired skin integrity will decrease  Outcome: Ongoing     Problem: Tissue Perfusion:  Goal: Adequacy of tissue perfusion will improve  Description: Adequacy of tissue perfusion will improve  Outcome: Ongoing   Patient frequently changes positions and shifts his weight in bed. No skin issues currently present.

## 2022-04-06 NOTE — ED NOTES
Patient is resting in bed with easy and unlabored respirations. Call light in reach. Side rails up x2. Patient denies further complaints or concerns. Will monitor.         Anca Zafar RN  04/06/22 8528

## 2022-04-06 NOTE — ED TRIAGE NOTES
Pt presents to ED via EMS for c/o vomiting and bradycardia. Pt states that he has N/V/D that started last night. Pt had emesis and felt weak. He laid on bathroom floor and called EMS. EMS found pt was bill 45s. Gave total 1mg Atropine. Heart rate upon arrival to ED 60s-70s. Pt was given 250 mL fluid bolus during transport. Pt denies pain at this time.

## 2022-04-06 NOTE — ED NOTES
Patient is resting in bed with easy and unlabored respirations. Call light in reach. Side rails up x2. Patient denies further complaints or concerns. Will monitor.         Vince Raman RN  04/06/22 5774

## 2022-04-06 NOTE — ED PROVIDER NOTES
[facility]  eMERGENCY dEPARTMENT eNCOUnter          CHIEF COMPLAINT       Chief Complaint   Patient presents with    Emesis       Nurses Notes reviewed and I agree except as noted inthe HPI. HISTORY OF PRESENT ILLNESS    Caterina Peacock is a 66 y.o. male who presents to the Emergency Department for the evaluation of vomiting. He states his symptoms began 2 days ago when he got very hot and weak. Patient states that he woke up this morning feeling very weak, sweating, having chills, and states shortly after started vomiting and having diarrhea. He states he had Bruce Nanjemoy D's 2 days ago and is unsure of any sick close contacts. Patient states \"I have not felt like this in a very long time. \"  Reports that while in the commode at home, he was very weak and lightheaded, unable to stand and lay down on the floor and called EMS. Patient states the fluids and medication given by EMS have helped his current symptoms. Patient states he is currently not experiencing any nausea but during the interview he did vomit in a bag. Patient denies any use of alcohol, tobacco, or drugs. Patient further denies any associated abdominal pain, chest pain, headaches, or known history of bradycardia. The HPI was provided by the patient. REVIEW OF SYSTEMS     Review of Systems   Constitutional: Positive for chills, diaphoresis and fatigue. Gastrointestinal: Positive for diarrhea, nausea and vomiting. Neurological: Positive for weakness and light-headedness. Negative for syncope. All other systems reviewed and are negative.       PAST MEDICAL HISTORY    has a past medical history of Arthritis, Asthma, Blood circulation, collateral, CAD (coronary artery disease), COPD (chronic obstructive pulmonary disease) ([address] Diabetes mellitus ([address] GERD (gastroesophageal reflux disease), Hyperlipidemia, Hypertension, Other disorders of kidney and ureter in diseases classified elsewhere, and Psychiatric problem. SURGICAL HISTORY      has a past surgical history that includes Tonsillectomy; Colonoscopy; eye surgery; Cardiac catheterization; and Carotid endarterectomy (Left, 2018). CURRENT MEDICATIONS       Previous Medications    ACETAMINOPHEN (TYLENOL) 325 MG TABLET    Take 2 tablets by mouth every 4 hours as needed for Pain    ALBUTEROL SULFATE  (90 BASE) MCG/ACT INHALER    Inhale 2 puffs into the lungs every 4 hours as needed for Wheezing or Shortness of Breath    AMLODIPINE (NORVASC) 10 MG TABLET    Take 1 tablet by mouth daily    ASPIRIN 81 MG EC TABLET    Take 81 mg by mouth daily    ATORVASTATIN (LIPITOR) 80 MG TABLET    Take 1 tablet by mouth daily    CILOSTAZOL (PLETAL) 100 MG TABLET    Take 100 mg by mouth 2 times daily    HYDROCHLOROTHIAZIDE (HYDRODIURIL) 25 MG TABLET    Take 25 mg by mouth daily    ISOSORBIDE MONONITRATE (IMDUR) 120 MG EXTENDED RELEASE TABLET    Take 1 tablet by mouth daily    METFORMIN (GLUCOPHAGE XR) 500 MG EXTENDED RELEASE TABLET    Take 1 tablet by mouth Daily with supper    METOPROLOL TARTRATE (LOPRESSOR) 25 MG TABLET    Take 25 mg by mouth 2 times daily    NITROGLYCERIN (NITROSTAT) 0.4 MG SL TABLET    Place 1 tablet under the tongue every 5 minutes as needed for Chest pain    OMEGA 3-6-9 FATTY ACIDS (GNP TRIPLE OMEGA COMPLEX PO)    Take 2 capsules by mouth 2 times daily    PANTOPRAZOLE (PROTONIX) 40 MG TABLET    Take 1 tablet by mouth every morning (before breakfast)    SPIRONOLACTONE (ALDACTONE) 50 MG TABLET    Take 1 tablet by mouth daily    TIOTROPIUM BROMIDE-OLODATEROL (STIOLTO RESPIMAT) 2.5-2.5 MCG/ACT AERS    Inhale 2 puffs into the lungs daily    TIZANIDINE (ZANAFLEX) 4 MG TABLET    Take 1 tablet by mouth every 6 hours as needed (muscle spasm)       ALLERGIES     has No Known Allergies. FAMILY HISTORY     He indicated that his mother is . He indicated that his father is . He indicated that only one of his two sisters is alive.  He indicated that his brother is . family history includes Asthma in his father; Cancer in his mother; Diabetes in his mother and sister; Heart Disease in his brother and sister. SOCIAL HISTORY      reports that he quit smoking about 7 years ago. He has a 15.00 pack-year smoking history. He has never used smokeless tobacco. He reports that he does not drink alcohol and does not use drugs. PHYSICAL EXAM     INITIAL VITALS:  height is 5' 10\" (1.778 m) and weight is 198 lb (89.8 kg). His oral temperature is 97 °F (36.1 °C). His blood pressure is 142/64 (abnormal) and his pulse is 63. His respiration is 17 and oxygen saturation is 98%. Physical Exam  Vitals and nursing note reviewed. Constitutional:       Appearance: He is ill-appearing (vomiting during encounter). HENT:      Head: Normocephalic and atraumatic. Mouth/Throat:      Mouth: Mucous membranes are moist.      Pharynx: Oropharynx is clear. Eyes:      Conjunctiva/sclera: Conjunctivae normal.   Cardiovascular:      Rate and Rhythm: Normal rate and regular rhythm. Heart sounds: Normal heart sounds. No murmur heard. No friction rub. Pulmonary:      Effort: Pulmonary effort is normal. No respiratory distress. Breath sounds: Normal breath sounds. No wheezing. Abdominal:      General: Abdomen is flat. There is no distension. Palpations: Abdomen is soft. Tenderness: There is no abdominal tenderness. There is no guarding. Musculoskeletal:      Cervical back: Normal range of motion. Right lower leg: Edema (1+) present. Left lower leg: Edema (1+) present. Skin:     General: Skin is warm and dry. Neurological:      General: No focal deficit present. Mental Status: He is alert and oriented to person, place, and time.    Psychiatric:         Mood and Affect: Mood normal.         Behavior: Behavior normal.         DIFFERENTIAL DIAGNOSIS:   Differential diagnoses are discussed    DIAGNOSTIC RESULTS EKG: All EKG's are interpreted by the Emergency Department Physician who either signs or Co-signsthis chart in the absence of a cardiologist.    Marty Krishna. Rate: 66bpm  PRinterval: 216 ms  QRS duration: 118 ms  QTc: 446 ms  P-R-T axes: 36, -35, 76  Sinus rhythm with 1st degree AV block. No STEMI. Compared to old EKG on 12-26-18      RADIOLOGY: non-plain film images(s) such as CT, Ultrasound and MRI are read by the radiologist.    No orders to display       LABS:      Labs Reviewed   CBC WITH AUTO DIFFERENTIAL - Abnormal; Notable for the following components:       Result Value    RBC 4.49 (*)     Hemoglobin 13.1 (*)     Hematocrit 41.4 (*)     MCHC 31.6 (*)     MPV 12.8 (*)     All other components within normal limits   COMPREHENSIVE METABOLIC PANEL W/ REFLEX TO MG FOR LOW K - Abnormal; Notable for the following components:    Glucose 265 (*)     CO2 22 (*)     Total Protein 6.0 (*)     All other components within normal limits   GASTROINTESTINAL PANEL, MOLECULAR   LACTATE, SEPSIS   SPECIMEN REJECTION   TROPONIN   BRAIN NATRIURETIC PEPTIDE   LIPASE   ANION GAP   GLOMERULAR FILTRATION RATE, ESTIMATED   OSMOLALITY   URINALYSIS WITH REFLEX TO CULTURE   TROPONIN   TSH   MAGNESIUM   URINALYSIS WITH MICROSCOPIC       EMERGENCY DEPARTMENT COURSE:   Vitals:    Vitals:    04/06/22 0604 04/06/22 0609 04/06/22 0611 04/06/22 0700   BP: (!) 154/60   (!) 142/64   Pulse: 70 68  63   Resp: 16   17   Temp:   97 °F (36.1 °C)    TempSrc:   Oral    SpO2: 98%   98%   Weight: 198 lb (89.8 kg)      Height: 5' 10\" (1.778 m)         6:23 AM EDT: The patient was seen and evaluated. Patient presents for complaints of vomiting and diarrhea that began yesterday evening/early this morning associate with generalized weakness and lightheadedness. Upon EMS arrival, patient was lying on the floor of the bathroom with heart rate of 44 and was given a total of 1 mg atropine with improvement in his symptoms.   Denied persistent complaints other than mild generalized weakness on initial evaluation, though he was noted to have an episode of emesis during this evaluation as well. He has heart rate maintained in the normal range during his ED stay and was treated with Zofran and total of 1 L normal saline in the ED. Discussed observation in the hospital given bradycardia requiring atropine and patient was agreeable with this plan. He will be admitted to the hospitalist service for further care. CRITICAL CARE:   None    CONSULTS:  Hospitalist    PROCEDURES:  None    FINAL IMPRESSION      1. Symptomatic bradycardia    2. Gastroenteritis          DISPOSITION/PLAN   Admit    PATIENT REFERRED TO:  No follow-up provider specified.     DISCHARGEMEDICATIONS:  New Prescriptions    No medications on file       (Please note that portions of this note were completedwith a voice recognition program.  Efforts were made to edit the dictations but occasionally words are mis-transcribed.)       Jeronimo Wilks PA-C  04/06/22 5704

## 2022-04-07 VITALS
BODY MASS INDEX: 29.32 KG/M2 | OXYGEN SATURATION: 94 % | WEIGHT: 204.8 LBS | TEMPERATURE: 97.9 F | HEART RATE: 63 BPM | RESPIRATION RATE: 18 BRPM | DIASTOLIC BLOOD PRESSURE: 70 MMHG | HEIGHT: 70 IN | SYSTOLIC BLOOD PRESSURE: 155 MMHG

## 2022-04-07 LAB
ANION GAP SERPL CALCULATED.3IONS-SCNC: 12 MEQ/L (ref 8–16)
BASOPHILS # BLD: 0.5 %
BASOPHILS ABSOLUTE: 0 THOU/MM3 (ref 0–0.1)
BUN BLDV-MCNC: 13 MG/DL (ref 7–22)
CALCIUM SERPL-MCNC: 9 MG/DL (ref 8.5–10.5)
CHLORIDE BLD-SCNC: 105 MEQ/L (ref 98–111)
CO2: 24 MEQ/L (ref 23–33)
CREAT SERPL-MCNC: 0.9 MG/DL (ref 0.4–1.2)
EOSINOPHIL # BLD: 1.5 %
EOSINOPHILS ABSOLUTE: 0.1 THOU/MM3 (ref 0–0.4)
ERYTHROCYTE [DISTWIDTH] IN BLOOD BY AUTOMATED COUNT: 12.4 % (ref 11.5–14.5)
ERYTHROCYTE [DISTWIDTH] IN BLOOD BY AUTOMATED COUNT: 42.2 FL (ref 35–45)
GFR SERPL CREATININE-BSD FRML MDRD: > 90 ML/MIN/1.73M2
GLUCOSE BLD-MCNC: 106 MG/DL (ref 70–108)
GLUCOSE BLD-MCNC: 133 MG/DL (ref 70–108)
GLUCOSE BLD-MCNC: 133 MG/DL (ref 70–108)
HCT VFR BLD CALC: 41.8 % (ref 42–52)
HEMOGLOBIN: 13 GM/DL (ref 14–18)
IMMATURE GRANS (ABS): 0.02 THOU/MM3 (ref 0–0.07)
IMMATURE GRANULOCYTES: 0.2 %
LYMPHOCYTES # BLD: 24 %
LYMPHOCYTES ABSOLUTE: 2 THOU/MM3 (ref 1–4.8)
MCH RBC QN AUTO: 28.8 PG (ref 26–33)
MCHC RBC AUTO-ENTMCNC: 31.1 GM/DL (ref 32.2–35.5)
MCV RBC AUTO: 92.7 FL (ref 80–94)
MONOCYTES # BLD: 5.8 %
MONOCYTES ABSOLUTE: 0.5 THOU/MM3 (ref 0.4–1.3)
NUCLEATED RED BLOOD CELLS: 0 /100 WBC
ORGANISM: ABNORMAL
PLATELET # BLD: 190 THOU/MM3 (ref 130–400)
PMV BLD AUTO: 12.4 FL (ref 9.4–12.4)
POTASSIUM REFLEX MAGNESIUM: 3.7 MEQ/L (ref 3.5–5.2)
RBC # BLD: 4.51 MILL/MM3 (ref 4.7–6.1)
SEG NEUTROPHILS: 68 %
SEGMENTED NEUTROPHILS ABSOLUTE COUNT: 5.6 THOU/MM3 (ref 1.8–7.7)
SODIUM BLD-SCNC: 141 MEQ/L (ref 135–145)
URINE CULTURE REFLEX: ABNORMAL
WBC # BLD: 8.2 THOU/MM3 (ref 4.8–10.8)

## 2022-04-07 PROCEDURE — 99239 HOSP IP/OBS DSCHRG MGMT >30: CPT | Performed by: STUDENT IN AN ORGANIZED HEALTH CARE EDUCATION/TRAINING PROGRAM

## 2022-04-07 PROCEDURE — 80048 BASIC METABOLIC PNL TOTAL CA: CPT

## 2022-04-07 PROCEDURE — 94760 N-INVAS EAR/PLS OXIMETRY 1: CPT

## 2022-04-07 PROCEDURE — 36415 COLL VENOUS BLD VENIPUNCTURE: CPT

## 2022-04-07 PROCEDURE — 94640 AIRWAY INHALATION TREATMENT: CPT

## 2022-04-07 PROCEDURE — 97165 OT EVAL LOW COMPLEX 30 MIN: CPT

## 2022-04-07 PROCEDURE — 6370000000 HC RX 637 (ALT 250 FOR IP): Performed by: STUDENT IN AN ORGANIZED HEALTH CARE EDUCATION/TRAINING PROGRAM

## 2022-04-07 PROCEDURE — 2580000003 HC RX 258: Performed by: STUDENT IN AN ORGANIZED HEALTH CARE EDUCATION/TRAINING PROGRAM

## 2022-04-07 PROCEDURE — 97535 SELF CARE MNGMENT TRAINING: CPT

## 2022-04-07 PROCEDURE — 82948 REAGENT STRIP/BLOOD GLUCOSE: CPT

## 2022-04-07 PROCEDURE — 6360000002 HC RX W HCPCS: Performed by: STUDENT IN AN ORGANIZED HEALTH CARE EDUCATION/TRAINING PROGRAM

## 2022-04-07 PROCEDURE — 85025 COMPLETE CBC W/AUTO DIFF WBC: CPT

## 2022-04-07 RX ORDER — SODIUM CHLORIDE 9 MG/ML
INJECTION, SOLUTION INTRAVENOUS CONTINUOUS
Status: DISCONTINUED | OUTPATIENT
Start: 2022-04-07 | End: 2022-04-07 | Stop reason: HOSPADM

## 2022-04-07 RX ADMIN — SODIUM CHLORIDE, PRESERVATIVE FREE 10 ML: 5 INJECTION INTRAVENOUS at 08:33

## 2022-04-07 RX ADMIN — ENOXAPARIN SODIUM 40 MG: 100 INJECTION SUBCUTANEOUS at 08:36

## 2022-04-07 RX ADMIN — CLOPIDOGREL BISULFATE 75 MG: 75 TABLET ORAL at 08:36

## 2022-04-07 RX ADMIN — PANTOPRAZOLE SODIUM 40 MG: 40 TABLET, DELAYED RELEASE ORAL at 04:07

## 2022-04-07 RX ADMIN — ASPIRIN 81 MG: 81 TABLET, COATED ORAL at 08:36

## 2022-04-07 RX ADMIN — AMLODIPINE BESYLATE 10 MG: 10 TABLET ORAL at 08:36

## 2022-04-07 RX ADMIN — TIOTROPIUM BROMIDE AND OLODATEROL 2 PUFF: 3.124; 2.736 SPRAY, METERED RESPIRATORY (INHALATION) at 09:24

## 2022-04-07 RX ADMIN — ISOSORBIDE MONONITRATE 120 MG: 60 TABLET, EXTENDED RELEASE ORAL at 10:03

## 2022-04-07 RX ADMIN — SODIUM CHLORIDE: 9 INJECTION, SOLUTION INTRAVENOUS at 10:08

## 2022-04-07 ASSESSMENT — PAIN SCALES - GENERAL
PAINLEVEL_OUTOF10: 0
PAINLEVEL_OUTOF10: 0

## 2022-04-07 NOTE — PLAN OF CARE
Problem: Falls - Risk of:  Goal: Will remain free from falls  Description: Will remain free from falls  4/7/2022 0940 by GIN Smiley  Outcome: Ongoing  Note: Bed low, call light in reach, alarms in place. Problem: Cardiac:  Goal: Ability to maintain an adequate cardiac output will improve  Description: Ability to maintain an adequate cardiac output will improve  4/7/2022 0940 by GIN Smiley  Outcome: Ongoing  Note: Vital signs remain within normal ranges      Problem: Fluid Volume:  Goal: Ability to achieve and maintain adequate urine output will improve  Description: Ability to achieve and maintain adequate urine output will improve  4/7/2022 0940 by GIN Smiley  Outcome: Ongoing  Note: Patient having at least 30 mL of urine output per hour. 4/6/2022 2341 by Abdirizak Mary RN  Outcome: Ongoing     Problem: Respiratory:  Goal: Respiratory status will improve  Description: Respiratory status will improve  4/7/2022 0940 by GIN Smiley  Outcome: Ongoing  Note: Lung sounds clear, able to maintain patent airway. 4/6/2022 2341 by Abdirizak Mary RN  Outcome: Ongoing     Problem: Activity:  Goal: Risk for activity intolerance will decrease  Description: Risk for activity intolerance will decrease  4/7/2022 0940 by IGN Smiley  Outcome: Ongoing  Note: Patient up to chair and ambulates in hallway. 4/6/2022 2341 by Abdirizak Mary RN  Outcome: Ongoing     Problem: Health Behavior:  Goal: Ability to identify and utilize available resources and services will improve  Description: Ability to identify and utilize available resources and services will improve  4/7/2022 0940 by GIN Smiley  Outcome: Ongoing  Note: Patient able to verbalize needs both here and needed at home.    4/6/2022 2341 by Abdirizak Mary RN  Outcome: Ongoing     Problem: Nutritional:  Goal: Maintenance of adequate nutrition will improve  Description: Maintenance of adequate nutrition will improve  4/7/2022 0940 by Baljit RN  Outcome: Ongoing  Note: Patient tolerating meals and oral intake. 4/6/2022 2341 by Dany Sanchez RN  Outcome: Ongoing     Problem: Skin Integrity:  Goal: Risk for impaired skin integrity will decrease  Description: Risk for impaired skin integrity will decrease  4/7/2022 0940 by Kvng Younger RN  Outcome: Ongoing  Note: Patient repositioning self in bed.    4/6/2022 2341 by Dany Sanchez RN  Outcome: Ongoing

## 2022-04-07 NOTE — PLAN OF CARE
Problem: Falls - Risk of:  Goal: Will remain free from falls  Description: Will remain free from falls  4/6/2022 2341 by Neena Gallagher RN  Outcome: Ongoing     Problem: Falls - Risk of:  Goal: Absence of physical injury  Description: Absence of physical injury  4/6/2022 2341 by Neena Gallagher RN  Outcome: Ongoing     Problem: Cardiac:  Goal: Ability to maintain an adequate cardiac output will improve  Description: Ability to maintain an adequate cardiac output will improve  4/6/2022 2341 by Neena Gallagher RN  Outcome: Ongoing     Problem: Cardiac:  Goal: Hemodynamic stability will improve  Description: Hemodynamic stability will improve  4/6/2022 2341 by Neena Gallagher RN  Outcome: Ongoing     Problem: Fluid Volume:  Goal: Ability to achieve and maintain adequate urine output will improve  Description: Ability to achieve and maintain adequate urine output will improve  4/6/2022 2341 by Neena Gallagher RN  Outcome: Ongoing     Problem: Fluid Volume:  Goal: Ability to maintain a balanced intake and output will improve  Description: Ability to maintain a balanced intake and output will improve  4/6/2022 2341 by Neena Gallagher RN  Outcome: Ongoing     Problem: Respiratory:  Goal: Respiratory status will improve  Description: Respiratory status will improve  Outcome: Ongoing     Problem:  Activity:  Goal: Risk for activity intolerance will decrease  Description: Risk for activity intolerance will decrease  Outcome: Ongoing     Problem: Coping:  Goal: Ability to adjust to condition or change in health will improve  Description: Ability to adjust to condition or change in health will improve  Outcome: Ongoing     Problem: Health Behavior:  Goal: Ability to identify and utilize available resources and services will improve  Description: Ability to identify and utilize available resources and services will improve  Outcome: Ongoing     Problem: Health Behavior:  Goal: Ability to manage health-related needs will improve  Description: Ability to manage health-related needs will improve  Outcome: Ongoing     Problem: Metabolic:  Goal: Ability to maintain appropriate glucose levels will improve  Description: Ability to maintain appropriate glucose levels will improve  4/6/2022 2341 by Sarah Noonan RN  Outcome: Ongoing     Problem: Nutritional:  Goal: Maintenance of adequate nutrition will improve  Description: Maintenance of adequate nutrition will improve  Outcome: Ongoing     Problem: Nutritional:  Goal: Progress toward achieving an optimal weight will improve  Description: Progress toward achieving an optimal weight will improve  Outcome: Ongoing     Problem: Physical Regulation:  Goal: Complications related to the disease process, condition or treatment will be avoided or minimized  Description: Complications related to the disease process, condition or treatment will be avoided or minimized  Outcome: Ongoing     Problem: Physical Regulation:  Goal: Diagnostic test results will improve  Description: Diagnostic test results will improve  Outcome: Ongoing     Problem: Skin Integrity:  Goal: Risk for impaired skin integrity will decrease  Description: Risk for impaired skin integrity will decrease  4/6/2022 2341 by Sarah Noonan RN  Outcome: Ongoing     Problem: Tissue Perfusion:  Goal: Adequacy of tissue perfusion will improve  Description: Adequacy of tissue perfusion will improve  4/6/2022 2341 by Sarah Noonan RN  Outcome: Ongoing    Care plan reviewed with patient. Patient verbalize understanding of the plan of care and contribute to goal setting.

## 2022-04-07 NOTE — PROGRESS NOTES
Klaus Bangura 58  INPATIENT OCCUPATIONAL THERAPY  [facility] MED SURG 8B  EVALUATION    Time:  Time In: 745  Time Out: 4859  Timed Code Treatment Minutes: 12 Minutes  Minutes: 32          Date: 2022  Patient Name: Gracia Martinez,   Gender: male      MRN: [id number]  : 1950  (66 y.o.)  Referring Practitioner: Rodney Talley DO  Diagnosis: Symptomatic bradycardia  Additional Pertinent Hx: Per chart review, \" Gracia Martinez is a 66 y.o. male who presents to the Emergency Department for the evaluation of vomiting. He states his symptoms began 2 days ago when he got very hot and weak. Patient states that he woke up this morning feeling very weak, sweating, having chills, and states shortly after started vomiting and having diarrhea. He states he had Feliberto Clan D's 2 days ago and is unsure of any sick close contacts. Patient states \"I have not felt like this in a very long time. \"  Reports that while in the commode at home, he was very weak and lightheaded, unable to stand and lay down on the floor and called EMS. Patient states the fluids and medication given by EMS have helped his current symptoms. Patient states he is currently not experiencing any nausea but during the interview he did vomit in a bag. Patient denies any use of alcohol, tobacco, or drugs. Patient further denies any associated abdominal pain, chest pain, headaches, or known history of bradycardia. \"    Restrictions/Precautions:  Restrictions/Precautions: General Precautions    Subjective  Chart Reviewed: Yes,Progress Notes,History and Physical,Orders  Patient assessed for rehabilitation services?: Yes  Family / Caregiver Present: No    Subjective: Patient was supine in bed upon OT arrival. He was aggreable to eval. Patient was A&O x4.     Pain:  Pain Assessment  Patient Currently in Pain: No    Vitals: Vitals not assessed per clinical judgement, see nursing flowsheet    Social/Functional History:  Lives With: Spouse  Type of Home: House  Home Layout: Two level  Home Access: Stairs to enter with rails  Entrance Stairs - Number of Steps: 6 steps  Entrance Stairs - Rails: Both  Home Equipment: Cane,Rolling walker,Grab bars (did not use AD prior to admit.)   Bathroom Shower/Tub: Walk-in shower  Bathroom Toilet: Standard  Bathroom Equipment: Grab bars in shower,Grab bars around toilet    Receives Help From: Family  ADL Assistance: Independent  Ambulation Assistance: Independent  Transfer Assistance: Independent    Active : No  Patient's  Info: per patient, doctor recommended driving restrictions  Mode of Transportation: Car  Occupation: Retired  Type of occupation: retired  and   Leisure & Hobbies: , patient still offers service. VISION:WFL    HEARING:  WFL    COGNITION: WFL    RANGE OF MOTION:  Right Upper Extremity: WFL  Left Upper Extremity:  WFL    STRENGTH:  Right Upper Extremity: WFL  Left Upper Extremity:  WFL    SENSATION:   WFL    ADL:   Grooming: Independent. did not have AD, no LOB during dynamic task  Lower Extremity Dressing: Independent. .  Toileting: Independent. did not have AD, no LOB during task  Toilet Transfer: Independent. did not have AD, no LOB during t/f. BALANCE:  Sitting Balance:  Independent. no LOB, able to maintain upright posture  Standing Balance: Independent. no LOB, able to maintain upright posture    BED MOBILITY:  Supine to Sit: Independent . TRANSFERS:  Sit to Stand:  Independent. no LOB  Stand to Sit: Independent. no LOB    FUNCTIONAL MOBILITY:  Assistive Device: None  Assist Level:  Supervision. Distance: To and from bathroom  Patient demo safe functional mobility with no AD or LOB. Activity Tolerance:  Patient tolerance of  treatment: good. Patient demo no SOB, LOB and was motivated to participate in OT. Assessment:  Assessment: Patient presented with functional ROM and muscle strength.  He demo safe ADL routine, has awareness of defecits and has good support system at home. OT to eval only and discharge. Performance deficits / Impairments: Decreased endurance,Decreased ADL status  Prognosis: Good  REQUIRES OT FOLLOW UP: No  No Skilled OT: Independent with ADL's,At baseline function,Safe to return home,No OT goals identified,Independent with functional mobility  Decision Making: Low Complexity    Treatment Initiated: Treatment and education initiated within context of evaluation. Evaluation time included review of current medical information, gathering information related to past medical, social and functional history, completion of standardized testing, formal and informal observation of tasks, assessment of data and development of plan of care and goals. Treatment time included skilled education and facilitation of tasks to increase safety and independence with ADL's for improved functional independence and quality of life. Discharge Recommendations:  Home with assist PRN    Patient Education:  OT Education: OT Role,Plan of Care,Precautions    Equipment Recommendations:  Equipment Needed: No    Plan:   .  See long-term goal time frame for expected duration of plan of care. If no long-term goals established, a short length of stay is anticipated. Goals:  Patient goals : return home  Short term goals  Time Frame for Short term goals: no OT goals identified due to at baseline       Following session, patient left in safe position with all fall risk precautions in place.

## 2022-04-07 NOTE — CARE COORDINATION
4/7/22, 7:51 AM EDT  DISCHARGE PLANNING EVALUATION:    Moriah Marin       Admitted: 4/6/2022/ 0555   Hospital day: 1   Location: -21/021-A Reason for admit: Gastroenteritis [K52.9]  Symptomatic bradycardia [R00.1]   PMH:  has a past medical history of Arthritis, Asthma, Blood circulation, collateral, CAD (coronary artery disease), COPD (chronic obstructive pulmonary disease) ([facility] [address] Diabetes mellitus ([facility] [address] GERD (gastroesophageal reflux disease), Hyperlipidemia, Hypertension, Other disorders of kidney and ureter in diseases classified elsewhere, and Psychiatric problem. Procedure: No.   Barriers to Discharge: To ER for emesis, weakness, lightheadedness. Orthostatic VS. PT/OT. PCP: Renetta Berger MD  Readmission Risk Score: 11.3 ( )%    Patient Goals/Plan/Treatment Preferences: Met with Barbour Meaghan today. He is from home with spouse and independent although he does allow his spouse to do the driving. No home services or DME. He has a PCP and no issues getting his medications. He has [employer] coverage for his medical needs. Transportation/Food Security/Housekeeping Addressed:  No issues identified. 4/7/22, 2:02 PM EDT    Patient goals/plan/ treatment preferences discussed by  and . Patient goals/plan/ treatment preferences reviewed with patient/ family. Patient/ family verbalize understanding of discharge plan and are in agreement with goal/plan/treatment preferences. Understanding was demonstrated using the teach back method. AVS provided by RN at time of discharge, which includes all necessary medical information pertaining to the patients current course of illness, treatment, post-discharge goals of care, and treatment preferences. IMM Letter  IMM Letter given to Patient/Family/Significant other/Guardian/POA/by[de-identified] Vandana GREENFIELD Case Manager. IMM Letter date given[de-identified] 04/07/22  IMM Letter time given[de-identified] 0934       Discharge order in place.  Pt plans return home with spouse and no new needs voiced. Upon pt preparing to leave this CM spoke with [facility] from [facility] who states [facility]. Jocelyn requests H&P and AVS.  These were faxed to [place] at [phone].

## 2022-04-07 NOTE — DISCHARGE SUMMARY
complained of some weakness. He ended up going to the bathroom where he states that he had a watery bowel movement. Denies any significant abdominal pain. He states then became very weak, diaphoretic, and complained of cold chills. He was unable to stand up due to his profound weakness. He called his wife to the bathroom. She was unable to lift him. He was able to slide down to the floor and attempted to crawl back out into the living room. He was unable to do so, his wife became increasingly concerned and called EMS for evaluation. On arrival, EMS noted patient to be bradycardic with a heart rate in the 40s. He received a total of 1 mg of atropine was brought to the hospital for evaluation.     The patient denied having any recent sick contacts, change in medications, change in his diet, chest pain, shortness of breath, or palpitations. Patient does admit to nausea, and complains of dry heaves. Denies any emesis. States he is feeling better at this time. 4/8: HR stable in the low 60's. Asymptomatic. Echo pending official read. Ambulating without assistance. No further recurrence of weakness or diaphoresis. Stable for discharge home. Recommend holding Coreg. Follow-up with PCP at [place]. Persistent bradycardia, evaluate for sinus node dysfunction possible PPM.  Patient with noted asymptomatic bacteriuria. Indication for antibiotic therapy at this time.     Physical Exam:-  Vitals:   Patient Vitals for the past 24 hrs:   BP Temp Temp src Pulse Resp SpO2   04/07/22 1136 (!) 155/70 97.9 °F (36.6 °C) Oral 63 18 94 %   04/07/22 0928 -- -- -- -- -- 98 %   04/07/22 0826 (!) 178/81 98.4 °F (36.9 °C) Oral 63 18 98 %   04/07/22 0316 138/66 98.2 °F (36.8 °C) Oral 58 18 99 %   04/06/22 2340 (!) 159/93 97.7 °F (36.5 °C) Oral 60 18 99 %   04/06/22 1930 (!) 156/79 98.3 °F (36.8 °C) Oral 61 18 99 %   04/06/22 1800 -- -- -- -- 16 --   04/06/22 1715 -- -- -- -- 16 --   04/06/22 1630 -- -- -- -- 16 --   04/06/22 1626 134/63 98.7 °F (37.1 °C) Oral 63 16 97 %   04/06/22 1500 -- -- -- -- 16 --   04/06/22 1415 -- -- -- -- 16 --     Weight:   Weight: 204 lb 12.8 oz (92.9 kg)   24 hour intake/output:     Intake/Output Summary (Last 24 hours) at 4/7/2022 1335  Last data filed at 4/7/2022 1027  Gross per 24 hour   Intake 730 ml   Output 400 ml   Net 330 ml       General appearance: No apparent distress, appears stated age and cooperative. HEENT: Normal cephalic, atraumatic without obvious deformity. Pupils equal, round, and reactive to light. Extra ocular muscles intact. Conjunctivae/corneas clear. Neck: Supple, with full range of motion. No jugular venous distention. Trachea midline. Respiratory: Mildly diminished breath sounds throughout all lung fields. No appreciable wheezes, rales, or rhonchi. Cardiovascular: Regular rate. Heart rate approximately 60-65. Regular rhythm with normal S1/S2 without murmurs, rubs or gallops. Abdomen: Soft, and nondistended. Normal bowel sounds. Mild tenderness to palpation in the right upper quadrant. Musculoskeletal:  No clubbing, cyanosis or edema bilaterally. Skin: Skin color, texture, turgor normal.  No rashes or lesions. Neurologic:  Neurovascularly intact without any focal sensory/motor deficits. Cranial nerves: II-XII intact, grossly non-focal.  Psychiatric: Alert and oriented, thought content appropriate, normal insight  Capillary Refill: Brisk,< 3 seconds   Peripheral Pulses: +2 palpable, equal bilaterally       Discharge Medications:-      Medication List      CHANGE how you take these medications    albuterol sulfate  (90 Base) MCG/ACT inhaler  What changed: Another medication with the same name was removed. Continue taking this medication, and follow the directions you see here. cilostazol 100 MG tablet  Commonly known as: PLETAL  What changed: Another medication with the same name was removed.  Continue taking this medication, and follow the directions you see here.     Lesly Fleming 3-6-9 FATTY ACIDS PO  What changed: Another medication with the same name was removed. Continue taking this medication, and follow the directions you see here. CONTINUE taking these medications    acetaminophen 325 MG tablet  Commonly known as: TYLENOL  Take 2 tablets by mouth every 4 hours as needed for Pain     amLODIPine 10 MG tablet  Commonly known as: NORVASC  Take 1 tablet by mouth daily     aspirin 81 MG EC tablet     atorvastatin 80 MG tablet  Commonly known as: LIPITOR  Take 1 tablet by mouth daily     clopidogrel 75 MG tablet  Commonly known as: PLAVIX     donepezil 10 MG tablet  Commonly known as: ARICEPT     guaiFENesin-codeine 100-10 MG/5ML syrup  Commonly known as: TUSSI-ORGANIDIN NR     hydroCHLOROthiazide 25 MG tablet  Commonly known as: HYDRODIURIL     isosorbide mononitrate 120 MG extended release tablet  Commonly known as: IMDUR  Take 1 tablet by mouth daily     nitroGLYCERIN 0.4 MG SL tablet  Commonly known as: NITROSTAT  Place 1 tablet under the tongue every 5 minutes as needed for Chest pain     pantoprazole 40 MG tablet  Commonly known as: PROTONIX  Take 1 tablet by mouth every morning (before breakfast)     * tiotropium-olodaterol 2.5-2.5 MCG/ACT Aers  Commonly known as: STIOLTO     * Stiolto Respimat 2.5-2.5 MCG/ACT Aers  Generic drug: tiotropium-olodaterol     traZODone 100 MG tablet  Commonly known as: DESYREL         * This list has 2 medication(s) that are the same as other medications prescribed for you. Read the directions carefully, and ask your doctor or other care provider to review them with you.             STOP taking these medications    carvedilol 12.5 MG tablet  Commonly known as: COREG     metFORMIN 500 MG extended release tablet  Commonly known as: Glucophage XR     metoprolol tartrate 25 MG tablet  Commonly known as: LOPRESSOR     spironolactone 50 MG tablet  Commonly known as: ALDACTONE     tiZANidine 4 MG tablet  Commonly known as: [facility] :-  Recent Results (from the past 72 hour(s))   EKG Emergency    Collection Time: 04/06/22  5:56 AM   Result Value Ref Range    Ventricular Rate 66 BPM    Atrial Rate 66 BPM    P-R Interval 216 ms    QRS Duration 118 ms    Q-T Interval 426 ms    QTc Calculation (Bazett) 446 ms    P Axis 36 degrees    R Axis -35 degrees    T Axis 76 degrees   CBC with Auto Differential    Collection Time: 04/06/22  6:18 AM   Result Value Ref Range    WBC 9.6 4.8 - 10.8 thou/mm3    RBC 4.49 (L) 4.70 - 6.10 mill/mm3    Hemoglobin 13.1 (L) 14.0 - 18.0 gm/dl    Hematocrit 41.4 (L) 42.0 - 52.0 %    MCV 92.2 80.0 - 94.0 fL    MCH 29.2 26.0 - 33.0 pg    MCHC 31.6 (L) 32.2 - 35.5 gm/dl    RDW-CV 12.2 11.5 - 14.5 %    RDW-SD 40.8 35.0 - 45.0 fL    Platelets 484 406 - 567 thou/mm3    MPV 12.8 (H) 9.4 - 12.4 fL    Seg Neutrophils 76.6 %    Lymphocytes 16.0 %    Monocytes 5.1 %    Eosinophils 1.6 %    Basophils 0.3 %    Immature Granulocytes 0.4 %    Segs Absolute 7.4 1.8 - 7.7 thou/mm3    Lymphocytes Absolute 1.5 1.0 - 4.8 thou/mm3    Monocytes Absolute 0.5 0.4 - 1.3 thou/mm3    Eosinophils Absolute 0.2 0.0 - 0.4 thou/mm3    Basophils Absolute 0.0 0.0 - 0.1 thou/mm3    Immature Grans (Abs) 0.04 0.00 - 0.07 thou/mm3    nRBC 0 /100 wbc   SPECIMEN REJECTION    Collection Time: 04/06/22  6:39 AM   Result Value Ref Range    Rejected Test cmp,trop,lipase     Reason for Rejection see below    Comprehensive Metabolic Panel w/ Reflex to MG    Collection Time: 04/06/22  7:00 AM   Result Value Ref Range    Glucose 265 (H) 70 - 108 mg/dL    CREATININE 0.8 0.4 - 1.2 mg/dL    BUN 14 7 - 22 mg/dL    Sodium 139 135 - 145 meq/L    Potassium reflex Magnesium 3.8 3.5 - 5.2 meq/L    Chloride 106 98 - 111 meq/L    CO2 22 (L) 23 - 33 meq/L    Calcium 8.5 8.5 - 10.5 mg/dL    AST 17 5 - 40 U/L    Alkaline Phosphatase 115 38 - 126 U/L    Total Protein 6.0 (L) 6.1 - 8.0 g/dL    Albumin 3.6 3.5 - 5.1 g/dL    Total Bilirubin 0.4 0.3 - 1.2 mg/dL    ALT 12 11 - 66 U/L   Troponin    Collection Time: 04/06/22  7:00 AM   Result Value Ref Range    Troponin T < 0.010 ng/ml   Brain Natriuretic Peptide    Collection Time: 04/06/22  7:00 AM   Result Value Ref Range    Pro-.2 0.0 - 900.0 pg/mL   Lipase    Collection Time: 04/06/22  7:00 AM   Result Value Ref Range    Lipase 32.9 5.6 - 51.3 U/L   Anion Gap    Collection Time: 04/06/22  7:00 AM   Result Value Ref Range    Anion Gap 11.0 8.0 - 16.0 meq/L   Glomerular Filtration Rate, Estimated    Collection Time: 04/06/22  7:00 AM   Result Value Ref Range    Est, Glom Filt Rate >90 ml/min/1.73m2   Osmolality    Collection Time: 04/06/22  7:00 AM   Result Value Ref Range    Osmolality Calc 287.3 275.0 - 300.0 mOsmol/kg   TSH    Collection Time: 04/06/22  7:00 AM   Result Value Ref Range    TSH 0.587 0.400 - 4.200 uIU/mL   Magnesium    Collection Time: 04/06/22  7:00 AM   Result Value Ref Range    Magnesium 1.9 1.6 - 2.4 mg/dL   Lactate, Sepsis    Collection Time: 04/06/22  7:21 AM   Result Value Ref Range    Lactic Acid, Sepsis 1.2 0.5 - 1.9 mmol/L   EKG 12 lead    Collection Time: 04/06/22  9:08 AM   Result Value Ref Range    Ventricular Rate 54 BPM    Atrial Rate 54 BPM    P-R Interval 216 ms    QRS Duration 108 ms    Q-T Interval 474 ms    QTc Calculation (Bazett) 449 ms    P Axis 31 degrees    R Axis -35 degrees    T Axis -85 degrees   Troponin    Collection Time: 04/06/22 10:39 AM   Result Value Ref Range    Troponin T < 0.010 ng/ml   POCT Glucose    Collection Time: 04/06/22 11:56 AM   Result Value Ref Range    POC Glucose 269 (H) 70 - 108 mg/dl   Urine with Reflexed Micro    Collection Time: 04/06/22  2:20 PM   Result Value Ref Range    Glucose, Ur NEGATIVE NEGATIVE mg/dl    Bilirubin Urine NEGATIVE NEGATIVE    Ketones, Urine TRACE (A) NEGATIVE    Specific Gravity, Urine 1.022 1.002 - 1.030    Blood, Urine TRACE (A) NEGATIVE    pH, UA 5.0 5.0 - 9.0    Protein, UA TRACE (A) NEGATIVE    Urobilinogen, Urine 1.0 0.0 - 1.0 eu/dl Nitrite, Urine NEGATIVE NEGATIVE    Leukocyte Esterase, Urine MODERATE (A) NEGATIVE    Color, UA YELLOW STRAW-YELLOW    Character, Urine CLOUDY (A) CLEAR-SL CLOUD    RBC, UA 0-2 0-2/hpf /hpf    WBC, UA > 100 0-4/hpf /hpf    Epithelial Cells, UA NONE SEEN 3-5/hpf /hpf    Bacteria, UA MANY FEW/NONE SEEN /hpf    Casts UA >15 HYALINE NONE SEEN /lpf    Crystals, UA NONE SEEN NONE SEEN    Renal Epithelial, UA NONE SEEN NONE SEEN    Yeast, UA NONE SEEN NONE SEEN    CASTS 2 NONE SEEN NONE SEEN /lpf    MISCELLANEOUS 2 NONE SEEN    Culture, Reflexed, Urine    Collection Time: 04/06/22  2:20 PM    Specimen: Urine, clean catch   Result Value Ref Range    Organism gram negative bacilli (A)     Urine Culture Reflex [place] count: >100,000 CFU/mL     POCT Glucose    Collection Time: 04/06/22  5:06 PM   Result Value Ref Range    POC Glucose 72 70 - 108 mg/dl   POCT Glucose    Collection Time: 04/06/22  7:35 PM   Result Value Ref Range    POC Glucose 143 (H) 70 - 108 mg/dl   POCT Glucose    Collection Time: 04/07/22  8:02 AM   Result Value Ref Range    POC Glucose 106 70 - 108 mg/dl   Basic Metabolic Panel w/ Reflex to MG    Collection Time: 04/07/22 11:05 AM   Result Value Ref Range    Sodium 141 135 - 145 meq/L    Potassium reflex Magnesium 3.7 3.5 - 5.2 meq/L    Chloride 105 98 - 111 meq/L    CO2 24 23 - 33 meq/L    Glucose 133 (H) 70 - 108 mg/dL    BUN 13 7 - 22 mg/dL    CREATININE 0.9 0.4 - 1.2 mg/dL    Calcium 9.0 8.5 - 10.5 mg/dL   CBC with Auto Differential    Collection Time: 04/07/22 11:05 AM   Result Value Ref Range    WBC 8.2 4.8 - 10.8 thou/mm3    RBC 4.51 (L) 4.70 - 6.10 mill/mm3    Hemoglobin 13.0 (L) 14.0 - 18.0 gm/dl    Hematocrit 41.8 (L) 42.0 - 52.0 %    MCV 92.7 80.0 - 94.0 fL    MCH 28.8 26.0 - 33.0 pg    MCHC 31.1 (L) 32.2 - 35.5 gm/dl    RDW-CV 12.4 11.5 - 14.5 %    RDW-SD 42.2 35.0 - 45.0 fL    Platelets 939 205 - 521 thou/mm3    MPV 12.4 9.4 - 12.4 fL    Seg Neutrophils 68.0 %    Lymphocytes 24.0 % Monocytes 5.8 %    Eosinophils 1.5 %    Basophils 0.5 %    Immature Granulocytes 0.2 %    Segs Absolute 5.6 1.8 - 7.7 thou/mm3    Lymphocytes Absolute 2.0 1.0 - 4.8 thou/mm3    Monocytes Absolute 0.5 0.4 - 1.3 thou/mm3    Eosinophils Absolute 0.1 0.0 - 0.4 thou/mm3    Basophils Absolute 0.0 0.0 - 0.1 thou/mm3    Immature Grans (Abs) 0.02 0.00 - 0.07 thou/mm3    nRBC 0 /100 wbc   Anion Gap    Collection Time: 04/07/22 11:05 AM   Result Value Ref Range    Anion Gap 12.0 8.0 - 16.0 meq/L   Glomerular Filtration Rate, Estimated    Collection Time: 04/07/22 11:05 AM   Result Value Ref Range    Est, Glom Filt Rate >90 ml/min/1.73m2   POCT Glucose    Collection Time: 04/07/22 12:05 PM   Result Value Ref Range    POC Glucose 133 (H) 70 - 108 mg/dl        Microbiology:    Blood culture #1: No results found for: BC    Blood culture #2:No results found for: BLOODCULT2    Organism:  No results found for: LABGRAM    MRSA culture only:No results found for: [facility]    Urine culture:   Lab Results   Component Value Date    LABURIN No growth-preliminary  No growth   11/06/2018     Lab Results   Component Value Date    ORG gram negative bacilli 04/06/2022        Respiratory culture: No results found for: CULTRESP    Aerobic and Anaerobic :  No results found for: LABAERO  No results found for: LABANAE    Urinalysis:      Lab Results   Component Value Date    NITRU NEGATIVE 04/06/2022    WBCUA > 100 04/06/2022    BACTERIA MANY 04/06/2022    RBCUA 0-2 04/06/2022    BLOODU TRACE 04/06/2022    SPECGRAV 1.015 11/06/2018    GLUCOSEU NEGATIVE 04/06/2022       Radiology:-  No results found.      Follow-up scheduled after discharge :-    in 5-7 days with Uzma Chen MD      Consultations during this hospital stay:-  [] NONE [] Cardiology  [] Nephrology  [] Hemo onco   [] GI   [] ID  [] Endocrine  [] Pulm    [] Neuro    [] Psych   [] Urology  [] ENT   [] G SURGERY   []Ortho    []CV surg    [] Palliative  [] Hospice [] Pain management   []    []TCU   [x] PT/OT  OTHERS:-    Disposition: home  Condition at Discharge: Stable    Time Spent:- 45 minutes    Electronically signed by Jaime López DO on 4/7/22 at 1:35 PM EDT   Discharging Hospitalist

## 2022-06-16 ENCOUNTER — HOSPITAL ENCOUNTER (OUTPATIENT)
Age: 72
Discharge: HOME | End: 2022-06-16

## 2022-06-16 LAB
ANION GAP SERPL CALCULATED.3IONS-SCNC: 9 MEQ/L (ref 8–16)
BACTERIA: ABNORMAL /HPF
BILIRUBIN URINE: NEGATIVE
BLOOD, URINE: NEGATIVE
BUN BLDV-MCNC: 16 MG/DL (ref 7–22)
CALCIUM SERPL-MCNC: 9.5 MG/DL (ref 8.5–10.5)
CASTS 2: ABNORMAL /LPF
CASTS UA: ABNORMAL /LPF
CHARACTER, URINE: ABNORMAL
CHLORIDE BLD-SCNC: 100 MEQ/L (ref 98–111)
CO2: 27 MEQ/L (ref 23–33)
COLOR: ABNORMAL
CREAT SERPL-MCNC: 0.8 MG/DL (ref 0.4–1.2)
CRYSTALS, UA: ABNORMAL
EPITHELIAL CELLS, UA: ABNORMAL /HPF
ERYTHROCYTE [DISTWIDTH] IN BLOOD BY AUTOMATED COUNT: 12.5 % (ref 11.5–14.5)
ERYTHROCYTE [DISTWIDTH] IN BLOOD BY AUTOMATED COUNT: 40.8 FL (ref 35–45)
GFR SERPL CREATININE-BSD FRML MDRD: > 90 ML/MIN/1.73M2
GLUCOSE BLD-MCNC: 159 MG/DL (ref 70–108)
GLUCOSE URINE: NEGATIVE MG/DL
HCT VFR BLD CALC: 42.9 % (ref 42–52)
HEMOGLOBIN: 13.8 GM/DL (ref 14–18)
KETONES, URINE: ABNORMAL
LEUKOCYTE ESTERASE, URINE: ABNORMAL
MCH RBC QN AUTO: 28.7 PG (ref 26–33)
MCHC RBC AUTO-ENTMCNC: 32.2 GM/DL (ref 32.2–35.5)
MCV RBC AUTO: 89.2 FL (ref 80–94)
MISCELLANEOUS 2: ABNORMAL
NITRITE, URINE: NEGATIVE
PH UA: 6 (ref 5–9)
PLATELET # BLD: 226 THOU/MM3 (ref 130–400)
PMV BLD AUTO: 12.2 FL (ref 9.4–12.4)
POTASSIUM SERPL-SCNC: 4 MEQ/L (ref 3.5–5.2)
PROTEIN UA: 30
RBC # BLD: 4.81 MILL/MM3 (ref 4.7–6.1)
RBC URINE: ABNORMAL /HPF
RENAL EPITHELIAL, UA: ABNORMAL
SODIUM BLD-SCNC: 136 MEQ/L (ref 135–145)
SPECIFIC GRAVITY, URINE: 1.02 (ref 1–1.03)
UROBILINOGEN, URINE: 2 EU/DL (ref 0–1)
WBC # BLD: 10.5 THOU/MM3 (ref 4.8–10.8)
WBC UA: > 100 /HPF
YEAST: ABNORMAL

## 2022-06-16 PROCEDURE — 81001 URINALYSIS AUTO W/SCOPE: CPT

## 2022-06-16 PROCEDURE — 87086 URINE CULTURE/COLONY COUNT: CPT

## 2022-06-16 PROCEDURE — 85027 COMPLETE CBC AUTOMATED: CPT

## 2022-06-16 PROCEDURE — 87186 SC STD MICRODIL/AGAR DIL: CPT

## 2022-06-16 PROCEDURE — 87081 CULTURE SCREEN ONLY: CPT

## 2022-06-16 PROCEDURE — 80048 BASIC METABOLIC PNL TOTAL CA: CPT

## 2022-06-16 PROCEDURE — 36415 COLL VENOUS BLD VENIPUNCTURE: CPT

## 2022-06-16 PROCEDURE — 87077 CULTURE AEROBIC IDENTIFY: CPT

## 2022-06-17 LAB
ORGANISM: ABNORMAL
URINE CULTURE REFLEX: ABNORMAL

## 2022-06-18 LAB — MRSA SCREEN: NORMAL

## 2022-07-14 RX ORDER — CARVEDILOL 12.5 MG/1
6.25 TABLET ORAL 2 TIMES DAILY WITH MEALS
COMMUNITY

## 2022-07-14 NOTE — PROGRESS NOTES
Follow all instructions given by your physician    NPO after midnight   Sips of water am of surgery with allowed medications  Bring insurance info and 's license  Wear comfortable clean clothing  No jewelry or contact lenses to be worn day of surgery  No glue on dentures morning of surgery;you will be asked to remove them for surgery. Case for glasses. Shower night before and morning of surgery with a liquid antibacterial soap, dry with fresh clean towel; no lotions, creams or powder. Clean sheets and pillow case on bed night before surgery  Bring medications in original bottles  Bring CPAP/BIPAP machine if you have one ( you may be charged if one is needed in recovery room )   needed at discharge and someone over 18 to stay with you for 24 hours overnight (surgery may be cancelled if you don't have this)  Report to [facility] on 2nd floor  If you would become ill prior to surgery, please call the surgeon  May have a visitor with you, we request that you limit to 2 visitors in pre-op area  Please bring and wear mask  Call [phone] for any questions  Covid questionnaire Complete; Patient negative for symptoms or exposure. See documentation.   Bring walker

## 2022-07-21 ENCOUNTER — APPOINTMENT (OUTPATIENT)
Dept: GENERAL RADIOLOGY | Age: 72
DRG: 470 | End: 2022-07-21
Attending: ORTHOPAEDIC SURGERY
Payer: OTHER GOVERNMENT

## 2022-07-21 ENCOUNTER — ANESTHESIA (OUTPATIENT)
Dept: OPERATING ROOM | Age: 72
DRG: 470 | End: 2022-07-21
Payer: OTHER GOVERNMENT

## 2022-07-21 ENCOUNTER — HOSPITAL ENCOUNTER (INPATIENT)
Age: 72
LOS: 1 days | Discharge: HOME HEALTH | DRG: 470 | End: 2022-07-22
Attending: ORTHOPAEDIC SURGERY | Admitting: ORTHOPAEDIC SURGERY
Payer: OTHER GOVERNMENT

## 2022-07-21 ENCOUNTER — ANESTHESIA EVENT (OUTPATIENT)
Dept: OPERATING ROOM | Age: 72
DRG: 470 | End: 2022-07-21
Payer: OTHER GOVERNMENT

## 2022-07-21 DIAGNOSIS — G89.18 POST-OPERATIVE PAIN: Primary | ICD-10-CM

## 2022-07-21 PROBLEM — M16.11 PRIMARY OSTEOARTHRITIS OF RIGHT HIP: Status: ACTIVE | Noted: 2022-07-21

## 2022-07-21 PROBLEM — M16.11 OSTEOARTHRITIS OF RIGHT HIP, UNSPECIFIED OSTEOARTHRITIS TYPE: Status: ACTIVE | Noted: 2022-07-21

## 2022-07-21 LAB
ANION GAP SERPL CALCULATED.3IONS-SCNC: 11 MEQ/L (ref 8–16)
BASOPHILS # BLD: 0.1 %
BASOPHILS ABSOLUTE: 0 THOU/MM3 (ref 0–0.1)
BUN BLDV-MCNC: 13 MG/DL (ref 7–22)
CALCIUM SERPL-MCNC: 8.5 MG/DL (ref 8.5–10.5)
CHLORIDE BLD-SCNC: 106 MEQ/L (ref 98–111)
CO2: 19 MEQ/L (ref 23–33)
CREAT SERPL-MCNC: 0.8 MG/DL (ref 0.4–1.2)
EOSINOPHIL # BLD: 0 %
EOSINOPHILS ABSOLUTE: 0 THOU/MM3 (ref 0–0.4)
ERYTHROCYTE [DISTWIDTH] IN BLOOD BY AUTOMATED COUNT: 12.4 % (ref 11.5–14.5)
ERYTHROCYTE [DISTWIDTH] IN BLOOD BY AUTOMATED COUNT: 42.4 FL (ref 35–45)
GFR SERPL CREATININE-BSD FRML MDRD: > 90 ML/MIN/1.73M2
GLUCOSE BLD-MCNC: 130 MG/DL (ref 70–108)
GLUCOSE BLD-MCNC: 282 MG/DL (ref 70–108)
HCT VFR BLD CALC: 41.1 % (ref 42–52)
HEMOGLOBIN: 12.7 GM/DL (ref 14–18)
IMMATURE GRANS (ABS): 0.06 THOU/MM3 (ref 0–0.07)
IMMATURE GRANULOCYTES: 0.5 %
LYMPHOCYTES # BLD: 6 %
LYMPHOCYTES ABSOLUTE: 0.7 THOU/MM3 (ref 1–4.8)
MCH RBC QN AUTO: 28.8 PG (ref 26–33)
MCHC RBC AUTO-ENTMCNC: 30.9 GM/DL (ref 32.2–35.5)
MCV RBC AUTO: 93.2 FL (ref 80–94)
MONOCYTES # BLD: 0.8 %
MONOCYTES ABSOLUTE: 0.1 THOU/MM3 (ref 0.4–1.3)
NUCLEATED RED BLOOD CELLS: 0 /100 WBC
PLATELET # BLD: 173 THOU/MM3 (ref 130–400)
PMV BLD AUTO: 13 FL (ref 9.4–12.4)
POTASSIUM REFLEX MAGNESIUM: 4.5 MEQ/L (ref 3.5–5.2)
RBC # BLD: 4.41 MILL/MM3 (ref 4.7–6.1)
SEG NEUTROPHILS: 92.6 %
SEGMENTED NEUTROPHILS ABSOLUTE COUNT: 11 THOU/MM3 (ref 1.8–7.7)
SODIUM BLD-SCNC: 136 MEQ/L (ref 135–145)
WBC # BLD: 11.9 THOU/MM3 (ref 4.8–10.8)

## 2022-07-21 PROCEDURE — 73502 X-RAY EXAM HIP UNI 2-3 VIEWS: CPT

## 2022-07-21 PROCEDURE — 94760 N-INVAS EAR/PLS OXIMETRY 1: CPT

## 2022-07-21 PROCEDURE — C1776 JOINT DEVICE (IMPLANTABLE): HCPCS | Performed by: ORTHOPAEDIC SURGERY

## 2022-07-21 PROCEDURE — 6370000000 HC RX 637 (ALT 250 FOR IP): Performed by: STUDENT IN AN ORGANIZED HEALTH CARE EDUCATION/TRAINING PROGRAM

## 2022-07-21 PROCEDURE — 7100000011 HC PHASE II RECOVERY - ADDTL 15 MIN: Performed by: ORTHOPAEDIC SURGERY

## 2022-07-21 PROCEDURE — 6360000002 HC RX W HCPCS: Performed by: NURSE PRACTITIONER

## 2022-07-21 PROCEDURE — 94640 AIRWAY INHALATION TREATMENT: CPT

## 2022-07-21 PROCEDURE — 2500000003 HC RX 250 WO HCPCS: Performed by: NURSE ANESTHETIST, CERTIFIED REGISTERED

## 2022-07-21 PROCEDURE — 6360000002 HC RX W HCPCS: Performed by: NURSE ANESTHETIST, CERTIFIED REGISTERED

## 2022-07-21 PROCEDURE — 3700000001 HC ADD 15 MINUTES (ANESTHESIA): Performed by: ORTHOPAEDIC SURGERY

## 2022-07-21 PROCEDURE — 1200000000 HC SEMI PRIVATE

## 2022-07-21 PROCEDURE — 6370000000 HC RX 637 (ALT 250 FOR IP): Performed by: NURSE PRACTITIONER

## 2022-07-21 PROCEDURE — 82948 REAGENT STRIP/BLOOD GLUCOSE: CPT

## 2022-07-21 PROCEDURE — 3700000000 HC ANESTHESIA ATTENDED CARE: Performed by: ORTHOPAEDIC SURGERY

## 2022-07-21 PROCEDURE — 3600000005 HC SURGERY LEVEL 5 BASE: Performed by: ORTHOPAEDIC SURGERY

## 2022-07-21 PROCEDURE — 7100000000 HC PACU RECOVERY - FIRST 15 MIN: Performed by: ORTHOPAEDIC SURGERY

## 2022-07-21 PROCEDURE — 80048 BASIC METABOLIC PNL TOTAL CA: CPT

## 2022-07-21 PROCEDURE — 99255 IP/OBS CONSLTJ NEW/EST HI 80: CPT | Performed by: PHYSICIAN ASSISTANT

## 2022-07-21 PROCEDURE — 2500000003 HC RX 250 WO HCPCS: Performed by: ORTHOPAEDIC SURGERY

## 2022-07-21 PROCEDURE — 6370000000 HC RX 637 (ALT 250 FOR IP): Performed by: PHYSICIAN ASSISTANT

## 2022-07-21 PROCEDURE — 3600000015 HC SURGERY LEVEL 5 ADDTL 15MIN: Performed by: ORTHOPAEDIC SURGERY

## 2022-07-21 PROCEDURE — 85025 COMPLETE CBC W/AUTO DIFF WBC: CPT

## 2022-07-21 PROCEDURE — 36415 COLL VENOUS BLD VENIPUNCTURE: CPT

## 2022-07-21 PROCEDURE — 2580000003 HC RX 258: Performed by: ORTHOPAEDIC SURGERY

## 2022-07-21 PROCEDURE — 0SR90JA REPLACEMENT OF RIGHT HIP JOINT WITH SYNTHETIC SUBSTITUTE, UNCEMENTED, OPEN APPROACH: ICD-10-PCS | Performed by: ORTHOPAEDIC SURGERY

## 2022-07-21 PROCEDURE — 2580000003 HC RX 258: Performed by: NURSE PRACTITIONER

## 2022-07-21 PROCEDURE — 6360000002 HC RX W HCPCS: Performed by: ORTHOPAEDIC SURGERY

## 2022-07-21 PROCEDURE — 7100000010 HC PHASE II RECOVERY - FIRST 15 MIN: Performed by: ORTHOPAEDIC SURGERY

## 2022-07-21 PROCEDURE — 7100000001 HC PACU RECOVERY - ADDTL 15 MIN: Performed by: ORTHOPAEDIC SURGERY

## 2022-07-21 PROCEDURE — 2709999900 HC NON-CHARGEABLE SUPPLY: Performed by: ORTHOPAEDIC SURGERY

## 2022-07-21 DEVICE — IMPLANTABLE DEVICE: Type: IMPLANTABLE DEVICE | Site: HIP | Status: FUNCTIONAL

## 2022-07-21 DEVICE — LINER ACET NEUT 36X52 MM HIP GRP 5 XLE ALTEON: Type: IMPLANTABLE DEVICE | Site: HIP | Status: FUNCTIONAL

## 2022-07-21 DEVICE — HEAD FEM OD36MM +0MM 12/14 MTL ON MTL PRI TAPR HIP CO CHROM: Type: IMPLANTABLE DEVICE | Site: HIP | Status: FUNCTIONAL

## 2022-07-21 RX ORDER — MORPHINE SULFATE 2 MG/ML
4 INJECTION, SOLUTION INTRAMUSCULAR; INTRAVENOUS
Status: DISCONTINUED | OUTPATIENT
Start: 2022-07-21 | End: 2022-07-22 | Stop reason: HOSPADM

## 2022-07-21 RX ORDER — CLOPIDOGREL BISULFATE 75 MG/1
75 TABLET ORAL DAILY
Status: DISCONTINUED | OUTPATIENT
Start: 2022-07-22 | End: 2022-07-22 | Stop reason: HOSPADM

## 2022-07-21 RX ORDER — TRANEXAMIC ACID 100 MG/ML
INJECTION, SOLUTION INTRAVENOUS PRN
Status: DISCONTINUED | OUTPATIENT
Start: 2022-07-21 | End: 2022-07-21 | Stop reason: SDUPTHER

## 2022-07-21 RX ORDER — SODIUM CHLORIDE 9 MG/ML
INJECTION, SOLUTION INTRAVENOUS PRN
Status: DISCONTINUED | OUTPATIENT
Start: 2022-07-21 | End: 2022-07-21 | Stop reason: HOSPADM

## 2022-07-21 RX ORDER — ONDANSETRON 2 MG/ML
4 INJECTION INTRAMUSCULAR; INTRAVENOUS
Status: DISCONTINUED | OUTPATIENT
Start: 2022-07-21 | End: 2022-07-21 | Stop reason: HOSPADM

## 2022-07-21 RX ORDER — DEXAMETHASONE SODIUM PHOSPHATE 10 MG/ML
INJECTION, EMULSION INTRAMUSCULAR; INTRAVENOUS PRN
Status: DISCONTINUED | OUTPATIENT
Start: 2022-07-21 | End: 2022-07-21 | Stop reason: SDUPTHER

## 2022-07-21 RX ORDER — SODIUM CHLORIDE 9 MG/ML
INJECTION, SOLUTION INTRAVENOUS CONTINUOUS
Status: DISCONTINUED | OUTPATIENT
Start: 2022-07-21 | End: 2022-07-22 | Stop reason: HOSPADM

## 2022-07-21 RX ORDER — PANTOPRAZOLE SODIUM 40 MG/1
40 TABLET, DELAYED RELEASE ORAL
Status: DISCONTINUED | OUTPATIENT
Start: 2022-07-22 | End: 2022-07-22 | Stop reason: HOSPADM

## 2022-07-21 RX ORDER — SODIUM CHLORIDE 0.9 % (FLUSH) 0.9 %
5-40 SYRINGE (ML) INJECTION PRN
Status: DISCONTINUED | OUTPATIENT
Start: 2022-07-21 | End: 2022-07-21 | Stop reason: HOSPADM

## 2022-07-21 RX ORDER — VANCOMYCIN HYDROCHLORIDE 1 G/20ML
INJECTION, POWDER, LYOPHILIZED, FOR SOLUTION INTRAVENOUS PRN
Status: DISCONTINUED | OUTPATIENT
Start: 2022-07-21 | End: 2022-07-21 | Stop reason: ALTCHOICE

## 2022-07-21 RX ORDER — FENTANYL CITRATE 50 UG/ML
50 INJECTION, SOLUTION INTRAMUSCULAR; INTRAVENOUS EVERY 5 MIN PRN
Status: DISCONTINUED | OUTPATIENT
Start: 2022-07-21 | End: 2022-07-21 | Stop reason: HOSPADM

## 2022-07-21 RX ORDER — ROCURONIUM BROMIDE 10 MG/ML
INJECTION, SOLUTION INTRAVENOUS PRN
Status: DISCONTINUED | OUTPATIENT
Start: 2022-07-21 | End: 2022-07-21 | Stop reason: SDUPTHER

## 2022-07-21 RX ORDER — ONDANSETRON 2 MG/ML
INJECTION INTRAMUSCULAR; INTRAVENOUS PRN
Status: DISCONTINUED | OUTPATIENT
Start: 2022-07-21 | End: 2022-07-21 | Stop reason: SDUPTHER

## 2022-07-21 RX ORDER — HYDROCODONE BITARTRATE AND ACETAMINOPHEN 5; 325 MG/1; MG/1
1 TABLET ORAL EVERY 4 HOURS PRN
Status: DISCONTINUED | OUTPATIENT
Start: 2022-07-21 | End: 2022-07-22 | Stop reason: HOSPADM

## 2022-07-21 RX ORDER — CILOSTAZOL 100 MG/1
100 TABLET ORAL 2 TIMES DAILY
Status: DISCONTINUED | OUTPATIENT
Start: 2022-07-21 | End: 2022-07-22 | Stop reason: HOSPADM

## 2022-07-21 RX ORDER — FENTANYL CITRATE 50 UG/ML
INJECTION, SOLUTION INTRAMUSCULAR; INTRAVENOUS PRN
Status: DISCONTINUED | OUTPATIENT
Start: 2022-07-21 | End: 2022-07-21 | Stop reason: SDUPTHER

## 2022-07-21 RX ORDER — TRAZODONE HYDROCHLORIDE 100 MG/1
100 TABLET ORAL NIGHTLY
Status: DISCONTINUED | OUTPATIENT
Start: 2022-07-21 | End: 2022-07-22 | Stop reason: HOSPADM

## 2022-07-21 RX ORDER — ACETAMINOPHEN 325 MG/1
650 TABLET ORAL EVERY 4 HOURS PRN
Status: DISCONTINUED | OUTPATIENT
Start: 2022-07-21 | End: 2022-07-22 | Stop reason: HOSPADM

## 2022-07-21 RX ORDER — SODIUM CHLORIDE 0.9 % (FLUSH) 0.9 %
5-40 SYRINGE (ML) INJECTION EVERY 12 HOURS SCHEDULED
Status: DISCONTINUED | OUTPATIENT
Start: 2022-07-21 | End: 2022-07-21 | Stop reason: HOSPADM

## 2022-07-21 RX ORDER — LIDOCAINE HYDROCHLORIDE 20 MG/ML
INJECTION, SOLUTION INTRAVENOUS PRN
Status: DISCONTINUED | OUTPATIENT
Start: 2022-07-21 | End: 2022-07-21 | Stop reason: SDUPTHER

## 2022-07-21 RX ORDER — HYDROCHLOROTHIAZIDE 25 MG/1
25 TABLET ORAL DAILY
Status: DISCONTINUED | OUTPATIENT
Start: 2022-07-21 | End: 2022-07-22 | Stop reason: HOSPADM

## 2022-07-21 RX ORDER — HYDROCODONE BITARTRATE AND ACETAMINOPHEN 5; 325 MG/1; MG/1
2 TABLET ORAL EVERY 4 HOURS PRN
Status: DISCONTINUED | OUTPATIENT
Start: 2022-07-21 | End: 2022-07-22 | Stop reason: HOSPADM

## 2022-07-21 RX ORDER — INSULIN GLARGINE 100 [IU]/ML
0.25 INJECTION, SOLUTION SUBCUTANEOUS NIGHTLY
Status: DISCONTINUED | OUTPATIENT
Start: 2022-07-21 | End: 2022-07-22 | Stop reason: HOSPADM

## 2022-07-21 RX ORDER — IPRATROPIUM BROMIDE AND ALBUTEROL SULFATE 2.5; .5 MG/3ML; MG/3ML
1 SOLUTION RESPIRATORY (INHALATION) ONCE
Status: COMPLETED | OUTPATIENT
Start: 2022-07-21 | End: 2022-07-21

## 2022-07-21 RX ORDER — DOCUSATE SODIUM 100 MG/1
100 CAPSULE, LIQUID FILLED ORAL DAILY
Status: DISCONTINUED | OUTPATIENT
Start: 2022-07-21 | End: 2022-07-22 | Stop reason: HOSPADM

## 2022-07-21 RX ORDER — NITROGLYCERIN 0.4 MG/1
0.4 TABLET SUBLINGUAL EVERY 5 MIN PRN
Status: DISCONTINUED | OUTPATIENT
Start: 2022-07-21 | End: 2022-07-22 | Stop reason: HOSPADM

## 2022-07-21 RX ORDER — HYDRALAZINE HYDROCHLORIDE 20 MG/ML
10 INJECTION INTRAMUSCULAR; INTRAVENOUS
Status: DISCONTINUED | OUTPATIENT
Start: 2022-07-21 | End: 2022-07-21 | Stop reason: HOSPADM

## 2022-07-21 RX ORDER — AMLODIPINE BESYLATE 10 MG/1
10 TABLET ORAL DAILY
Status: DISCONTINUED | OUTPATIENT
Start: 2022-07-22 | End: 2022-07-22 | Stop reason: HOSPADM

## 2022-07-21 RX ORDER — MORPHINE SULFATE 2 MG/ML
2 INJECTION, SOLUTION INTRAMUSCULAR; INTRAVENOUS
Status: DISCONTINUED | OUTPATIENT
Start: 2022-07-21 | End: 2022-07-22 | Stop reason: HOSPADM

## 2022-07-21 RX ORDER — ATORVASTATIN CALCIUM 80 MG/1
80 TABLET, FILM COATED ORAL DAILY
Status: DISCONTINUED | OUTPATIENT
Start: 2022-07-21 | End: 2022-07-22 | Stop reason: HOSPADM

## 2022-07-21 RX ORDER — CARVEDILOL 6.25 MG/1
6.25 TABLET ORAL 2 TIMES DAILY WITH MEALS
Status: DISCONTINUED | OUTPATIENT
Start: 2022-07-21 | End: 2022-07-21

## 2022-07-21 RX ORDER — INSULIN LISPRO 100 [IU]/ML
0-4 INJECTION, SOLUTION INTRAVENOUS; SUBCUTANEOUS
Status: DISCONTINUED | OUTPATIENT
Start: 2022-07-22 | End: 2022-07-22 | Stop reason: HOSPADM

## 2022-07-21 RX ORDER — KETOROLAC TROMETHAMINE 30 MG/ML
15 INJECTION, SOLUTION INTRAMUSCULAR; INTRAVENOUS EVERY 6 HOURS
Status: DISCONTINUED | OUTPATIENT
Start: 2022-07-21 | End: 2022-07-22 | Stop reason: HOSPADM

## 2022-07-21 RX ORDER — ASPIRIN 81 MG/1
81 TABLET ORAL DAILY
Status: DISCONTINUED | OUTPATIENT
Start: 2022-07-22 | End: 2022-07-22 | Stop reason: HOSPADM

## 2022-07-21 RX ORDER — DEXTROSE MONOHYDRATE 100 MG/ML
INJECTION, SOLUTION INTRAVENOUS CONTINUOUS PRN
Status: DISCONTINUED | OUTPATIENT
Start: 2022-07-21 | End: 2022-07-22 | Stop reason: HOSPADM

## 2022-07-21 RX ORDER — DIPHENHYDRAMINE HYDROCHLORIDE 50 MG/ML
12.5 INJECTION INTRAMUSCULAR; INTRAVENOUS
Status: DISCONTINUED | OUTPATIENT
Start: 2022-07-21 | End: 2022-07-21 | Stop reason: HOSPADM

## 2022-07-21 RX ORDER — SODIUM CHLORIDE 9 MG/ML
INJECTION, SOLUTION INTRAVENOUS CONTINUOUS
Status: DISCONTINUED | OUTPATIENT
Start: 2022-07-21 | End: 2022-07-21 | Stop reason: HOSPADM

## 2022-07-21 RX ORDER — DONEPEZIL HYDROCHLORIDE 5 MG/1
5 TABLET, FILM COATED ORAL NIGHTLY
Status: DISCONTINUED | OUTPATIENT
Start: 2022-07-21 | End: 2022-07-22 | Stop reason: HOSPADM

## 2022-07-21 RX ORDER — CODEINE PHOSPHATE AND GUAIFENESIN 10; 100 MG/5ML; MG/5ML
5 SOLUTION ORAL EVERY 4 HOURS PRN
Status: DISCONTINUED | OUTPATIENT
Start: 2022-07-21 | End: 2022-07-22 | Stop reason: HOSPADM

## 2022-07-21 RX ORDER — CYCLOBENZAPRINE HCL 10 MG
10 TABLET ORAL 3 TIMES DAILY PRN
Status: DISCONTINUED | OUTPATIENT
Start: 2022-07-21 | End: 2022-07-22 | Stop reason: HOSPADM

## 2022-07-21 RX ORDER — INSULIN LISPRO 100 [IU]/ML
0-4 INJECTION, SOLUTION INTRAVENOUS; SUBCUTANEOUS NIGHTLY
Status: DISCONTINUED | OUTPATIENT
Start: 2022-07-21 | End: 2022-07-22 | Stop reason: HOSPADM

## 2022-07-21 RX ORDER — PROPOFOL 10 MG/ML
INJECTION, EMULSION INTRAVENOUS PRN
Status: DISCONTINUED | OUTPATIENT
Start: 2022-07-21 | End: 2022-07-21 | Stop reason: SDUPTHER

## 2022-07-21 RX ORDER — ALBUTEROL SULFATE 90 UG/1
2 AEROSOL, METERED RESPIRATORY (INHALATION) EVERY 4 HOURS PRN
Status: DISCONTINUED | OUTPATIENT
Start: 2022-07-21 | End: 2022-07-22 | Stop reason: HOSPADM

## 2022-07-21 RX ORDER — TRANEXAMIC ACID 100 MG/ML
INJECTION, SOLUTION INTRAVENOUS PRN
Status: DISCONTINUED | OUTPATIENT
Start: 2022-07-21 | End: 2022-07-21 | Stop reason: ALTCHOICE

## 2022-07-21 RX ORDER — MEPERIDINE HYDROCHLORIDE 25 MG/ML
12.5 INJECTION INTRAMUSCULAR; INTRAVENOUS; SUBCUTANEOUS EVERY 5 MIN PRN
Status: DISCONTINUED | OUTPATIENT
Start: 2022-07-21 | End: 2022-07-21 | Stop reason: HOSPADM

## 2022-07-21 RX ADMIN — TRAZODONE HYDROCHLORIDE 100 MG: 100 TABLET ORAL at 22:51

## 2022-07-21 RX ADMIN — FENTANYL CITRATE 50 MCG: 50 INJECTION, SOLUTION INTRAMUSCULAR; INTRAVENOUS at 08:56

## 2022-07-21 RX ADMIN — CEFAZOLIN 2000 MG: 10 INJECTION, POWDER, FOR SOLUTION INTRAVENOUS at 08:35

## 2022-07-21 RX ADMIN — PROPOFOL 100 MG: 10 INJECTION, EMULSION INTRAVENOUS at 08:57

## 2022-07-21 RX ADMIN — KETOROLAC TROMETHAMINE 15 MG: 30 INJECTION, SOLUTION INTRAMUSCULAR; INTRAVENOUS at 15:14

## 2022-07-21 RX ADMIN — DONEPEZIL HYDROCHLORIDE 5 MG: 5 TABLET, FILM COATED ORAL at 22:51

## 2022-07-21 RX ADMIN — PROPOFOL 160 MG: 10 INJECTION, EMULSION INTRAVENOUS at 08:23

## 2022-07-21 RX ADMIN — FENTANYL CITRATE 100 MCG: 50 INJECTION, SOLUTION INTRAMUSCULAR; INTRAVENOUS at 08:23

## 2022-07-21 RX ADMIN — INSULIN GLARGINE 23 UNITS: 100 INJECTION, SOLUTION SUBCUTANEOUS at 22:52

## 2022-07-21 RX ADMIN — CILOSTAZOL 100 MG: 100 TABLET ORAL at 22:51

## 2022-07-21 RX ADMIN — SODIUM CHLORIDE: 9 INJECTION, SOLUTION INTRAVENOUS at 15:24

## 2022-07-21 RX ADMIN — FENTANYL CITRATE 50 MCG: 50 INJECTION, SOLUTION INTRAMUSCULAR; INTRAVENOUS at 08:46

## 2022-07-21 RX ADMIN — SUGAMMADEX 200 MG: 100 INJECTION, SOLUTION INTRAVENOUS at 09:13

## 2022-07-21 RX ADMIN — SODIUM CHLORIDE: 9 INJECTION, SOLUTION INTRAVENOUS at 07:26

## 2022-07-21 RX ADMIN — LIDOCAINE HYDROCHLORIDE 80 MG: 20 INJECTION, SOLUTION INTRAVENOUS at 08:23

## 2022-07-21 RX ADMIN — DEXAMETHASONE SODIUM PHOSPHATE 10 MG: 10 INJECTION, EMULSION INTRAMUSCULAR; INTRAVENOUS at 08:33

## 2022-07-21 RX ADMIN — ROCURONIUM BROMIDE 40 MG: 10 INJECTION INTRAVENOUS at 08:23

## 2022-07-21 RX ADMIN — KETOROLAC TROMETHAMINE 15 MG: 30 INJECTION, SOLUTION INTRAMUSCULAR; INTRAVENOUS at 22:52

## 2022-07-21 RX ADMIN — IPRATROPIUM BROMIDE AND ALBUTEROL SULFATE 1 AMPULE: .5; 3 SOLUTION RESPIRATORY (INHALATION) at 07:38

## 2022-07-21 RX ADMIN — HYDROCODONE BITARTRATE AND ACETAMINOPHEN 1 TABLET: 5; 325 TABLET ORAL at 15:17

## 2022-07-21 RX ADMIN — TRANEXAMIC ACID 1000 MG: 100 INJECTION, SOLUTION INTRAVENOUS at 08:30

## 2022-07-21 RX ADMIN — ONDANSETRON 4 MG: 2 INJECTION INTRAMUSCULAR; INTRAVENOUS at 09:08

## 2022-07-21 ASSESSMENT — PAIN SCALES - GENERAL
PAINLEVEL_OUTOF10: 5
PAINLEVEL_OUTOF10: 3
PAINLEVEL_OUTOF10: 5

## 2022-07-21 ASSESSMENT — PAIN - FUNCTIONAL ASSESSMENT: PAIN_FUNCTIONAL_ASSESSMENT: 0-10

## 2022-07-21 ASSESSMENT — COPD QUESTIONNAIRES: CAT_SEVERITY: NO INTERVAL CHANGE

## 2022-07-21 ASSESSMENT — PAIN DESCRIPTION - DESCRIPTORS: DESCRIPTORS: ACHING

## 2022-07-21 NOTE — ANESTHESIA PRE PROCEDURE
Department of Anesthesiology  Preprocedure Note       Name:  Yazmin Craig   Age:  74 y.o.  :  1950                                          MRN:  [id number]         Date:  2022      Surgeon: Baylee Mclaughlin):  Keyla Méndez MD    Procedure: RIGHT TOTAL HIP REPLACEMENT (Right: Hip)    Medications prior to admission:   Prior to Admission medications    Medication Sig Start Date End Date Taking? Authorizing Provider   carvedilol (COREG) 12.5 MG tablet Take 6.25 mg by mouth 2 times daily (with meals) Take am of sx    Historical Provider, MD   donepezil (ARICEPT) 10 MG tablet Take 5 mg by mouth nightly 3/4/22 3/4/23  Historical Provider, MD   tiotropium-olodaterol (STIOLTO) 2.5-2.5 MCG/ACT AERS Inhale 2 puffs into the lungs daily    Historical Provider, MD Sarah Christie 3-6-9 FATTY ACIDS PO Take 2 capsules by mouth 2 times daily Hold 5 days before sx    Historical Provider, MD   traZODone (DESYREL) 100 MG tablet Take 100 mg by mouth at bedtime 3/4/22   Historical Provider, MD   clopidogrel (PLAVIX) 75 MG tablet Take 75 mg by mouth daily Hold 5 days before sx    Historical Provider, MD   guaiFENesin-codeine (TUSSI-ORGANIDIN NR) 100-10 MG/5ML syrup Take 5 mLs by mouth every 4 hours as needed for Cough or Congestion.   Patient not taking: Reported on 2022    Historical Provider, MD   cilostazol (PLETAL) 100 MG tablet Take 100 mg by mouth 2 times daily Hold 5 days before sx    Historical Provider, MD   acetaminophen (TYLENOL) 325 MG tablet Take 2 tablets by mouth every 4 hours as needed for Pain 18   Estefana Lundborg, PA-C   atorvastatin (LIPITOR) 80 MG tablet Take 1 tablet by mouth daily 11/10/18   Lorena Levin MD   pantoprazole (PROTONIX) 40 MG tablet Take 1 tablet by mouth every morning (before breakfast)  Patient taking differently: Take 40 mg by mouth every morning (before breakfast) Take am of sx 18   LAURA Grajeda - CNP   albuterol sulfate  (90 Base) MCG/ACT inhaler Inhale 2 puffs into the lungs every 4 hours as needed for Wheezing or Shortness of Breath May take am of sx    Historical Provider, MD   Tiotropium Bromide-Olodaterol (STIOLTO RESPIMAT) 2.5-2.5 MCG/ACT AERS Inhale 2 puffs into the lungs daily May take am of sx    Historical Provider, MD   hydrochlorothiazide (HYDRODIURIL) 25 MG tablet Take 25 mg by mouth daily Hold am of sx    Historical Provider, MD   nitroGLYCERIN (NITROSTAT) 0.4 MG SL tablet Place 1 tablet under the tongue every 5 minutes as needed for Chest pain 5/25/16   Lolita Kaur MD   amLODIPine (NORVASC) 10 MG tablet Take 1 tablet by mouth daily  Patient taking differently: Take 10 mg by mouth daily Take am of sx 5/25/16   Lolita Kaur MD   aspirin 81 MG EC tablet Take 81 mg by mouth daily Hold 5 days before sx    Historical Provider, MD       Current medications:    No current facility-administered medications for this visit. No current outpatient medications on file.      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    Chest pain R07.9    Chronic obstructive pulmonary disease (HCC) J44.9    PTSD (post-traumatic stress disorder) F43.10    Osteoarthritis of multiple joints M15.9    Peripheral vascular disease ([address] I73.9    Diabetes mellitus type 2, uncontrolled ([address] E11.65    Descending thoracic aortic aneurysm ([address] I71.2    Internal carotid artery stenosis, left I65.22    Chronic diastolic heart failure (HCC) I50.32    Hypertensive urgency I16.0    CAD (coronary artery disease) I25.10    Left carotid stenosis I65.22    Symptomatic bradycardia R00.1       Past Medical History:        Diagnosis Date    Arthritis     Asthma     Blood circulation, collateral     bilateral LE    CAD (coronary artery disease)     COPD (chronic obstructive pulmonary disease) (HCC)     Diabetes mellitus (HCC)     in the past    GERD (gastroesophageal reflux disease)     Hyperlipidemia     Hypertension     Other disorders of kidney and ureter in diseases classified elsewhere     Psychiatric problem        Past Surgical History:        Procedure Laterality Date    CARDIAC CATHETERIZATION      CAROTID ENDARTERECTOMY Left 2018    LEFT CAROTID ENDARTERECTOMY performed by Obinna Harrell MD at [facility]      right eye for detached retina    TONSILLECTOMY         Social History:    Social History     Tobacco Use    Smoking status: Former     Packs/day: 0.75     Years: 20.00     Pack years: 15.00     Types: Cigarettes     Quit date: 2015     Years since quittin.4    Smokeless tobacco: Never   Substance Use Topics    Alcohol use: No                                Counseling given: Not Answered      Vital Signs (Current): There were no vitals filed for this visit.                                            BP Readings from Last 3 Encounters:   22 138/72   22 (!) 155/70   21 139/63       NPO Status:                                                                                 BMI:   Wt Readings from Last 3 Encounters:   22 210 lb (95.3 kg)   22 204 lb 12.8 oz (92.9 kg)   21 210 lb (95.3 kg)     There is no height or weight on file to calculate BMI.    CBC:   Lab Results   Component Value Date/Time    WBC 10.5 2022 11:20 AM    RBC 4.81 2022 11:20 AM    HGB 13.8 2022 11:20 AM    HCT 42.9 2022 11:20 AM    MCV 89.2 06/16/2022 11:20 AM    RDW 12.6 05/25/2016 05:24 AM     06/16/2022 11:20 AM       CMP:   Lab Results   Component Value Date/Time     06/16/2022 11:20 AM    K 4.0 06/16/2022 11:20 AM    K 3.7 04/07/2022 11:05 AM     06/16/2022 11:20 AM    CO2 27 06/16/2022 11:20 AM    BUN 16 06/16/2022 11:20 AM    CREATININE 0.8 06/16/2022 11:20 AM    LABGLOM >90 06/16/2022 11:20 AM    GLUCOSE 159 06/16/2022 11:20 AM    PROT 6.0 04/06/2022 07:00 AM    CALCIUM 9.5 06/16/2022 11:20 AM    BILITOT 0.4 04/06/2022 07:00 AM    ALKPHOS 115 04/06/2022 07:00 AM    AST 17 04/06/2022 07:00 AM    ALT 12 04/06/2022 07:00 AM       POC Tests: No results for input(s): POCGLU, POCNA, POCK, POCCL, POCBUN, POCHEMO, POCHCT in the last 72 hours.     Coags:   Lab Results   Component Value Date/Time    INR 0.90 12/26/2018 11:00 AM    APTT 32.4 12/26/2018 11:00 AM       HCG (If Applicable): No results found for: PREGTESTUR, PREGSERUM, HCG, HCGQUANT     ABGs: No results found for: PHART, PO2ART, LKC2IEN, NIO6IGE, BEART, A1WRUAND     Type & Screen (If Applicable):  Lab Results   Component Value Date    [address] POS 12/26/2018       Anesthesia Evaluation  Patient summary reviewed and Nursing notes reviewed no history of anesthetic complications:   Airway: Mallampati: II  TM distance: >3 FB   Neck ROM: full  Mouth opening: > = 3 FB   Dental:    (+) upper dentures and partials      Pulmonary: breath sounds clear to auscultation  (+) COPD: no interval change,  decreased breath sounds asthma:                            Cardiovascular:  Exercise tolerance: poor (<4 METS),   (+) hypertension:, CAD (plavix held for 5 days):,         Rhythm: regular  Rate: normal                    Neuro/Psych:      (-) seizures, TIA and CVA           GI/Hepatic/Renal:   (+) GERD: well controlled,      (-) liver disease and no renal disease       Endo/Other:    (+) DiabetesType II DM, , .    (-) hypothyroidism, hyperthyroidism               Abdominal: Vascular:   + PVD, aortic or cerebral (left carotid), . Other Findings:             Anesthesia Plan      general     ASA 3     (PIV. Additional access can be obtained after induction if needed. Standard ASA monitors. IV/PO opioids and other adjuncts as needed for pain control. PACU post op for recovery. Possible anesthetics complications were discussed with the patient, including but not limited to: PONV, damage to the airway and surrounding structures (teeth, lips, gums, tongue, etc.), adverse reactions to medicine, cardiac complications (MI, CHF, arrhythmias, etc.), respiratory complications (post-op ventilation, respiratory failure, etc.), neurologic complications (nerve damage, stroke, seizure), and death. The patient was given the opportunity to ask questions and all questions were answered to the patient's satisfaction. The patient is in agreement with the anesthetic plan. Duoneb in SDS)  Induction: intravenous. MIPS: Postoperative opioids intended and Prophylactic antiemetics administered. Anesthetic plan and risks discussed with patient and spouse. Plan discussed with CRNA.                     Justice George DO   7/21/2022

## 2022-07-21 NOTE — PROGRESS NOTES
ADMITTED TO \[facility]\"" AND ORIENTED TO UNIT. SCDS ON. FALL  BANDS ON. PT VERBALIZED APPROVAL FOR FIRST NAME, LAST INITIAL AND PHYSICIAN NAME ON UNIT WHITEBOARD.

## 2022-07-21 NOTE — H&P
History and Physical        CHIEF COMPLAINT:  Right hip pain    HISTORY OF PRESENT ILLNESS:      The patient is a 74 y.o. male  who presents with chronic right hip pain. Known to have bilateral hip pain with xrays demonstrating severe osteoarthritis end stage disease. Underwent a left total hip arthroplasty in July 2021 and did well with that. He has undergone intra-articular corticosteroid injections to the right hip however those have offered less and less relief. He is having increasing difficulty with activities of daily living and a decrease in quality of life secondary to his right hip pain. Significant pain with ambulating for long distances and long periods of time. Difficulty getting up from a seated position. Endorses nocturnal pain. Secondary to the failure of conservative management, worsening symptoms, impact on his quality of life, and considering the relief he got from his left total hip arthroplasty a right total hip arthroplasty was recommended; patient agrees and wishes to proceed. Past Medical History:    Past Medical History:   Diagnosis Date    Arthritis     Asthma     Blood circulation, collateral     bilateral LE    CAD (coronary artery disease)     COPD (chronic obstructive pulmonary disease) ([facility])     Diabetes mellitus ([facility] [address]     in the past    GERD (gastroesophageal reflux disease)     Hyperlipidemia     Hypertension     Other disorders of kidney and ureter in diseases classified elsewhere     Psychiatric problem        Past Surgical History:    Past Surgical History:   Procedure Laterality Date    CARDIAC CATHETERIZATION      CAROTID ENDARTERECTOMY Left 12/26/2018    LEFT CAROTID ENDARTERECTOMY performed by Savanna Blair MD at [facility]      right eye for detached retina    TONSILLECTOMY         Medications Prior to Admission:   Prior to Admission medications    Medication Sig Start Date End Date Taking?  Authorizing Provider   carvedilol (COREG) 12.5 MG tablet Take 6.25 mg by mouth 2 times daily (with meals) Take am of sx   Yes Historical Provider, MD   donepezil (ARICEPT) 10 MG tablet Take 5 mg by mouth nightly 3/4/22 3/4/23  Historical Provider, MD   OMEGA 3-6-9 FATTY ACIDS PO Take 2 capsules by mouth 2 times daily Hold 5 days before sx    Historical Provider, MD   traZODone (DESYREL) 100 MG tablet Take 100 mg by mouth at bedtime 3/4/22   Historical Provider, MD   clopidogrel (PLAVIX) 75 MG tablet Take 75 mg by mouth daily Hold 5 days before sx    Historical Provider, MD   guaiFENesin-codeine (TUSSI-ORGANIDIN NR) 100-10 MG/5ML syrup Take 5 mLs by mouth every 4 hours as needed for Cough or Congestion.   Patient not taking: Reported on 7/14/2022    Historical Provider, MD   cilostazol (PLETAL) 100 MG tablet Take 100 mg by mouth 2 times daily Hold 5 days before sx    Historical Provider, MD   acetaminophen (TYLENOL) 325 MG tablet Take 2 tablets by mouth every 4 hours as needed for Pain 12/27/18   Nighat Cline PA-C   atorvastatin (LIPITOR) 80 MG tablet Take 1 tablet by mouth daily 11/10/18   Rayleen Cogan, MD   pantoprazole (PROTONIX) 40 MG tablet Take 1 tablet by mouth every morning (before breakfast)  Patient taking differently: Take 40 mg by mouth every morning (before breakfast) Take am of sx 11/9/18   LAURA Beltran - CNP   albuterol sulfate  (90 Base) MCG/ACT inhaler Inhale 2 puffs into the lungs every 4 hours as needed for Wheezing or Shortness of Breath May take am of sx    Historical Provider, MD   tiotropium-olodaterol (STIOLTO) 2.5-2.5 MCG/ACT AERS Inhale 2 puffs into the lungs daily May take am of sx    Historical Provider, MD   hydrochlorothiazide (HYDRODIURIL) 25 MG tablet Take 25 mg by mouth daily Hold am of sx    Historical Provider, MD   nitroGLYCERIN (NITROSTAT) 0.4 MG SL tablet Place 1 tablet under the tongue every 5 minutes as needed for Chest pain 5/25/16   Crissie Sessions, MD   amLODIPine (NORVASC) 10 MG tablet Take 1 tablet by mouth daily  Patient taking differently: Take 10 mg by mouth in the morning. Take am of sx. 16   Andreea Sebastian MD   aspirin 81 MG EC tablet Take 81 mg by mouth daily Hold 5 days before sx    Historical Provider, MD    Scheduled Meds:   ceFAZolin (ANCEF) IVPB  2,000 mg IntraVENous On Call to OR    ipratropium-albuterol  1 ampule Inhalation Once     Continuous Infusions:   sodium chloride 125 mL/hr at 22 0726     PRN Meds:.sodium chloride flush      Allergies:  Patient has no known allergies.     Social History:   Social History     Socioeconomic History    Marital status:      Spouse name: Lorena Reed    Number of children: 5   Tobacco Use    Smoking status: Former     Packs/day: 0.75     Years: 20.00     Pack years: 15.00     Types: Cigarettes     Quit date: 2015     Years since quittin.4    Smokeless tobacco: Never   Vaping Use    Vaping Use: Never used   Substance and Sexual Activity    Alcohol use: No    Drug use: No     Social History     Tobacco Use   Smoking Status Former    Packs/day: 0.75    Years: 20.00    Pack years: 15.00    Types: Cigarettes    Quit date: 2015    Years since quittin.4   Smokeless Tobacco Never     Social History     Substance and Sexual Activity   Alcohol Use No     Social History     Substance and Sexual Activity   Drug Use No       Family History:  Family History   Problem Relation Age of Onset    Cancer Mother         bladder    Diabetes Mother     Asthma Father     Diabetes Sister     Heart Disease Brother         MI @ 52    Heart Disease Sister         MI in 45s         REVIEW OF SYSTEMS:  Gen: Negative for nausea, vomiting, diarrhea, fever, chills, night sweats  Heart: Negative for HTN, palpitations, chest pain  Lungs: Negative for wheezes, asthma or SOB  GI: Negative for nausea, vomiting  Endo: Negative for diabetes  Heme: Negative for DVT   Musculoskeletal:  Positive for arthralgia, joint stiffness, difficulty with ambulation, antalgic gait    PHYSICAL EXAM:  Patient Vitals for the past 24 hrs:   BP Temp Pulse Resp SpO2 Height Weight   07/21/22 0655 -- -- -- -- -- 5' 10\" (1.778 m) 202 lb (91.6 kg)   07/21/22 0649 138/72 98.1 °F (36.7 °C) 63 18 96 % -- --     Gen: alert and oriented x3  Head: normorcephalic, atraumatic  Neck: supple  Heart: RRR  Lungs: No audible wheezes  Abdomen: soft  Pelvis: stable  RLE:  Skin in good repair without rash, lesion, deformity. Limited ROM of hip especially with IR/ER/ABD of the hip with severe pain referred to the groin/hip. ROM knee, ankle, toes intact without difficulty nor pain. Neurovascularly intact without obvious neuro-deficits. Dorsalis pedal and posterior tibialis pulses strong and regular. DATA:  CBC:   Lab Results   Component Value Date/Time    WBC 10.5 06/16/2022 11:20 AM    HGB 13.8 06/16/2022 11:20 AM     06/16/2022 11:20 AM     BMP:    Lab Results   Component Value Date/Time     06/16/2022 11:20 AM    K 4.0 06/16/2022 11:20 AM    K 3.7 04/07/2022 11:05 AM     06/16/2022 11:20 AM    CO2 27 06/16/2022 11:20 AM    BUN 16 06/16/2022 11:20 AM    CREATININE 0.8 06/16/2022 11:20 AM    CALCIUM 9.5 06/16/2022 11:20 AM    GLUCOSE 159 06/16/2022 11:20 AM     PT/INR:    Lab Results   Component Value Date/Time    INR 0.90 12/26/2018 11:00 AM     Troponin:  No results found for: TROPONINI      ASSESSMENT:Principal Problem:    Primary osteoarthritis of right hip  Resolved Problems:    * No resolved hospital problems. *       PLAN:  As discussed with Dr Laura Santana, ortho attending surgeon, plan is to proceed with a right total hip arthroplasty at [facility] on 7/21/2022. Patient well aware of the risks and benefits as he has already undergone a left total hip arthroplasty last year. Risks such as but not limited to infection, stiffness of the hip, DVTs, risks associated with anesthesia, failure of the components, and the risks surrounding the COVID 19 pandemic were discussed and understood.  Patient medically optimized and cleared by medicine to proceed with surgery. Patient desires to proceed at this time.         ELISE Aguirre

## 2022-07-21 NOTE — PROGRESS NOTES
0935-pt to pacu oral airway in place resp easy, unlabored. Pt appears in no acute distress. 0991- pt woke oral airway removed. Pt denies pain, nausea, resp easy, unlabored. Pt drifts back to sleep quickly. 8937- pt continues to deny pain. Resting in bed eyes open. Talkative. 1002-report called to [facility] [place] [facility]- [facility] RN staff to transport back to [facility]. 1005- pt meets criteria for discharge from pacu, shad to [facility] awaiting [facility] [place] IP room.

## 2022-07-21 NOTE — ANESTHESIA POSTPROCEDURE EVALUATION
Department of Anesthesiology  Postprocedure Note    Patient: Manny Cornejo  MRN: [id number]  YOB: 1950  Date of evaluation: 7/21/2022      Procedure Summary     Date: 07/21/22 Room / Location: [address] H[facility] [place]    Anesthesia Start: 0817 Anesthesia Stop: 0939    Procedure: RIGHT TOTAL HIP REPLACEMENT (Right: Hip) Diagnosis:       Osteoarthritis of right hip, unspecified osteoarthritis type      (Osteoarthritis of right hip, unspecified osteoarthritis type [M16.11])    Surgeons: Tracy Tanner MD Responsible Provider: Jerald Padron MD    Anesthesia Type: general ASA Status: 3          Anesthesia Type: No value filed.     Ko Phase I: Ko Score: 9    Ko Phase II:        Anesthesia Post Evaluation    Complications: no

## 2022-07-21 NOTE — DISCHARGE INSTRUCTIONS
Gwen Aguila MD       ACTIVITY / WEIGHTBEARING  INSTRUCTIONS:  Weightbearing as tolerated right lower extremity. PT/OT     DIET:  Increase Fluid/Water intake, eat foods high in fiber, fruits and vegetables to help to prevent constipation. WOUND/DRESSING INSTRUCTIONS:  Always ensure you wash your hands before and after caring for your wound/dressing. Keep your dressing clean and dry. Change dressing as needed. Can wash around incision. If prineo (mesh tape dressing) in place, this may be removed after 3 weeks. You may shower with prineo dressing if no active drainage coming from incision. Do not let shower water directly hit the incision. Dry completely. Do not place antibiotic ointment, lotion, creams on surgical incision. Smoking impairs adequate wound healing. If smoking, consider quitting. May apply ice to surgical incision to help to prevent swelling. MEDICATION INSTRUCTIONS:  Take pain medication as prescribed. See orders regarding resuming your home medications and any new medications. Continue blood thinner if ordered by your physician. FOLLOW-UP CARE:  If a follow-up appointment was not made for you at discharge, call [phone] [facility] - INPAT[facility] office) or [phone] [facility] office) to schedule an appointment for 8 weeks. Call Dr Aide Correa office with any concerns. Signs of infection such as fever, chills, redness, pus, or bad smelling discharge from incision site. Excessive bleeding, swelling, increasing pain, or discharge around incision site. The stitches or staples come apart at the incision site. Cough shortness of breath, or chest pain. Severe nausea or vomiting. Pain that you cannot control with the medications you have been given or  pain becomes worse. Numbness, tingling, or loss of feeling in your leg, knee, or foot. Pain, burning, urgency, or frequency of urination.       If a medical emergerncy, please call 911 or go to your local emergency room.

## 2022-07-21 NOTE — OP NOTE
replacement and we have elected to proceed     PROCEDURE DETAILS     The patient was taken to the operating room, underwent a general anesthetic, placed in lateral position Right side up. Care was taken to pad all bony prominences. Timeout was taken, consent was confirmed. The patient received Ancef 2 grams within 30 minutes of incision. Started with a lateral approach to the hip with skin knife followed by electrocautery down to the iliotibial band. The iliotibial band was then split. Charnley C retractor was put in position and took down the anterior third of the gluteus medius tendon. Placed the leg in a figure-of-four, made a cut about a fingerbreadth above the lesser trochanter. We removed the remaining head and neck. Placed Hohmann's around the acetabulum, cleaned soft tissue from the acetabulum deepened the acetabulum. Reamed up sequentially to size 52. Implanted size 52 cup at 45 degrees abduction, 20 degrees of anteversion. We placed one 25 mm screw between the tables of the pelvis. Nice fixation was obtained. Standard 36 mm liner. Removed a few posterior osteophytes. We then used the  followed by the canal finder. We broached to a size 7 . We implanted the same size stem. We trialed head and neck lengths, elected to go with a +0 neck length. It was stable throughout all range of motion. Limb lengths appear to be appropriate. Wounds were thoroughly irrigated. Repaired the abductor and capsule back with #5 Ethibond through bone tunnels. There was a intracapsular with injected with morphine, Toradol, marcaine with epinephrine and tranexamic acid. The iliotibial band was repaired with #2 [place]. Skin was repaired with 0 Quill, Prineo, and dry dressings. The patient was then awakened and returned to the recovery room in fair condition. POSTOPERATIVE PLAN: Weightbearing as tolerated, total hip arthroplasty protocol. First postop visit will be a clinical exam, no x-rays in 8 weeks.      The physician assistant assisted throughout the procedure with positioning, draping, retraction, wound closure, and dressings.     Lisa Tesfaye MD     Electronically signed by Lisa Tesfaye MD on 7/21/2022 at 9:21 AM

## 2022-07-22 VITALS
DIASTOLIC BLOOD PRESSURE: 70 MMHG | HEIGHT: 70 IN | OXYGEN SATURATION: 99 % | RESPIRATION RATE: 16 BRPM | BODY MASS INDEX: 28.92 KG/M2 | TEMPERATURE: 97.7 F | SYSTOLIC BLOOD PRESSURE: 110 MMHG | WEIGHT: 202 LBS | HEART RATE: 64 BPM

## 2022-07-22 LAB
ANION GAP SERPL CALCULATED.3IONS-SCNC: 13 MEQ/L (ref 8–16)
AVERAGE GLUCOSE: 132 MG/DL (ref 70–126)
BASOPHILS # BLD: 0.1 %
BASOPHILS ABSOLUTE: 0 THOU/MM3 (ref 0–0.1)
BUN BLDV-MCNC: 15 MG/DL (ref 7–22)
CALCIUM SERPL-MCNC: 8.7 MG/DL (ref 8.5–10.5)
CHLORIDE BLD-SCNC: 107 MEQ/L (ref 98–111)
CO2: 19 MEQ/L (ref 23–33)
CREAT SERPL-MCNC: 0.8 MG/DL (ref 0.4–1.2)
EOSINOPHIL # BLD: 0.1 %
EOSINOPHILS ABSOLUTE: 0 THOU/MM3 (ref 0–0.4)
ERYTHROCYTE [DISTWIDTH] IN BLOOD BY AUTOMATED COUNT: 12.4 % (ref 11.5–14.5)
ERYTHROCYTE [DISTWIDTH] IN BLOOD BY AUTOMATED COUNT: 40.1 FL (ref 35–45)
GFR SERPL CREATININE-BSD FRML MDRD: > 90 ML/MIN/1.73M2
GLUCOSE BLD-MCNC: 121 MG/DL (ref 70–108)
GLUCOSE BLD-MCNC: 131 MG/DL (ref 70–108)
GLUCOSE BLD-MCNC: 142 MG/DL (ref 70–108)
HBA1C MFR BLD: 6.4 % (ref 4.4–6.4)
HCT VFR BLD CALC: 38.2 % (ref 42–52)
HEMOGLOBIN: 12.2 GM/DL (ref 14–18)
IMMATURE GRANS (ABS): 0.06 THOU/MM3 (ref 0–0.07)
IMMATURE GRANULOCYTES: 0.4 %
LYMPHOCYTES # BLD: 9.6 %
LYMPHOCYTES ABSOLUTE: 1.4 THOU/MM3 (ref 1–4.8)
MCH RBC QN AUTO: 28.4 PG (ref 26–33)
MCHC RBC AUTO-ENTMCNC: 31.9 GM/DL (ref 32.2–35.5)
MCV RBC AUTO: 89 FL (ref 80–94)
MONOCYTES # BLD: 8 %
MONOCYTES ABSOLUTE: 1.2 THOU/MM3 (ref 0.4–1.3)
NUCLEATED RED BLOOD CELLS: 0 /100 WBC
PLATELET # BLD: 176 THOU/MM3 (ref 130–400)
PLATELET ESTIMATE: ADEQUATE
PMV BLD AUTO: 12.2 FL (ref 9.4–12.4)
POTASSIUM SERPL-SCNC: 4.2 MEQ/L (ref 3.5–5.2)
RBC # BLD: 4.29 MILL/MM3 (ref 4.7–6.1)
SCAN OF BLOOD SMEAR: NORMAL
SEG NEUTROPHILS: 81.8 %
SEGMENTED NEUTROPHILS ABSOLUTE COUNT: 11.9 THOU/MM3 (ref 1.8–7.7)
SODIUM BLD-SCNC: 139 MEQ/L (ref 135–145)
WBC # BLD: 14.6 THOU/MM3 (ref 4.8–10.8)

## 2022-07-22 PROCEDURE — 83036 HEMOGLOBIN GLYCOSYLATED A1C: CPT

## 2022-07-22 PROCEDURE — 6370000000 HC RX 637 (ALT 250 FOR IP): Performed by: NURSE PRACTITIONER

## 2022-07-22 PROCEDURE — 97530 THERAPEUTIC ACTIVITIES: CPT

## 2022-07-22 PROCEDURE — 36415 COLL VENOUS BLD VENIPUNCTURE: CPT

## 2022-07-22 PROCEDURE — 82948 REAGENT STRIP/BLOOD GLUCOSE: CPT

## 2022-07-22 PROCEDURE — 94640 AIRWAY INHALATION TREATMENT: CPT

## 2022-07-22 PROCEDURE — 97162 PT EVAL MOD COMPLEX 30 MIN: CPT

## 2022-07-22 PROCEDURE — 97535 SELF CARE MNGMENT TRAINING: CPT

## 2022-07-22 PROCEDURE — 6360000002 HC RX W HCPCS: Performed by: NURSE PRACTITIONER

## 2022-07-22 PROCEDURE — 97116 GAIT TRAINING THERAPY: CPT

## 2022-07-22 PROCEDURE — 2580000003 HC RX 258: Performed by: NURSE PRACTITIONER

## 2022-07-22 PROCEDURE — 80048 BASIC METABOLIC PNL TOTAL CA: CPT

## 2022-07-22 PROCEDURE — 85025 COMPLETE CBC W/AUTO DIFF WBC: CPT

## 2022-07-22 PROCEDURE — 97166 OT EVAL MOD COMPLEX 45 MIN: CPT

## 2022-07-22 RX ORDER — HYDROCODONE BITARTRATE AND ACETAMINOPHEN 5; 325 MG/1; MG/1
1 TABLET ORAL EVERY 6 HOURS PRN
Qty: 28 TABLET | Refills: 0 | Status: SHIPPED | OUTPATIENT
Start: 2022-07-22 | End: 2022-07-29

## 2022-07-22 RX ORDER — CYCLOBENZAPRINE HCL 10 MG
10 TABLET ORAL 3 TIMES DAILY PRN
Qty: 30 TABLET | Refills: 0 | Status: SHIPPED | OUTPATIENT
Start: 2022-07-22 | End: 2022-08-01

## 2022-07-22 RX ADMIN — KETOROLAC TROMETHAMINE 15 MG: 30 INJECTION, SOLUTION INTRAMUSCULAR; INTRAVENOUS at 09:23

## 2022-07-22 RX ADMIN — MAGNESIUM HYDROXIDE 30 ML: 400 SUSPENSION ORAL at 09:23

## 2022-07-22 RX ADMIN — ASPIRIN 81 MG: 81 TABLET, COATED ORAL at 09:24

## 2022-07-22 RX ADMIN — ATORVASTATIN CALCIUM 80 MG: 80 TABLET, FILM COATED ORAL at 09:24

## 2022-07-22 RX ADMIN — PANTOPRAZOLE SODIUM 40 MG: 40 TABLET, DELAYED RELEASE ORAL at 05:52

## 2022-07-22 RX ADMIN — HYDROCHLOROTHIAZIDE 25 MG: 25 TABLET ORAL at 09:23

## 2022-07-22 RX ADMIN — CEFAZOLIN 2000 MG: 10 INJECTION, POWDER, FOR SOLUTION INTRAVENOUS at 00:22

## 2022-07-22 RX ADMIN — CLOPIDOGREL BISULFATE 75 MG: 75 TABLET ORAL at 09:23

## 2022-07-22 RX ADMIN — TIOTROPIUM BROMIDE AND OLODATEROL 2 PUFF: 3.124; 2.736 SPRAY, METERED RESPIRATORY (INHALATION) at 08:55

## 2022-07-22 RX ADMIN — DOCUSATE SODIUM 100 MG: 100 CAPSULE, LIQUID FILLED ORAL at 09:24

## 2022-07-22 RX ADMIN — KETOROLAC TROMETHAMINE 15 MG: 30 INJECTION, SOLUTION INTRAMUSCULAR; INTRAVENOUS at 05:47

## 2022-07-22 ASSESSMENT — PAIN - FUNCTIONAL ASSESSMENT: PAIN_FUNCTIONAL_ASSESSMENT: ACTIVITIES ARE NOT PREVENTED

## 2022-07-22 ASSESSMENT — PAIN DESCRIPTION - FREQUENCY: FREQUENCY: CONTINUOUS

## 2022-07-22 ASSESSMENT — PAIN DESCRIPTION - LOCATION: LOCATION: HIP

## 2022-07-22 ASSESSMENT — PAIN DESCRIPTION - PAIN TYPE: TYPE: ACUTE PAIN;SURGICAL PAIN

## 2022-07-22 ASSESSMENT — PAIN DESCRIPTION - ONSET: ONSET: ON-GOING

## 2022-07-22 ASSESSMENT — PAIN DESCRIPTION - ORIENTATION: ORIENTATION: RIGHT

## 2022-07-22 ASSESSMENT — PAIN DESCRIPTION - DESCRIPTORS: DESCRIPTORS: DISCOMFORT

## 2022-07-22 ASSESSMENT — PAIN SCALES - GENERAL: PAINLEVEL_OUTOF10: 2

## 2022-07-22 NOTE — PROGRESS NOTES
[address] [address]  INPATIENT OCCUPATIONAL THERAPY  [facility] ORTHOPEDICS 7K  EVALUATION    Time:   Time In: 945  Time Out: 1013  Timed Code Treatment Minutes: 20 Minutes  Minutes: 28          Date: 2022  Patient Name: Maximino Anne,   Gender: male      MRN: [id number]  : 1950  (74 y.o.)  Referring Practitioner: LAURA Lee CNP  Diagnosis: Primary Osteoarthritis of Right Hip  Additional Pertinent Hx: Per EMR \"The patient is a 74 y.o. male  who presents with chronic right hip pain. Known to have bilateral hip pain with xrays demonstrating severe osteoarthritis end stage disease. Underwent a left total hip arthroplasty in 2021 and did well with that. He has undergone intra-articular corticosteroid injections to the right hip however those have offered less and less relief. He is having increasing difficulty with activities of daily living and a decrease in quality of life secondary to his right hip pain. Significant pain with ambulating for long distances and long periods of time. Difficulty getting up from a seated position. Endorses nocturnal pain. Secondary to the failure of conservative management, worsening symptoms, impact on his quality of life, and considering the relief he got from his left total hip arthroplasty a right total hip arthroplasty was recommended; patient agrees and wishes to proceed. \" Pt is s/p R ELIZABETH completed by Dr Elvie Mustafa on . Restrictions/Precautions:  Restrictions/Precautions: General Precautions, Fall Risk, Surgical Protocols  Position Activity Restriction  Hip Precautions: No hip flexion > 90 degrees, No ADduction, No hip internal rotation    Subjective  Chart Reviewed: Yes, Orders, Progress Notes, History and Physical, Imaging, Operative Notes  Patient assessed for rehabilitation services?: Yes    Subjective: RN okyed OT session. Upon arrival patient was sitting up in recliner. Pt was agreeable to OT session. Pain: 4/10: hip pain.      Vitals: Vitals not assessed per clinical judgement, see nursing flowsheet    Social/Functional History:  Lives With: Spouse  Type of Home: House  Home Layout: Two level, Performs ADL's on one level  Home Access: Stairs to enter with rails  Entrance Stairs - Number of Steps: 3 KEYANA  Entrance Stairs - Rails: Both  Home Equipment: Rollator, Walker, rolling   Bathroom Shower/Tub: Walk-in shower  Bathroom Toilet: Standard  Bathroom Equipment: Shower chair, Toilet raiser, Grab bars in shower    Receives Help From: Family  ADL Assistance: Independent  Homemaking Assistance: Needs assistance  Ambulation Assistance: Independent  Transfer Assistance: Independent    Active : Yes  Occupation: Retired  Additional Comments: Uses [place] for [employer] or longer distances. Wife completes cooking and cleaning tasks. reports he has early stage dementia    VISION:WFL    HEARING:  WFL    COGNITION: Decreased Recall and Decreased Safety Awareness    RANGE OF MOTION:  Bilateral Upper Extremity:  WFL    STRENGTH:  Bilateral Upper Extremity:  WFL    SENSATION:   WFL    ADL:   Grooming: Stand By Assistance. To complete hand hygiene and oral hygiene standing sinkside. Toileting: Stand By Assistance and with increased time for completion. Toilet Transfer: [address] and with increased time for completion. From standard toilet with L grab bar . BALANCE:  Sitting Balance:  Stand By Assistance. Standing Balance: Contact Guard Assistance. Close SBA    BED MOBILITY:  Not Tested    TRANSFERS:  Sit to Stand:  [address], with increased time for completion, cues for hand placement. Stand to Sit: Stand By Assistance, with increased time for completion, cues for hand placement. FUNCTIONAL MOBILITY:  Assistive Device: Rolling Walker  Assist Level:  Contact Guard Assistance. Distance: To and from bathroom  Slow pace, No LOB. Activity Tolerance:  Patient tolerance of  treatment: good. Assessment: This 74year old male presents s/p R ELIZABETH. Pt demonstrates weakness, decreased balance, decrease safety awareness, decreased endurance . Pt requires skilled OT intervention to increase indep and safety with all self cares, transfers, mobility, and IADLs to return to PLOF. Without skilled OT intervention patient is at increased risk for falls, caregiver burden, and hospital readmission after discharge. Pt would benefit from [facility] at discharge. Performance deficits / Impairments: Decreased functional mobility , Decreased ADL status, Decreased safe awareness, Decreased endurance, Decreased balance, Decreased high-level IADLs, Decreased strength  Prognosis: Good  REQUIRES OT FOLLOW-UP: Yes    Treatment Initiated: Treatment and education initiated within context of evaluation. Evaluation time included review of current medical information, gathering information related to past medical, social and functional history, completion of standardized testing, formal and informal observation of tasks, assessment of data and development of plan of care and goals. Treatment time included skilled education and facilitation of tasks to increase safety and independence with ADL's for improved functional independence and quality of life.     Discharge Recommendations:  Continue to assess pending progress, Home with Home health OT    Patient Education:     Patient Education  Education Given To: Patient  Education Provided: Role of Therapy, Plan of Care, Precautions, ADL Adaptive Strategies, Transfer Training  Education Method: Demonstration  Barriers to Learning: Cognition  Education Outcome: Continued education needed    Equipment Recommendations:  Equipment Needed: No  Other: Monitor need for [employer]    Plan:  Times per Week: 6x  Current Treatment Recommendations: Strengthening, Balance training, Functional mobility training, Endurance training, Safety education & training, Patient/Caregiver education & training, Equipment evaluation, education, & procurement, Self-Care / ADL, Home management training. See long-term goal time frame for expected duration of plan of care. If no long-term goals established, a short length of stay is anticipated. Goals:  Patient goals : Go Home with spouse  Short Term Goals  Time Frame for Short term goals: Until discharge  Short Term Goal 1: Pt will complete BUE strength with min vcs for technique to increase indep and endurance with all self cares and transfers. Short Term Goal 2: Pt will complete standing tolerance x 7 minutes with 2 UE release and S to increase indep and endurance with sinkside grooming. Short Term Goal 3: Pt will complete functional mobility to/from BR and HH distances with S to increase indep with toileting. Short Term Goal 4: Pt will complete LB dressing with LHAE and min A to increase indep in home environment while maintaining hip precautions. Additional Goals?: No         Following session, patient left in safe position with all fall risk precautions in place.

## 2022-07-22 NOTE — CONSULTS
Hospitalist Consult Note      Patient:  Simon Bangura    Unit/Bed:7K-02/002-A  YOB: 1950  MRN: [id number]   Acct: [de-identified]   PCP: Jarod Barth MD  Code Status: Full Code  Date of Admission: 7/21/2022  Date of Service: Pt seen/examined in consultation on 7/21/2022      Chief Complaint: Right hip pain  Reason for consult: Medical management asthma, COPD, CAD, DM, HTN, HLD    Assessment and Plan:    Osteoarthritis right hip: s/p right total hip replacement 7/21. Management per primary    CAD s/p remote PCI: Aspirin, Pletal, statin resumed. Beta-blocker recently discontinued for bradycardia. Patient is not on ace/arb. Follows with Dr. Mark Zuniga. NIDDMII: Glucose >200. Basal insulin added, SSI. Check A1c. Hypoglycemia protocol in place    COPD: Not in acute exacerbation. Continue home medications    Essential hypertension: Continue home amlodipine, HCTZ. Monitor and adjust as indicated. History Of Present Illness:    Simon Bangura 74 y.o. male who we are asked to see/evaluate by Dawna Levy MD for medical management of asthma, COPD, CAD, DM, HTN, HLD. Patient seen postop. He is sitting up, eating, doing well. States that his pain is well controlled. The patient denies any fever/chills, chest pain, shortness of breath, abdominal pain, N/V/D, urinary symptoms. VSS, home meds resumed. Patient is doing well from hospital standpoint. Review of systems:   Pertinent positives as noted in the HPI. All other systems reviewed and negative.     Past Medical History:        Diagnosis Date    Arthritis     Asthma     Blood circulation, collateral     bilateral LE    CAD (coronary artery disease)     COPD (chronic obstructive pulmonary disease) (HCC)     Diabetes mellitus ([facility] [address]     in the past    GERD (gastroesophageal reflux disease)     Hyperlipidemia     Hypertension     Other disorders of kidney and ureter in diseases classified elsewhere     Psychiatric problem        Past Surgical History:        Procedure Laterality Date    CARDIAC CATHETERIZATION      CAROTID ENDARTERECTOMY Left 12/26/2018    LEFT CAROTID ENDARTERECTOMY performed by Paola Ventura MD at [facility]      right eye for detached retina    TONSILLECTOMY         Home Medications:   No current facility-administered medications on file prior to encounter. Current Outpatient Medications on File Prior to Encounter   Medication Sig Dispense Refill    carvedilol (COREG) 12.5 MG tablet Take 6.25 mg by mouth 2 times daily (with meals) Take am of sx      donepezil (ARICEPT) 10 MG tablet Take 5 mg by mouth nightly      OMEGA 3-6-9 FATTY ACIDS PO Take 2 capsules by mouth 2 times daily Hold 5 days before sx      traZODone (DESYREL) 100 MG tablet Take 100 mg by mouth at bedtime      clopidogrel (PLAVIX) 75 MG tablet Take 75 mg by mouth daily Hold 5 days before sx      guaiFENesin-codeine (TUSSI-ORGANIDIN NR) 100-10 MG/5ML syrup Take 5 mLs by mouth every 4 hours as needed for Cough or Congestion.  (Patient not taking: Reported on 7/14/2022)      cilostazol (PLETAL) 100 MG tablet Take 100 mg by mouth 2 times daily Hold 5 days before sx      acetaminophen (TYLENOL) 325 MG tablet Take 2 tablets by mouth every 4 hours as needed for Pain 120 tablet 3    atorvastatin (LIPITOR) 80 MG tablet Take 1 tablet by mouth daily 30 tablet 0    pantoprazole (PROTONIX) 40 MG tablet Take 1 tablet by mouth every morning (before breakfast) (Patient taking differently: Take 40 mg by mouth every morning (before breakfast) Take am of sx) 30 tablet 11    albuterol sulfate  (90 Base) MCG/ACT inhaler Inhale 2 puffs into the lungs every 4 hours as needed for Wheezing or Shortness of Breath May take am of sx      tiotropium-olodaterol (STIOLTO) 2.5-2.5 MCG/ACT AERS Inhale 2 puffs into the lungs daily May take am of sx      hydrochlorothiazide (HYDRODIURIL) 25 MG tablet Take 25 mg by mouth daily Hold am of sx nitroGLYCERIN (NITROSTAT) 0.4 MG SL tablet Place 1 tablet under the tongue every 5 minutes as needed for Chest pain 25 tablet 3    amLODIPine (NORVASC) 10 MG tablet Take 1 tablet by mouth daily (Patient taking differently: Take 10 mg by mouth in the morning. Take am of sx.) 30 tablet 3    aspirin 81 MG EC tablet Take 81 mg by mouth daily Hold 5 days before sx         Allergies:    Patient has no known allergies. Social History:    reports that he quit smoking about 7 years ago. He has a 15.00 pack-year smoking history. He has never used smokeless tobacco. He reports that he does not drink alcohol and does not use drugs. Family History:       Problem Relation Age of Onset    Cancer Mother         bladder    Diabetes Mother     Asthma Father     Diabetes Sister     Heart Disease Brother         MI @ 52    Heart Disease Sister         MI in 45s       Diet:  ADULT DIET; Regular; 4 carb choices (60 gm/meal)      PHYSICAL EXAM:  BP (!) 150/79   Pulse 64   Temp 98.2 °F (36.8 °C) (Oral)   Resp 14   Ht 5' 10\" (1.778 m)   Wt 202 lb (91.6 kg)   SpO2 98%   BMI 28.98 kg/m²   General appearance: No apparent distress, appears stated age and cooperative. HEENT: Normal cephalic, atraumatic without obvious deformity. Pupils equal, round, and reactive to light. Extra ocular muscles intact. Conjunctivae/corneas clear. Neck: Supple, with full range of motion. No jugular venous distention. Trachea midline. Respiratory:  Normal respiratory effort. Clear to auscultation, bilaterally without Rales/Wheezes/Rhonchi. Cardiovascular: Regular rate and rhythm with normal S1/S2 without murmurs, rubs or gallops. Abdomen: Soft, non-tender, non-distended with normal bowel sounds. Musculoskeletal:  No clubbing, cyanosis or edema bilaterally. Skin: Skin color, texture, turgor normal.  No rashes or lesions. Neurologic:  Neurovascularly intact without any focal sensory/motor deficits.  Cranial nerves: II-XII intact, grossly non-focal.  Psychiatric: Alert and oriented, thought content appropriate, normal insight  Capillary Refill: Brisk,< 3 seconds   Peripheral Pulses: +2 palpable, equal bilaterally     Labs:   Recent Labs     07/21/22 1702   WBC 11.9*   HGB 12.7*   HCT 41.1*        Recent Labs     07/21/22 1702      K 4.5      CO2 19*   BUN 13   CREATININE 0.8   CALCIUM 8.5     No results for input(s): AST, ALT, BILIDIR, BILITOT, ALKPHOS in the last 72 hours. No results for input(s): INR in the last 72 hours. No results for input(s): Corry Comber in the last 72 hours. Urinalysis:    Lab Results   Component Value Date/Time    NITRU NEGATIVE 06/16/2022 11:26 AM    WBCUA > 100 06/16/2022 11:26 AM    BACTERIA MANY 06/16/2022 11:26 AM    RBCUA 3-5 06/16/2022 11:26 AM    BLOODU NEGATIVE 06/16/2022 11:26 AM    SPECGRAV 1.015 11/06/2018 05:15 AM    GLUCOSEU NEGATIVE 06/16/2022 11:26 AM       Radiology:   XR HIP RIGHT (2-3 VIEWS)   Final Result   1. Status post recent right total hip arthroplasty without evidence of    hardware complication. This document has been electronically signed by: Arik Crabtree MD on    07/21/2022 06:57 PM        XR HIP RIGHT (2-3 VIEWS)    Result Date: 7/21/2022  2 views of the right hip. COMPARISON: None FINDINGS: Small amount of subcutaneous gas overlies the lateral aspect of the right hip. Right total hip arthroplasty appears in anatomic alignment. No evidence of hardware failure or loosening. [place] portions of the right hemipelvis appear intact. 1. Status post recent right total hip arthroplasty without evidence of hardware complication. This document has been electronically signed by: Arik Crabtree MD on 07/21/2022 06:57 PM        EKG: sinus bradycardia, rate=54 and 1st degree heart block      Thank you for allowing us to participate in this patient's care. Please do not hesitate to call us directly with further questions.      Electronically signed by Margaret Christy PA-C on 7/21/2022 at 8:56 PM

## 2022-07-22 NOTE — PROGRESS NOTES
[address] [address]  INPATIENT PHYSICAL THERAPY  EVALUATION  [facility] ORTHOPEDICS 7K - 7K-02/002-A    Time In: 0815  Time Out: 0264  Timed Code Treatment Minutes: 28 Minutes  Minutes: 37          Date: 2022  Patient Name: Fausto Dozier,  Gender:  male        MRN: [id number]  : 1950  (74 y.o.)      Referring Practitioner: LAURA Claros CNP  Diagnosis: OA of R hip  Additional Pertinent Hx: Per EMR \"The patient is a 74 y.o. male  who presents with chronic right hip pain. Known to have bilateral hip pain with xrays demonstrating severe osteoarthritis end stage disease. Underwent a left total hip arthroplasty in 2021 and did well with that. He has undergone intra-articular corticosteroid injections to the right hip however those have offered less and less relief. He is having increasing difficulty with activities of daily living and a decrease in quality of life secondary to his right hip pain. Significant pain with ambulating for long distances and long periods of time. Difficulty getting up from a seated position. Endorses nocturnal pain. Secondary to the failure of conservative management, worsening symptoms, impact on his quality of life, and considering the relief he got from his left total hip arthroplasty a right total hip arthroplasty was recommended; patient agrees and wishes to proceed. \" P t is s/p R ELIZABETH completed by Dr Tierra Castañeda on . Restrictions/Precautions:  Restrictions/Precautions: General Precautions, Fall Risk, Surgical Protocols  Position Activity Restriction  Hip Precautions: No hip flexion > 90 degrees, No ADduction, No hip internal rotation    Subjective:  Chart Reviewed: Yes  Patient assessed for rehabilitation services?: Yes  Family / Caregiver Present: No  Subjective: Ok to see pt per nursing. Pt sitting on side of bed when PT arrived, pleasant and agreeable to PT session, wishing to go home today.  Pt required cues for re-direction of tasks due to increase distraction noted during session. General:  Overall Orientation Status: Within Functional Limits  Orientation Level: Oriented X4  Vision: Within Functional Limits  Hearing: Within functional limits       Pain: 2-3/10: R hip with mobility    Vitals: Vitals not assessed per clinical judgement, see nursing flowsheet    Social/Functional History:    Lives With: Spouse  Type of Home: House  Home Layout: Two level, Performs ADL's on one level  Home Access: Stairs to enter with rails  Entrance Stairs - Number of Steps: 3 KEYANA  Entrance Stairs - Rails: Both  Home Equipment: Rollator, Walker, rolling     Bathroom Shower/Tub: Walk-in shower  Bathroom Toilet: Standard  Bathroom Equipment: Shower chair, Toilet raiser, Grab bars in shower    Receives Help From: Family  ADL Assistance: Independent  Homemaking Assistance: Needs assistance  Ambulation Assistance: Independent  Transfer Assistance: Independent    Active : Yes  Occupation: Retired  Additional Comments: Uses [place] for [place] or longer distances. Wife completes cooking and cleaning tasks. reports he has early stage dementia    OBJECTIVE:  Range of Motion:  Bilateral Lower Extremity: WFL  Completed within hip precautions  Strength:  Bilateral Lower Extremity: Impaired - grossly deconditioned  Completed within hip precautions  Balance:  Static Sitting Balance:  Supervision  Static Standing Balance: Stand By Assistance  RW for support once in standing, no LOB noted  Bed Mobility:  Not Tested    Transfers:  Sit to Stand: Stand By Assistance, X 1, with verbal cues  Stand to Sit:Stand By Assistance, X 1, with verbal cues  RW for support, no LOB noted. Pt completed transfers from various surfaces and heights during session with fair safety  *pt educated on car transfer with demo from PT.    Ambulation:  Stand By Assistance, X 1, with cues for safety, with verbal cues , with increased time for completion  Distance: 240 feet  Surface: Level Tile  Device:Rolling Walker  Gait Deviations: Forward Flexed Posture, Slow Anitra, Decreased Step Length Bilaterally, Decreased Weight Shift Right, Decreased Gait Speed, and Decreased Heel Strike on Right  Cues required for overall safety, no LOB noted with completion, increased time as pt required cues to maintain on task with increased distraction noted. Stairs:  Stand By Assistance, X 1, with cues for safety, with verbal cues   Number of Steps: 4  Height: 6\" step with Bilateral Handrails  Educated on proper technique of completion with good demo and a step to pattern with mobility  Functional Outcome Measures: Completed  AM-PAC Inpatient Mobility Raw Score : 18  AM-PAC Inpatient T-Scale Score : 43.63    ASSESSMENT:  Activity Tolerance:  Patient tolerance of  treatment: good. Treatment Initiated: Treatment and education initiated within context of evaluation. Evaluation time included review of current medical information, gathering information related to past medical, social and functional history, completion of standardized testing, formal and informal observation of tasks, assessment of data and development of plan of care and goals. Treatment time included skilled education and facilitation of tasks to increase safety and independence with functional mobility for improved independence and quality of life. Assessment: Body Structures, Functions, Activity Limitations Requiring Skilled Therapeutic Intervention: Decreased functional mobility , Decreased endurance, Increased pain, Decreased balance, Decreased posture, Decreased strength, Decreased safe awareness, Decreased cognition  Assessment: Pt presents with the deficits above, reports increased pain and weankness in R LE, however good demo of overall mobility. Pt cont to require skilled PT services to increase IND with functional tasks and progress towards PLOF safely. Pt would benefit from [facility] services once returns home.   Therapy Prognosis: Good    Requires PT Follow-Up: Yes    Discharge Recommendations:  Discharge Recommendations: Continue to assess pending progress, Home with Home health PT, Patient would benefit from continued therapy after discharge  Pt requesting [facility] services  Patient Education:      . Patient Education  Education Given To: Patient  Education Provided: Role of Therapy, Plan of Care, Transfer Training, Equipment  Education Provided Comments: d/c planning  Education Method: Verbal  Education Outcome: Verbalized understanding, Continued education needed     Extensive education on R ELIZABETH precautions with verbal understanding  Equipment Recommendations:  Equipment Needed: No    Plan:  Current Treatment Recommendations: Balance training, Gait training, Strengthening, Equipment evaluation, education, & procurement, Stair training, Functional mobility training, Neuromuscular re-education, Transfer training, Endurance training, Patient/Caregiver education & training, Safety education & training, Home exercise program  Plan:  (6x O)    Goals:  Patient goals : return home with [facility]  Short Term Goals  Time Frame for Short term goals: by discharge  Short term goal 1: Pt will am for >200 feet with IND with good safety and RW for support to progress towards PLOF. Short term goal 2: Pt will demo S for stair negotiation with B rails for support and good technique to progress towards PLOF. Short term goal 3: Pt will demo IND with transfers with RW for support and good safety to progress towards PLOF. Short term goal 4: Pt will demo S for car transfer with good technique to return home safely. Long Term Goals  Time Frame for Long term goals : NA due to short ELOS    Following session, patient left in safe position with all fall risk precautions in place. In chair, all needs in reach, alarm on.

## 2022-07-22 NOTE — PROGRESS NOTES
Pt to 7k02 from same day surgery via bed. No family at pt side. Call light placed within reach. 0.9 infusing at 100 ml/hr with aprpox 800 ml to count. No barajas or drains noted. See flowsheet for further info.

## 2022-07-22 NOTE — PROGRESS NOTES
Orthopaedic Progress Note      SUBJECTIVE   Mr. Adarsh De León is post op day #1 R ELIZABETH, [place]    Seen at bedside this morning, NAEON  Pain well controlled, tolerating oral diet  Denies any numbness/paresthesia in operative extremity  Has not yet worked with PT  Y[facility] Banegas      OBJECTIVE      Physical    VITALS:  /82   Pulse 53   Temp 97.3 °F (36.3 °C) (Oral)   Resp 16   Ht 5' 10\" (1.778 m)   Wt 202 lb (91.6 kg)   SpO2 96%   BMI 28.98 kg/m²   I/O last 3 completed shifts: In: 1500 [P.O.:200;  I.V.:1300]  Out: 1800 [Urine:1600; Blood:200]      2/10 pain  RLE incision c/d/I, no drainage, no bandage saturation  Sensation to light touch intact  Gastroc TA EHL intact  Distal pulses palpable, warm well perfused  Calf supple nontender to palpation       Data  CBC:   Lab Results   Component Value Date/Time    WBC 11.9 07/21/2022 05:02 PM    HGB 12.2 07/22/2022 07:02 AM     07/22/2022 07:02 AM     BMP:    Lab Results   Component Value Date/Time     07/21/2022 05:02 PM    K 4.5 07/21/2022 05:02 PM     07/21/2022 05:02 PM    CO2 19 07/21/2022 05:02 PM    BUN 13 07/21/2022 05:02 PM    CREATININE 0.8 07/21/2022 05:02 PM    CALCIUM 8.5 07/21/2022 05:02 PM    GLUCOSE 282 07/21/2022 05:02 PM     Uric Acid:  No components found for: URIC  PT/INR:    Lab Results   Component Value Date/Time    INR 0.90 12/26/2018 11:00 AM     Troponin:  No results found for: TROPONINI  Urine Culture:  No components found for: CHETNA      Current Inpatient Medications    Current Facility-Administered Medications: amLODIPine (NORVASC) tablet 10 mg, 10 mg, Oral, Daily  aspirin EC tablet 81 mg, 81 mg, Oral, Daily  atorvastatin (LIPITOR) tablet 80 mg, 80 mg, Oral, Daily  cilostazol (PLETAL) tablet 100 mg, 100 mg, Oral, BID  clopidogrel (PLAVIX) tablet 75 mg, 75 mg, Oral, Daily  donepezil (ARICEPT) tablet 5 mg, 5 mg, Oral, Nightly  guaiFENesin-codeine (GUAIFENESIN AC) 100-10 MG/5ML liquid 5 mL, 5 mL, Oral, Q4H PRN  hydroCHLOROthiazide protocol  Continue PT/OT  Dvt ppx  Dispo- [facility] pending PT eval today   Medicine consulted, appreciate recs

## 2022-07-22 NOTE — DISCHARGE SUMMARY
Physician Discharge Summary     Patient ID:  Lola Ennis  [id number]  13 y.o.  1950    Admit date: 7/21/2022    Discharge date and time: 7/22/22    Admitting Physician: Yandy Darnell MD     Discharge Physician: Yandy Darnell MD     Admission Diagnoses: Osteoarthritis of right hip, unspecified osteoarthritis type [M16.11]    Discharge Diagnoses: Osteoarthritis of right hip, unspecified osteoarthritis type [M16.11]    Admission Condition: good    Discharged Condition: good    Indication for Admission: [facility] Course: Patient is now s/p R ELIZABETH on 7/21/22 by Dr. Lopez Onofre. On the day of surgery, patient was identified in the pre-operative holding area and agreeable to proceed with surgery. Written consent was obtained. Please see operative note for further details of this procedure. Patient received chan-operative antibiotics. Patient recovered in PACU before transfer to a regular nursing floor. Patient was started on tylenol and norco for pain control and resumed plavix for dvt prophylaxis. On the day of discharge, patient was afebrile with stable vital signs, stable upon physical exam. Patient was discharged with prescriptions for pain. Patient was deemed stable for discharge to home with [facility]  as recommended by PT/OT. Patient will follow up with Dr Lopez Onofre in 8 weeks weeks for post-operative visit.      Consults: medicine    Discharge Exam:  Please see most recent progress note for appropriate discharge exam      Disposition: home with [facility]    In process/preliminary results:  Outstanding Order Results       Date and Time Order Name Status Description    7/22/2022  7:02 AM Hemoglobin A1c In process     7/22/2022  7:02 AM CBC with Auto Differential Preliminary             Patient Instructions:   Current Discharge Medication List        CONTINUE these medications which have NOT CHANGED    Details   carvedilol (COREG) 12.5 MG tablet Take 6.25 mg by mouth 2 times daily (with meals) Take am of sx      donepezil (ARICEPT) 10 MG tablet Take 5 mg by mouth nightly      OMEGA 3-6-9 FATTY ACIDS PO Take 2 capsules by mouth 2 times daily Hold 5 days before sx      traZODone (DESYREL) 100 MG tablet Take 100 mg by mouth at bedtime      clopidogrel (PLAVIX) 75 MG tablet Take 75 mg by mouth daily Hold 5 days before sx      guaiFENesin-codeine (TUSSI-ORGANIDIN NR) 100-10 MG/5ML syrup Take 5 mLs by mouth every 4 hours as needed for Cough or Congestion. cilostazol (PLETAL) 100 MG tablet Take 100 mg by mouth 2 times daily Hold 5 days before sx      acetaminophen (TYLENOL) 325 MG tablet Take 2 tablets by mouth every 4 hours as needed for Pain  Qty: 120 tablet, Refills: 3      atorvastatin (LIPITOR) 80 MG tablet Take 1 tablet by mouth daily  Qty: 30 tablet, Refills: 0      pantoprazole (PROTONIX) 40 MG tablet Take 1 tablet by mouth every morning (before breakfast)  Qty: 30 tablet, Refills: 11      albuterol sulfate  (90 Base) MCG/ACT inhaler Inhale 2 puffs into the lungs every 4 hours as needed for Wheezing or Shortness of Breath May take am of sx      tiotropium-olodaterol (STIOLTO) 2.5-2.5 MCG/ACT AERS Inhale 2 puffs into the lungs daily May take am of sx      hydrochlorothiazide (HYDRODIURIL) 25 MG tablet Take 25 mg by mouth daily Hold am of sx      nitroGLYCERIN (NITROSTAT) 0.4 MG SL tablet Place 1 tablet under the tongue every 5 minutes as needed for Chest pain  Qty: 25 tablet, Refills: 3      amLODIPine (NORVASC) 10 MG tablet Take 1 tablet by mouth daily  Qty: 30 tablet, Refills: 3      aspirin 81 MG EC tablet Take 81 mg by mouth daily Hold 5 days before sx           Activity: activity as tolerated  Diet: regular diet  Wound Care: keep wound clean and dry    Follow-up with Dr Baltazar Suazo in 8 weeks.     Signed:  Katharina Aburto PA-C    7/22/2022  7:57 AM

## 2022-07-22 NOTE — CARE COORDINATION
7/22/22, 7:35 AM EDT  DISCHARGE PLANNING EVALUATION:    Kari Bellamy       Admitted: 7/21/2022/ Paulie 9 day: 1   Location: -02/002-A Reason for admit: Osteoarthritis of right hip, unspecified osteoarthritis type [M16.11]   PMH:  has a past medical history of Arthritis, Asthma, Blood circulation, collateral, CAD (coronary artery disease), COPD (chronic obstructive pulmonary disease) ([facility] [address] Diabetes mellitus ([facility] [address] GERD (gastroesophageal reflux disease), Hyperlipidemia, Hypertension, Other disorders of kidney and ureter in diseases classified elsewhere, and Psychiatric problem. Procedure: s/p right total hip replacement 7/21. Barriers to Discharge:  PT/OT evals with FWB, hgb 12, hospitalist for DM  and medical mgmt. Resume Plavix. PCP: Maggie Swann MD  Readmission Risk Score: 9.9%  Patient's Healthcare Decision Maker: Named in [address] [place] [address]    Patient Goals/Plan/Treatment Preferences: spoke with Moriah Nohemi; he lives home with wife Quentin Sever. He has needed equipment, PCP, insurance confirmed. He is current with [facility] for aide services only currently. He get medication paid for 100 % thru [place]. Called [place] at [facility] [place]; she shared he is current with aide services only with them. She gave this [facility] number as pt see PCP in [place] who is out of [facility]. 1045 Pt request HH/therapy thru [place]. LM with Lavonne Pabon and Mamta care managers at [facility] [phone] to set up [facility]. Await call back. 1415- patient discharged; ambulated in hallway with therapy and Doloreskasie Gomes PA agrees pt ready for home. AVS, HH order, and required information faxed to [facility] at [phone].    1420- no call back yet from [address] from [facility] called back; updated ondischarge today and [facility]. She will review fax and work with [facility]. The earliest they can go to pt home is Wed. Transportation/Food Security/Housekeeping Addressed:  No issues identified. Lives With: Spouse  Type of Home: House  Home Layout: Two level, Performs ADL's on one level  Home Access: Stairs to enter with rails  Entrance Stairs - Number of Steps: 3 KEYANA  Entrance Stairs - Rails: Both  Home Equipment: Rollator, Walker, rolling      Bathroom Shower/Tub: Walk-in shower  Bathroom Toilet: Standard  Bathroom Equipment: Shower chair, Toilet raiser, Grab bars in shower          7/22/22, 2:22 PM EDT    Patient goals/plan/ treatment preferences discussed by  and . Patient goals/plan/ treatment preferences reviewed with patient/ family. Patient/ family verbalize understanding of discharge plan and are in agreement with goal/plan/treatment preferences. Understanding was demonstrated using the teach back method. AVS provided by RN at time of discharge, which includes all necessary medical information pertaining to the patients current course of illness, treatment, post-discharge goals of care, and treatment preferences.      Services At/After Discharge: Home Health, Nursing service, OT, and PT

## 2022-07-23 NOTE — PROGRESS NOTES
Pt given discharge instructions with permission to allow wife to remain in room during teaching obtained. No rx sent with pt. joaquin Rivera to send to pt [facility]. Follow up advised. All personal belongings sent with pt. To car via wheelchair.

## 2022-07-26 NOTE — CARE COORDINATION
7/26/22  9:21 AM  Received call from Rolf Ward at [facility], request AVS and HH order. Information faxed.

## 2022-12-17 ENCOUNTER — HOSPITAL ENCOUNTER (INPATIENT)
Age: 72
LOS: 2 days | Discharge: HOME HEALTH | DRG: 607 | End: 2022-12-19
Attending: EMERGENCY MEDICINE | Admitting: HOSPITALIST
Payer: OTHER GOVERNMENT

## 2022-12-17 DIAGNOSIS — J98.59 MEDIASTINAL MASS: Primary | ICD-10-CM

## 2022-12-17 LAB
ANION GAP SERPL CALCULATED.3IONS-SCNC: 13 MEQ/L (ref 8–16)
BASOPHILS # BLD: 0.3 %
BASOPHILS ABSOLUTE: 0.1 THOU/MM3 (ref 0–0.1)
BUN BLDV-MCNC: 19 MG/DL (ref 7–22)
CALCIUM SERPL-MCNC: 9.5 MG/DL (ref 8.5–10.5)
CHLORIDE BLD-SCNC: 103 MEQ/L (ref 98–111)
CO2: 23 MEQ/L (ref 23–33)
CREAT SERPL-MCNC: 0.9 MG/DL (ref 0.4–1.2)
ELLIPTOCYTES: ABNORMAL
EOSINOPHIL # BLD: 1.9 %
EOSINOPHILS ABSOLUTE: 0.5 THOU/MM3 (ref 0–0.4)
ERYTHROCYTE [DISTWIDTH] IN BLOOD BY AUTOMATED COUNT: 13.7 % (ref 11.5–14.5)
ERYTHROCYTE [DISTWIDTH] IN BLOOD BY AUTOMATED COUNT: 44.5 FL (ref 35–45)
GFR SERPL CREATININE-BSD FRML MDRD: > 60 ML/MIN/1.73M2
GLUCOSE BLD-MCNC: 108 MG/DL (ref 70–108)
GLUCOSE BLD-MCNC: 134 MG/DL (ref 70–108)
GLUCOSE BLD-MCNC: 134 MG/DL (ref 70–108)
HCT VFR BLD CALC: 34.6 % (ref 42–52)
HEMOGLOBIN: 10.8 GM/DL (ref 14–18)
IMMATURE GRANS (ABS): 0.19 THOU/MM3 (ref 0–0.07)
IMMATURE GRANULOCYTES: 0.8 %
INR BLD: 1.06 (ref 0.85–1.13)
LYMPHOCYTES # BLD: 8.1 %
LYMPHOCYTES ABSOLUTE: 2 THOU/MM3 (ref 1–4.8)
MCH RBC QN AUTO: 27.9 PG (ref 26–33)
MCHC RBC AUTO-ENTMCNC: 31.2 GM/DL (ref 32.2–35.5)
MCV RBC AUTO: 89.4 FL (ref 80–94)
MONOCYTES # BLD: 5.1 %
MONOCYTES ABSOLUTE: 1.2 THOU/MM3 (ref 0.4–1.3)
NUCLEATED RED BLOOD CELLS: 0 /100 WBC
OSMOLALITY CALCULATION: 281.8 MOSMOL/KG (ref 275–300)
PLATELET # BLD: 276 THOU/MM3 (ref 130–400)
PMV BLD AUTO: 11.5 FL (ref 9.4–12.4)
POIKILOCYTES: ABNORMAL
POTASSIUM SERPL-SCNC: 4.1 MEQ/L (ref 3.5–5.2)
RBC # BLD: 3.87 MILL/MM3 (ref 4.7–6.1)
SCAN OF BLOOD SMEAR: NORMAL
SEG NEUTROPHILS: 83.8 %
SEGMENTED NEUTROPHILS ABSOLUTE COUNT: 20.4 THOU/MM3 (ref 1.8–7.7)
SODIUM BLD-SCNC: 139 MEQ/L (ref 135–145)
WBC # BLD: 24.4 THOU/MM3 (ref 4.8–10.8)

## 2022-12-17 PROCEDURE — 82948 REAGENT STRIP/BLOOD GLUCOSE: CPT

## 2022-12-17 PROCEDURE — 99285 EMERGENCY DEPT VISIT HI MDM: CPT

## 2022-12-17 PROCEDURE — 2060000000 HC ICU INTERMEDIATE R&B

## 2022-12-17 PROCEDURE — 99223 1ST HOSP IP/OBS HIGH 75: CPT | Performed by: HOSPITALIST

## 2022-12-17 PROCEDURE — 85610 PROTHROMBIN TIME: CPT

## 2022-12-17 PROCEDURE — 85025 COMPLETE CBC W/AUTO DIFF WBC: CPT

## 2022-12-17 PROCEDURE — 36415 COLL VENOUS BLD VENIPUNCTURE: CPT

## 2022-12-17 PROCEDURE — 80048 BASIC METABOLIC PNL TOTAL CA: CPT

## 2022-12-17 RX ORDER — ACETAMINOPHEN 325 MG/1
650 TABLET ORAL EVERY 6 HOURS PRN
Status: DISCONTINUED | OUTPATIENT
Start: 2022-12-17 | End: 2022-12-18 | Stop reason: SDUPTHER

## 2022-12-17 RX ORDER — SODIUM CHLORIDE 0.9 % (FLUSH) 0.9 %
5-40 SYRINGE (ML) INJECTION EVERY 12 HOURS SCHEDULED
Status: DISCONTINUED | OUTPATIENT
Start: 2022-12-17 | End: 2022-12-19 | Stop reason: HOSPADM

## 2022-12-17 RX ORDER — SODIUM CHLORIDE 0.9 % (FLUSH) 0.9 %
5-40 SYRINGE (ML) INJECTION PRN
Status: DISCONTINUED | OUTPATIENT
Start: 2022-12-17 | End: 2022-12-19 | Stop reason: HOSPADM

## 2022-12-17 RX ORDER — DEXTROSE MONOHYDRATE 100 MG/ML
INJECTION, SOLUTION INTRAVENOUS CONTINUOUS PRN
Status: DISCONTINUED | OUTPATIENT
Start: 2022-12-17 | End: 2022-12-19 | Stop reason: HOSPADM

## 2022-12-17 RX ORDER — INSULIN LISPRO 100 [IU]/ML
0-4 INJECTION, SOLUTION INTRAVENOUS; SUBCUTANEOUS
Status: DISCONTINUED | OUTPATIENT
Start: 2022-12-17 | End: 2022-12-19 | Stop reason: HOSPADM

## 2022-12-17 RX ORDER — INSULIN LISPRO 100 [IU]/ML
0-4 INJECTION, SOLUTION INTRAVENOUS; SUBCUTANEOUS NIGHTLY
Status: DISCONTINUED | OUTPATIENT
Start: 2022-12-17 | End: 2022-12-19 | Stop reason: HOSPADM

## 2022-12-17 RX ORDER — SODIUM CHLORIDE 9 MG/ML
INJECTION, SOLUTION INTRAVENOUS PRN
Status: DISCONTINUED | OUTPATIENT
Start: 2022-12-17 | End: 2022-12-19 | Stop reason: HOSPADM

## 2022-12-17 RX ORDER — POLYETHYLENE GLYCOL 3350 17 G/17G
17 POWDER, FOR SOLUTION ORAL DAILY PRN
Status: DISCONTINUED | OUTPATIENT
Start: 2022-12-17 | End: 2022-12-19 | Stop reason: HOSPADM

## 2022-12-17 RX ORDER — ONDANSETRON 4 MG/1
4 TABLET, ORALLY DISINTEGRATING ORAL EVERY 8 HOURS PRN
Status: DISCONTINUED | OUTPATIENT
Start: 2022-12-17 | End: 2022-12-19 | Stop reason: HOSPADM

## 2022-12-17 RX ORDER — ONDANSETRON 2 MG/ML
4 INJECTION INTRAMUSCULAR; INTRAVENOUS EVERY 6 HOURS PRN
Status: DISCONTINUED | OUTPATIENT
Start: 2022-12-17 | End: 2022-12-19 | Stop reason: HOSPADM

## 2022-12-17 ASSESSMENT — PAIN DESCRIPTION - FREQUENCY
FREQUENCY: CONTINUOUS

## 2022-12-17 ASSESSMENT — LIFESTYLE VARIABLES
HOW OFTEN DO YOU HAVE A DRINK CONTAINING ALCOHOL: NEVER
HOW MANY STANDARD DRINKS CONTAINING ALCOHOL DO YOU HAVE ON A TYPICAL DAY: PATIENT DOES NOT DRINK

## 2022-12-17 ASSESSMENT — ENCOUNTER SYMPTOMS
SHORTNESS OF BREATH: 0
TROUBLE SWALLOWING: 0
DIARRHEA: 0
EYE PAIN: 0
ABDOMINAL DISTENTION: 0
ABDOMINAL PAIN: 0
WHEEZING: 0
BLOOD IN STOOL: 0
VOMITING: 0
COUGH: 1
PHOTOPHOBIA: 0
EYE REDNESS: 0
NAUSEA: 0
SINUS PAIN: 0
CONSTIPATION: 0
CHEST TIGHTNESS: 0
EYE ITCHING: 0
SORE THROAT: 0
STRIDOR: 0
CHOKING: 0
BACK PAIN: 0

## 2022-12-17 ASSESSMENT — PAIN - FUNCTIONAL ASSESSMENT
PAIN_FUNCTIONAL_ASSESSMENT: NONE - DENIES PAIN
PAIN_FUNCTIONAL_ASSESSMENT: 0-10
PAIN_FUNCTIONAL_ASSESSMENT: 0-10
PAIN_FUNCTIONAL_ASSESSMENT: NONE - DENIES PAIN
PAIN_FUNCTIONAL_ASSESSMENT: NONE - DENIES PAIN
PAIN_FUNCTIONAL_ASSESSMENT: 0-10

## 2022-12-17 ASSESSMENT — PAIN DESCRIPTION - PAIN TYPE
TYPE: ACUTE PAIN

## 2022-12-17 ASSESSMENT — PAIN SCALES - GENERAL
PAINLEVEL_OUTOF10: 0
PAINLEVEL_OUTOF10: 3
PAINLEVEL_OUTOF10: 4
PAINLEVEL_OUTOF10: 4
PAINLEVEL_OUTOF10: 0
PAINLEVEL_OUTOF10: 1

## 2022-12-17 ASSESSMENT — PAIN DESCRIPTION - ONSET
ONSET: ON-GOING
ONSET: ON-GOING
ONSET: GRADUAL
ONSET: ON-GOING

## 2022-12-17 ASSESSMENT — PAIN DESCRIPTION - LOCATION
LOCATION: CHEST

## 2022-12-17 NOTE — ED NOTES
Pt vitals collected. Pt denies any needs at this time. Pt respirations easy and unlabored.      Marilee Camargo RN  12/17/22 3754

## 2022-12-17 NOTE — ED PROVIDER NOTES
[place] ENCOUNTER          Pt Name: Luis Manuel Whatley  MRN: [id number]  [place] 1950  Date of evaluation: 12/17/2022  Treating Resident Physician: Bebeto Stevens MD  Supervising Physician: Maggie Hyatt MD    History obtained from chart review and the patient. CHIEF COMPLAINT       Chief Complaint   Patient presents with    Chest Pain    Other     Lung mass (tumor)           HISTORY OF PRESENT ILLNESS    HPI  Luis Manuel Whatley is a 74 y.o. male who presents to the emergency department for evaluation of mediastinal mass. Patient is a transfer from [facility] where I spoke with Dr. Nito Wu and accepted the transfer. Patient went to [facility] ER for evaluation of midline chest pain that radiated to his back that he been feeling for the past 2 to 3 days. Patient is states there was associated cough with this and was checked for COVID and flu at [facility].  Patient's lab values were all accessible and images were then sent over as well and were visible for review. Patient is currently denying having any chest pain, shortness of breath, nausea, vomiting, fever. The patient has no other acute complaints at this time. REVIEW OF SYSTEMS   Review of Systems   Constitutional:  Negative for appetite change, chills, diaphoresis, fatigue, fever and unexpected weight change. HENT:  Negative for dental problem, drooling, ear discharge, ear pain, hearing loss, mouth sores, sinus pain, sneezing, sore throat and trouble swallowing. Eyes:  Negative for photophobia, pain, redness and itching. Respiratory:  Positive for cough. Negative for choking, chest tightness, shortness of breath, wheezing and stridor. Cardiovascular:  Positive for chest pain. Negative for palpitations and leg swelling. Gastrointestinal:  Negative for abdominal distention, abdominal pain, blood in stool, constipation, diarrhea, nausea and vomiting. Endocrine: Negative for polydipsia, polyphagia and polyuria. Genitourinary:  Negative for difficulty urinating, dysuria, flank pain, genital sores, penile discharge, testicular pain and urgency. Musculoskeletal:  Negative for back pain, myalgias and neck pain. Skin:  Negative for pallor, rash and wound. Neurological:  Negative for dizziness, tremors, seizures, syncope, numbness and headaches. Psychiatric/Behavioral:  Negative for confusion, dysphoric mood, self-injury and suicidal ideas. The patient is not nervous/anxious and is not hyperactive. PAST MEDICAL AND SURGICAL HISTORY     Past Medical History:   Diagnosis Date    Arthritis     Asthma     Blood circulation, collateral     bilateral LE    CAD (coronary artery disease)     COPD (chronic obstructive pulmonary disease) ([facility])     Diabetes mellitus ([facility] [address]     in the past    GERD (gastroesophageal reflux disease)     Hyperlipidemia     Hypertension     Other disorders of kidney and ureter in diseases classified elsewhere     Psychiatric problem      Past Surgical History:   Procedure Laterality Date    CARDIAC CATHETERIZATION      CAROTID ENDARTERECTOMY Left 12/26/2018    LEFT CAROTID ENDARTERECTOMY [facility]      right eye for detached retina    TONSILLECTOMY      TOTAL HIP ARTHROPLASTY Right 7/21/2022    RIGHT TOTAL HIP REPLACEMENT performed by Abdoul Brady MD at [place] [address]   No current facility-administered medications for this encounter.     Current Outpatient Medications:     carvedilol (COREG) 12.5 MG tablet, Take 6.25 mg by mouth 2 times daily (with meals) Take am of sx, Disp: , Rfl:     donepezil (ARICEPT) 10 MG tablet, Take 5 mg by mouth nightly, Disp: , Rfl:     OMEGA 3-6-9 FATTY ACIDS PO, Take 2 capsules by mouth 2 times daily Hold 5 days before sx, Disp: , Rfl:     traZODone (DESYREL) 100 MG tablet, Take 100 mg by mouth at bedtime, Disp: , Rfl: clopidogrel (PLAVIX) 75 MG tablet, Take 75 mg by mouth daily Hold 5 days before sx, Disp: , Rfl:     cilostazol (PLETAL) 100 MG tablet, Take 100 mg by mouth 2 times daily Hold 5 days before sx, Disp: , Rfl:     acetaminophen (TYLENOL) 325 MG tablet, Take 2 tablets by mouth every 4 hours as needed for Pain, Disp: 120 tablet, Rfl: 3    atorvastatin (LIPITOR) 80 MG tablet, Take 1 tablet by mouth daily, Disp: 30 tablet, Rfl: 0    pantoprazole (PROTONIX) 40 MG tablet, Take 1 tablet by mouth every morning (before breakfast), Disp: 30 tablet, Rfl: 11    albuterol sulfate  (90 Base) MCG/ACT inhaler, Inhale 2 puffs into the lungs every 4 hours as needed for Wheezing or Shortness of Breath May take am of sx, Disp: , Rfl:     hydrochlorothiazide (HYDRODIURIL) 25 MG tablet, Take 25 mg by mouth daily Hold am of sx, Disp: , Rfl:     nitroGLYCERIN (NITROSTAT) 0.4 MG SL tablet, Place 1 tablet under the tongue every 5 minutes as needed for Chest pain, Disp: 25 tablet, Rfl: 3    amLODIPine (NORVASC) 10 MG tablet, Take 1 tablet by mouth daily, Disp: 30 tablet, Rfl: 3    aspirin 81 MG EC tablet, Take 81 mg by mouth daily Hold 5 days before sx, Disp: , Rfl:       SOCIAL HISTORY     Social History     Social History Narrative    Not on file     Social History     Tobacco Use    Smoking status: Former     Packs/day: 0.75     Years: 20.00     Pack years: 15.00     Types: Cigarettes     Quit date: 2015     Years since quittin.8    Smokeless tobacco: Never   Vaping Use    Vaping Use: Never used   Substance Use Topics    Alcohol use: No    Drug use: No         ALLERGIES   No Known Allergies      FAMILY HISTORY     Family History   Problem Relation Age of Onset    Cancer Mother         bladder    Diabetes Mother     Asthma Father     Diabetes Sister     Heart Disease Brother         MI @ 52    Heart Disease Sister         MI in 45s         PREVIOUS RECORDS   Previous records reviewed:     Enrique 35     ED Triage Vitals [12/17/22 0344]   BP Temp Temp Source Heart Rate Resp SpO2 Height Weight   137/78 98.4 °F (36.9 °C) Oral 82 18 97 % 5' 10\" (1.778 m) 200 lb (90.7 kg)     Initial vital signs and nursing assessment reviewed and normal. Body mass index is 28.7 kg/m². Pulsoximetry is normal per my interpretation. Additional Vital Signs:  Vitals:    12/17/22 0634   BP: (!) 150/78   Pulse: 87   Resp: 18   Temp:    SpO2: 97%       Physical Exam  Vitals and nursing note reviewed. Constitutional:       General: He is not in acute distress. Appearance: Normal appearance. He is not ill-appearing, toxic-appearing or diaphoretic. HENT:      Head: Normocephalic and atraumatic. Right Ear: External ear normal.      Left Ear: External ear normal.      Nose: Nose normal. No congestion or rhinorrhea. Mouth/Throat:      Mouth: Mucous membranes are moist.      Pharynx: Oropharynx is clear. No oropharyngeal exudate or posterior oropharyngeal erythema. Eyes:      General: No scleral icterus. Right eye: No discharge. Left eye: No discharge. Extraocular Movements: Extraocular movements intact. Conjunctiva/sclera: Conjunctivae normal.      Pupils: Pupils are equal, round, and reactive to light. Cardiovascular:      Rate and Rhythm: Normal rate and regular rhythm. Pulses: Normal pulses. Heart sounds: Normal heart sounds. No murmur heard. No gallop. Pulmonary:      Effort: Pulmonary effort is normal. No respiratory distress. Breath sounds: Normal breath sounds. No stridor. No wheezing, rhonchi or rales. Abdominal:      General: Bowel sounds are normal. There is no distension. Palpations: Abdomen is soft. Tenderness: There is no abdominal tenderness. There is no right CVA tenderness, left CVA tenderness, guarding or rebound. Musculoskeletal:         General: No swelling, tenderness, deformity or signs of injury. Cervical back: Neck supple. No rigidity or tenderness. Right lower leg: No edema. Left lower leg: No edema. Lymphadenopathy:      Cervical: No cervical adenopathy. Skin:     General: Skin is warm and dry. Coloration: Skin is not jaundiced or pale. Findings: No bruising, erythema, lesion or rash. Neurological:      General: No focal deficit present. Mental Status: He is alert and oriented to person, place, and time. Mental status is at baseline. Psychiatric:         Mood and Affect: Mood normal.         Behavior: Behavior normal.         Thought Content: Thought content normal.         Judgment: Judgment normal.           MEDICAL DECISION MAKING   Initial Assessment:   75-year-old male transferred to our ED for evaluation of mediastinal mass. Discussed case with Dr. Alfonso Hammond at [facility] and then also discussed with Dr. Kem Xie, [address] [place] [address] surgery. Patient was found to have a large mediastinal mass in the mid SVC. Initially patient's only symptom was chest pain and he was found negative for COVID and flu so further investigation was done finding this mass. I agreed to accept this patient from Dr. Alfonso Hammond who had already spoken with Dr. Kem Xie who agreed to be consulted on the case. Upon patient's arrival imaging needed to be obtained from [facility] which they later sent over. I spoke with Dr. Kem Xie who advised to admit the patient to medicine and he would consult and see the patient in the morning. I discussed the case with the hospitalist who agreed to pass the case out to morning shift for admission. Plan:   Accepted admission  Review previous lab work  Admit patient        ED RESULTS   Laboratory results:  Labs Reviewed - No data to display    Radiologic studies results:  CT INTERPRETATION OF OUTSIDE IMAGES   Final Result   Impression:   1. No evidence of pulmonary embolus. 2. Descending thoracic aortic aneurysm with ulcerated plaque within the    distal thoracic aorta.    3. Large mediastinal mass which encases the superior vena cava and the    distal right pulmonary artery, consistent with malignancy. 4. Right upper lobe pulmonary nodule which may represent a focus of    metastatic disease   5. Small endobronchial filling defect at the origin of the right mainstem    bronchus as discussed above.      ___________________________________      CT angiogram of the abdomen and pelvis with contrast      Comparison: None      Findings: There is ectasia and atherosclerotic calcification throughout the    abdominal aorta. There is aneurysmal dilatation of the infrarenal abdominal aorta which has    a transverse dimension of 3.6 cm. There is also diffuse aneurysmal    dilatation of the right common iliac artery which has a transverse    dimension of 1.8 cm. The pelvic vasculature is patent with multifocal areas of moderate    stenosis within the bilateral common iliac arteries and right external    iliac artery. Atherosclerotic plaque results in moderate to severe narrowing at the    origin of the right renal artery and moderate narrowing at the origin of    the left renal artery. 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 There is a focal area of mild to moderate stenosis near the origin of the    celiac artery. The inferior mesenteric artery is clearly visualized and may be occluded. There is a too small to characterize low-density lesion within the    anterior segment of the right hepatic lobe. The liver is otherwise    homogeneous. Multiple gallstones are present. The pancreas, spleen and visualized portions of the adrenal glands are    normal in appearance. There are small well-circumscribed low-density lesions in both kidneys    which are likely to represent cysts. In addition, there is a poorly defined heterogeneous lesion arising from    the upper pole of the right kidney which measures approximately 1.9 x 2.2    x 1.4 cm in size.  This latter finding is concerning for a neoplasm. There is no bowel distention or displacement. Distal colonic    diverticulosis is present. A moderate to large stool burden is present. There is limited assessment of the pelvis due to streak artifact from    bilateral total hip prostheses. The visualized portions of the urinary bladder are unremarkable. There are no acute bony abnormalities. No soft tissue abnormalities are present. Impression:   1. Infrarenal abdominal aortic aneurysm with a transverse dimension of 3.6    cm. Extensive atherosclerotic disease with multifocal areas of vessel stenosis    as outlined above. 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 2. Possible right renal neoplasm. The lesion in the upper pole of the    right kidney could be further assessed with MRI as clinically warranted. 3. Cholelithiasis. 4. Moderate to large burden. Colonic diverticulosis. This document has been electronically signed by: Matthew Barclay MD on    12/17/2022 06:14 AM      All CTs at this facility use dose modulation techniques and iterative    reconstructions, and/or weight-based dosing   when appropriate to reduce radiation to a low as reasonably achievable. 3D Post-processing was performed on this study. ED Medications administered this visit: Medications - No data to display      ED COURSE     ED Course as of 12/17/22 0702   Sat Dec 17, 2022   7091 Spoke with Dr. Navjot Diaz surgery who advised to admit the patient to the hospitalist and he would be consulted on this patient seen later today in the hospital. [AA]      ED Course User Index  [AA] Marquita Min MD         MEDICATION CHANGES     New Prescriptions    No medications on file         FINAL DISPOSITION     Final diagnoses:   Mediastinal mass     Condition: condition: good  Dispo:  Admit to hospitalist

## 2022-12-17 NOTE — ED NOTES
ED to inpatient nurses report    Chief Complaint   Patient presents with    Chest Pain    Other     Lung mass (tumor)      Present to ED from home  LOC: alert and orientated to name, place, date  Vital signs   Vitals:    12/17/22 0756 12/17/22 0841 12/17/22 0941 12/17/22 1108   BP: 122/74 134/70 132/67 138/74   Pulse: 82 80 82 86   Resp: 18 18 18 18   Temp:       TempSrc:       SpO2: 96% 96% 97% 95%   Weight:       Height:          Oxygen Baseline Room Air    Current needs required Room Air Bipap/Cpap No  LDAs:   Peripheral IV 12/17/22 Right Antecubital (Active)   Site Assessment Clean, dry & intact 12/17/22 0634   Line Status Flushed 12/17/22 0634   Phlebitis Assessment No symptoms 12/17/22 0634   Infiltration Assessment 0 12/17/22 0634   Dressing Status Clean, dry & intact 12/17/22 0634   Dressing Type Transparent 12/17/22 0634     Mobility: Independent  Pending ED orders: NA  Present condition: Pt resting on bedside chair. Pt is A&Ox4, resps easy and unlabored. IV shows no s/s of infection or infiltration.   Person of contract, phone number   Our promise was given to patient    C-SSRS    Swallow Screening    Preferred Language: English     Electronically signed by Amada Layton RN on 12/17/2022 at 12:33 PM       Amada Layton RN  12/17/22 1719

## 2022-12-17 NOTE — PLAN OF CARE
Problem: Chronic Conditions and Co-morbidities  Goal: Patient's chronic conditions and co-morbidity symptoms are monitored and maintained or improved  Recent Flowsheet Documentation  Taken 12/17/2022 1326 by Jessica Kohler 34 - Patient's Chronic Conditions and Co-Morbidity Symptoms are Monitored and Maintained or Improved: Monitor and assess patient's chronic conditions and comorbid symptoms for stability, deterioration, or improvement     Problem: Discharge Planning  Goal: Discharge to home or other facility with appropriate resources  Flowsheets  Taken 12/17/2022 1609  Discharge to home or other facility with appropriate resources:   Identify barriers to discharge with patient and caregiver   Identify discharge learning needs (meds, wound care, etc)  Taken 12/17/2022 1326  Discharge to home or other facility with appropriate resources: Identify barriers to discharge with patient and caregiver     Problem: Pain  Goal: Verbalizes/displays adequate comfort level or baseline comfort level  Flowsheets (Taken 12/17/2022 1725)  Verbalizes/displays adequate comfort level or baseline comfort level:   Encourage patient to monitor pain and request assistance   Assess pain using appropriate pain scale     Problem: Safety - Adult  Goal: Free from fall injury  Flowsheets (Taken 12/17/2022 1725)  Free From Fall Injury:   Instruct family/caregiver on patient safety   Based on caregiver fall risk screen, instruct family/caregiver to ask for assistance with transferring infant if caregiver noted to have fall risk factors     Problem: Confusion  Goal: Confusion, delirium, dementia, or psychosis is improved or at baseline  Description: INTERVENTIONS:  1. Assess for possible contributors to thought disturbance, including medications, impaired vision or hearing, underlying metabolic abnormalities, dehydration, psychiatric diagnoses, and notify attending LIP  2. [place] high risk fall precautions, as indicated  3.  Provide frequent short contacts to provide reality reorientation, refocusing and direction  4. Decrease environmental stimuli, including noise as appropriate  5. Monitor and intervene to maintain adequate nutrition, hydration, elimination, sleep and activity  6. If unable to ensure safety without constant attention obtain sitter and review sitter guidelines with assigned personnel  7.  Initiate Psychosocial CNS and Spiritual Care consult, as indicated  Flowsheets (Taken 12/17/2022 9454)  Effect of thought disturbance (confusion, delirium, dementia, or psychosis) are managed with adequate functional status:   Assess for contributors to thought disturbance, including medications, impaired vision or hearing, underlying metabolic abnormalities, dehydration, psychiatric diagnoses, notify [facility] high risk fall precautions, as indicated   Provide frequent short contacts to provide reality reorientation, refocusing and direction   Decrease environmental stimuli, including noise as appropriate   Monitor and intervene to maintain adequate nutrition, hydration, elimination, sleep and activity

## 2022-12-17 NOTE — ED NOTES
Pt vitals collected. Pt provided with orange juice at this time. Pt respirations easy and unlabored.      Mattie Tong RN  12/17/22 9255

## 2022-12-17 NOTE — H&P
History & Physical    Patient:  Sarah Carlton  YOB: 1950  Date of Service: 12/17/2022  MRN: [id number]   Acct:  [de-identified]   Primary Care Physician: Kell Singer MD    Chief Complaint: chest pain    History of Present Illness:   History obtained from the patient and wife. The patient is a 74 y.o. male who presents with complaints of a 1 day history of chest pain patient described as sharp stabbing pain that radiated to his back for the last day. Patient is accompanied by his wife who is providing the history as he has Alzheimer's dementia. She denies any previous reports of chest pain. She denies any nausea, vomiting, fevers or chills. No reports of cough. Patient had presented to [facility] and CT imaging of the chest showed a mediastinal mass encapsulating the SVC. Patient was transferred over for cardiothoracic surgery eval.      Past Medical History:        Diagnosis Date    Arthritis     Asthma     Blood circulation, collateral     bilateral LE    CAD (coronary artery disease)     COPD (chronic obstructive pulmonary disease) (HCC)     Diabetes mellitus ([address]     in the past    GERD (gastroesophageal reflux disease)     Hyperlipidemia     Hypertension     Other disorders of kidney and ureter in diseases classified elsewhere     Psychiatric problem        Past Surgical History:        Procedure Laterality Date    CARDIAC CATHETERIZATION      CAROTID ENDARTERECTOMY Left 12/26/2018    LEFT CAROTID ENDARTERECTOMY performed by Branden Davidson MD at [facility]      right eye for detached retina    TONSILLECTOMY      TOTAL HIP ARTHROPLASTY Right 7/21/2022    RIGHT TOTAL HIP REPLACEMENT performed by Hilda Yao MD at [address] [place] [address] Medications:   No current facility-administered medications on file prior to encounter.      Current Outpatient Medications on File Prior to Encounter   Medication Sig Dispense Refill    carvedilol (COREG) 12.5 MG tablet Take 6.25 mg by mouth 2 times daily (with meals) Take am of sx      donepezil (ARICEPT) 10 MG tablet Take 5 mg by mouth nightly      OMEGA 3-6-9 FATTY ACIDS PO Take 2 capsules by mouth 2 times daily Hold 5 days before sx      traZODone (DESYREL) 100 MG tablet Take 100 mg by mouth at bedtime      clopidogrel (PLAVIX) 75 MG tablet Take 75 mg by mouth daily Hold 5 days before sx      cilostazol (PLETAL) 100 MG tablet Take 100 mg by mouth 2 times daily Hold 5 days before sx      acetaminophen (TYLENOL) 325 MG tablet Take 2 tablets by mouth every 4 hours as needed for Pain 120 tablet 3    atorvastatin (LIPITOR) 80 MG tablet Take 1 tablet by mouth daily 30 tablet 0    pantoprazole (PROTONIX) 40 MG tablet Take 1 tablet by mouth every morning (before breakfast) 30 tablet 11    albuterol sulfate  (90 Base) MCG/ACT inhaler Inhale 2 puffs into the lungs every 4 hours as needed for Wheezing or Shortness of Breath May take am of sx (Patient not taking: Reported on 12/17/2022)      hydrochlorothiazide (HYDRODIURIL) 25 MG tablet Take 25 mg by mouth daily Hold am of sx      nitroGLYCERIN (NITROSTAT) 0.4 MG SL tablet Place 1 tablet under the tongue every 5 minutes as needed for Chest pain 25 tablet 3    amLODIPine (NORVASC) 10 MG tablet Take 1 tablet by mouth daily 30 tablet 3    aspirin 81 MG EC tablet Take 81 mg by mouth daily Hold 5 days before sx         Allergies:  Patient has no known allergies. Social History:    reports that he quit smoking about 7 years ago. He has a 15.00 pack-year smoking history. He has never used smokeless tobacco. He reports that he does not drink alcohol and does not use drugs.     Family History:       Problem Relation Age of Onset    Cancer Mother         bladder    Diabetes Mother     Asthma Father     Diabetes Sister     Heart Disease Brother         MI @ 52    Heart Disease Sister         MI in 45s       Review of systems:  Constitutional: no fever, no night sweats, no fatigue  Head: no headache, no head injury, no migranes. Eye: no blurring of vision, no double vision. Ears: no hearing difficulty, no tinnitus  Mouth/throat: no ulceration, dental caries, dysphagia  Lungs: no cough, no shortness of breath, no wheeze  CVS: chest pain  GI: no abdominal pain, no nausea , no vomiting, no constipation  DIEGO: no dysuria, frequency and urgency, no hematuria, no kidney stones  Musculoskeletal: no joint pain, swelling , stiffness  Endocrine: no polyuria, polydypsia, no cold or heat intolerence  Hematology: no anemia, no easy brusing or bleeding, no hx of clotting disorder  Dermatology: no skin rash, no eczema, no prurities,  Psychiatry: no depression, no anxiety,no panic attacks, no suicide ideation  Neurology: no syncope, no seizures, no numbness or tingling of hands, no numbness or tingling of feet, no paresis    10 point review of systems completed, all other than noted above are negative. Vitals:   Vitals:    12/17/22 1315   BP: 127/66   Pulse: 78   Resp: 18   Temp: 97.9 °F (36.6 °C)   SpO2:       BMI: Body mass index is 28.7 kg/m². Exam:  Physical Examination: General appearance - alert, awake appears to be in no acute distress  HEENT: Atraumatic normocephalic,no JVD, trachea is midline  Neck - supple, no significant adenopathy, no JVD, or carotid bruits  Chest - Bilateral air entry, no wheezes, crackles or rhonchi. Non tender to palpation of chest wall  Heart - S1S2 RRR, no murmurs or gallops  Abdomen - Soft, non tender non distended. Normoactive bowel sounds  Neurological - II-XII grossly intact, no neurological deficits  Musculoskeletal - 5/5 power upper and lower extremities equal bilaterally.   Full ROM of all limbs  Extremities - no edema, no cyanosis or clubbing  Skin - normal coloration and turgor, no rashes, no suspicious skin lesions noted      Review of Labs and Diagnostic Testing:  CBC:   Recent Labs     12/17/22  0802   WBC 24.4*   HGB 10.8*   PLT 276     BMP:    Recent Labs     12/17/22  0802      K 4.1      CO2 23   BUN 19   CREATININE 0.9   GLUCOSE 134*     Calcium:  Recent Labs     12/17/22  0802   CALCIUM 9.5       INR:   Recent Labs     12/17/22  0802   INR 1.06         Radiology:     CT INTERPRETATION OF OUTSIDE IMAGES    Result Date: 12/17/2022  CT angiography chest using contrast. 3-D postprocessing Comparison: None Findings: There is no evidence of pulmonary embolus. The thoracic aorta is ectatic and contains calcified and noncalcified atherosclerotic plaque. There is aneurysmal dilatation of the descending thoracic aorta which has a maximum transverse dimension of 3.8 cm. There are several ulcerated plaques within the distal descending thoracic aorta just above the level of the aortic hiatus. Heart size within normal limits. RV/LV ratio normal. There is a large heterogeneously enhancing mediastinal mass which measures approximately 5.7 x 5.9 x 5.5 cm. This mass encases and possibly invades portions of the superior vena cava which is markedly narrowed but remains patent to the level of the right atrium. This lesion also encases and severely narrows the distal right pulmonary artery. There is associated right hilar and subcarinal adenopathy. There are linear areas of subsegmental atelectasis versus scarring bilaterally. Small tree-in-bud opacities are seen within the right upper lobe and may represent small airways disease. However, there is also a slightly irregular 7 mm right upper lobe pulmonary nodule (image 31 series 2). A focus of pulmonary metastatic disease is not excluded. No focal consolidation or pleural effusion is present. There is a small endobronchial filling defect at the origin of the right mainstem bronchus measuring 7 mm in size. This abuts the mediastinal mass and invasion of this mass into the bronchus is not excluded. There are no acute fractures. Impression: 1. No evidence of pulmonary embolus.  2. Descending thoracic aortic aneurysm with ulcerated plaque within the distal thoracic aorta. 3. Large mediastinal mass which encases the superior vena cava and the distal right pulmonary artery, consistent with malignancy. 4. Right upper lobe pulmonary nodule which may represent a focus of metastatic disease 5. Small endobronchial filling defect at the origin of the right mainstem bronchus as discussed above. ___________________________________ CT angiogram of the abdomen and pelvis with contrast Comparison: None Findings: There is ectasia and atherosclerotic calcification throughout the abdominal aorta. There is aneurysmal dilatation of the infrarenal abdominal aorta which has a transverse dimension of 3.6 cm. There is also diffuse aneurysmal dilatation of the right common iliac artery which has a transverse dimension of 1.8 cm. The pelvic vasculature is patent with multifocal areas of moderate stenosis within the bilateral common iliac arteries and right external iliac artery. Atherosclerotic plaque results in moderate to severe narrowing at the origin of the right renal artery and moderate narrowing at the origin of the left renal artery. 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 There is a focal area of mild to moderate stenosis near the origin of the celiac artery. The inferior mesenteric artery is clearly visualized and may be occluded. There is a too small to characterize low-density lesion within the anterior segment of the right hepatic lobe. The liver is otherwise homogeneous. Multiple gallstones are present. The pancreas, spleen and visualized portions of the adrenal glands are normal in appearance. There are small well-circumscribed low-density lesions in both kidneys which are likely to represent cysts.  In addition, there is a poorly defined heterogeneous lesion arising from the upper pole of the right kidney which measures approximately 1.9 x 2.2 x 1.4 cm in size. This latter finding is concerning for a neoplasm. There is no bowel distention or displacement. Distal colonic diverticulosis is present. A moderate to large stool burden is present. There is limited assessment of the pelvis due to streak artifact from bilateral total hip prostheses. The visualized portions of the urinary bladder are unremarkable. There are no acute bony abnormalities. No soft tissue abnormalities are present. Impression: 1. Infrarenal abdominal aortic aneurysm with a transverse dimension of 3.6 cm. Extensive atherosclerotic disease with multifocal areas of vessel stenosis as outlined above. 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 2. Possible right renal neoplasm. The lesion in the upper pole of the right kidney could be further assessed with MRI as clinically warranted. 3. Cholelithiasis. 4. Moderate to large burden. Colonic diverticulosis. This document has been electronically signed by: Jamshid Cruz MD on 12/17/2022 06:14 AM All CTs at this facility use dose modulation techniques and iterative reconstructions, and/or weight-based dosing when appropriate to reduce radiation to a low as reasonably achievable. 3D Post-processing was performed on this study. Principal Problem:    Mediastinal mass  Resolved Problems:    * No resolved hospital problems. *      Assessment and Plan:    Mediastinal mass encapsulating SVC: CT chest reviewed from outside facility. Patient is currently chest pain-free. cardiothoracic surgery evaluated patient reports mass is nonobstructing of the SVC. Pulmonology consulted for EBUS and tissue diagnosis so patient can proceed with either chemo +/- radiation. Diabetes type 2: As needed sliding scale  COPD: Not in acute exacerbation and on room air at baseline.   Coronary artery disease: History of PCI, currently on carvedilol, Plavix, atorvastatin. We will hold Plavix for possible biopsy  Hypertension: BP well controlled, continue with carvedilol. Alzheimer's dementia: Continue with donepezil.   Advance care planning: Full code      DVT prophylaxis: [] Lovenox                                 [] SCDs                                 [] SQ Heparin                                 [x] Encourage ambulation, low risk for DVT, no chemical or mechanical prophylaxis necessary              [] Already on Anticoagulation                Anticipated Disposition upon discharge: [x] Home                                                                         [] Home with Home Health                                                                         [] [facility]                                                                         [] [address] [place] [address],[address]          Electronically signed by Reinaldo Finnegan MD on 12/17/2022 at 2:27 PM

## 2022-12-17 NOTE — ED NOTES
Pt. Is stable will be transported to [facility]. Spoke with charge nurse mai Goyal Apgar  [phone]

## 2022-12-17 NOTE — ED TRIAGE NOTES
Pt presents to the ED from [facility] with c/o chest pain from a mass on his lung. Pt has a cough and has no pain as of right now. Pt is supposed to be admitted to the hospital, there just wasn't a bed ready for him. VSS.

## 2022-12-17 NOTE — CONSULTS
[address]  [facility]  Dept: David: [phone]    Visit Date: 12/17/2022    Mr. West Messina is a 74 y.o.male  who presented for:  Chief Complaint   Patient presents with    Chest Pain    Other     Lung mass (tumor)       HPI:   HPI 75-year-old man with multitude of medical problems. Has dementia and lives at home with his wife. Has a home health aide that visits discontinued since a hip replacement earlier this year. Now is complaining of right sided chest pain. This started 2 days ago. CAT scan at an outside hospital shows a large mediastinal mass encircling his superior vena cava extending down to below the mynor. Now admitted and transferred to [facility] for further care. Current Facility-Administered Medications:     sodium chloride flush 0.9 % injection 5-40 mL, 5-40 mL, IntraVENous, 2 times per day, Princess Aldridge MD    sodium chloride flush 0.9 % injection 5-40 mL, 5-40 mL, IntraVENous, PRN, Princess Aldridge MD    0.9 % sodium chloride infusion, , IntraVENous, PRN, Princess Aldridge MD    ondansetron (ZOFRAN-ODT) disintegrating tablet 4 mg, 4 mg, Oral, Q8H PRN **OR** ondansetron (ZOFRAN) injection 4 mg, 4 mg, IntraVENous, Q6H PRN, Princess Aldridge MD    polyethylene glycol (GLYCOLAX) packet 17 g, 17 g, Oral, Daily PRN, Princess Aldridge MD    acetaminophen (TYLENOL) tablet 650 mg, 650 mg, Oral, Q6H PRN **OR** acetaminophen (TYLENOL) suppository 650 mg, 650 mg, Rectal, Q6H PRN, Princess Aldridge MD    No Known Allergies    Past [facility]  has a past medical history of Arthritis, Asthma, Blood circulation, collateral, CAD (coronary artery disease), COPD (chronic obstructive pulmonary disease) ([address] Diabetes mellitus ([address] GERD (gastroesophageal reflux disease), Hyperlipidemia, Hypertension, Other disorders of kidney and ureter in diseases classified elsewhere, and Psychiatric problem.     Social History  Ora Ott  reports that he quit smoking about 7 years ago. He has a 15.00 pack-year smoking history. He has never used smokeless tobacco. He reports that he does not drink alcohol and does not use drugs. Family History  Stephen family history includes Asthma in his father; Cancer in his mother; Diabetes in his mother and sister; Heart Disease in his brother and sister. There is no family history of bicuspid aortic valve, aneurysms, heart transplant, pacemakers, defibrillators, or sudden cardiac death.       Past Surgical History   Past Surgical History:   Procedure Laterality Date    CARDIAC CATHETERIZATION      CAROTID ENDARTERECTOMY Left 12/26/2018    LEFT CAROTID ENDARTERECTOMY performed by Ainsley Bryant MD at [facility]      right eye for detached retina    TONSILLECTOMY      TOTAL HIP ARTHROPLASTY Right 7/21/2022    RIGHT TOTAL HIP REPLACEMENT performed by Andrew Estrella MD at [address] [place] [address] Place:     Review of Systems    Objective:     /66   Pulse 78   Temp 97.9 °F (36.6 °C) (Temporal)   Resp 18   Ht 5' 10\" (1.778 m)   Wt 200 lb (90.7 kg)   SpO2 95%   BMI 28.70 kg/m²     Wt Readings from Last 3 Encounters:   12/17/22 200 lb (90.7 kg)   07/21/22 202 lb (91.6 kg)   04/06/22 204 lb 12.8 oz (92.9 kg)     BP Readings from Last 3 Encounters:   12/17/22 127/66   07/22/22 110/70   04/07/22 (!) 155/70       Physical Exam    Lab Results   Component Value Date/Time    WBC 24.4 12/17/2022 08:02 AM    RBC 3.87 12/17/2022 08:02 AM    HGB 10.8 12/17/2022 08:02 AM    HCT 34.6 12/17/2022 08:02 AM    MCV 89.4 12/17/2022 08:02 AM    MCH 27.9 12/17/2022 08:02 AM    MCHC 31.2 12/17/2022 08:02 AM    RDW 12.6 05/25/2016 05:24 AM     12/17/2022 08:02 AM    MPV 11.5 12/17/2022 08:02 AM       Lab Results   Component Value Date/Time     12/17/2022 08:02 AM    K 4.1 12/17/2022 08:02 AM    K 4.5 07/21/2022 05:02 PM     12/17/2022 08:02 AM    CO2 23 12/17/2022 08:02 AM    BUN 19 12/17/2022 08:02 AM    LABALBU 3.6 04/06/2022 07:00 AM    CREATININE 0.9 12/17/2022 08:02 AM    CALCIUM 9.5 12/17/2022 08:02 AM    LABGLOM >60 12/17/2022 08:02 AM    GLUCOSE 134 12/17/2022 08:02 AM       Lab Results   Component Value Date/Time    ALKPHOS 115 04/06/2022 07:00 AM    ALT 12 04/06/2022 07:00 AM    AST 17 04/06/2022 07:00 AM    PROT 6.0 04/06/2022 07:00 AM    BILITOT 0.4 04/06/2022 07:00 AM    BILIDIR <0.2 05/25/2016 05:24 AM    LABALBU 3.6 04/06/2022 07:00 AM       Lab Results   Component Value Date/Time    MG 1.9 04/06/2022 07:00 AM       Lab Results   Component Value Date    INR 1.06 12/17/2022    INR 0.90 12/26/2018    INR 0.95 12/19/2018         Lab Results   Component Value Date/Time    LABA1C 6.4 07/22/2022 07:02 AM       Lab Results   Component Value Date/Time    TRIG 321 11/06/2018 05:00 AM    HDL 31 11/06/2018 05:00 AM    LDLCALC 74 11/06/2018 05:00 AM       Lab Results   Component Value Date/Time    TSH 0.587 04/06/2022 07:00 AM         Testing Reviewed:      I have individually reviewed the images of the following tests:    CT chest: as described    Assessment/Plan     1.  Mediastinal mass      Orders Placed This Encounter   Procedures    CT INTERPRETATION OF OUTSIDE IMAGES    Basic Metabolic Panel    CBC with Auto Differential    Protime-INR    Anion Gap    Glomerular Filtration Rate, Estimated    Osmolality    Scan of Blood Smear    Diet NPO    Vital signs per unit routine    Up as tolerated    Telemetry monitoring - 72 hour duration    Full Code    Inpatient consult to Cardiothoracic Surgery    Initiate Oxygen Therapy Protocol    ADMIT TO INPATIENT     Orders Placed This Encounter   Medications    sodium chloride flush 0.9 % injection 5-40 mL    sodium chloride flush 0.9 % injection 5-40 mL    0.9 % sodium chloride infusion    OR Linked Order Group     ondansetron (ZOFRAN-ODT) disintegrating tablet 4 mg     ondansetron (ZOFRAN) injection 4 mg    polyethylene glycol [facility]) packet 17 g    OR Linked Order Group     acetaminophen (TYLENOL) tablet 650 mg     acetaminophen (TYLENOL) suppository 650 mg     Impression. Large tumor in his mediastinum, likely a malignancy. I had a long discussion with him and his wife and his niece in regard to the situation. I believe that the next part of his treatment will focus on providing a diagnosis. I am optimistic that this can be done via EBUS. I recommend that pulmonary consult be obtained for endobronchial ultrasound and biopsy of this mass. Further treatment can be directed based on the diagnosis. I suspect he will need radiation therapy plus or minus chemotherapy as well. I have discussed this with the patient's physician Dr. Jill Fuchs. The tumor is not obstructing his superior vena cava and there is no need for thoracic surgery. There is no indication for stent. I am signing off this case. 62 minutes spent. No follow-ups on file.       Electronically signed by Laxmi Aguirre MD   17/07/4777 at 2:36 PM EST

## 2022-12-17 NOTE — ED NOTES
Pt vitals collected. Pt denies any needs at this time. Pt respirations easy and unlabored.      Rhoda Woodard RN  12/17/22 8360

## 2022-12-17 NOTE — ED NOTES
ED to inpatient nurses report    Chief Complaint   Patient presents with    Chest Pain    Other     Lung mass (tumor)      Present to ED from home  LOC: alert and orientated to name, place, date  Vital signs   Vitals:    12/17/22 0344   BP: 137/78   Pulse: 82   Resp: 18   Temp: 98.4 °F (36.9 °C)   TempSrc: Oral   SpO2: 97%   Weight: 200 lb (90.7 kg)   Height: 5' 10\" (1.778 m)      Oxygen Baseline room air    Current needs required room air Bipap/Cpap No  LDAs:   Peripheral IV 12/17/22 Right Antecubital (Active)   Site Assessment Clean, dry & intact 12/17/22 0352   Line Status Flushed 12/17/22 0352   Phlebitis Assessment No symptoms 12/17/22 0352   Infiltration Assessment 0 12/17/22 0352   Dressing Status Clean, dry & intact 12/17/22 0352   Dressing Type Transparent 12/17/22 0352     Mobility: Requires assistance * 1  Pending ED orders: none  Present condition: stable      Electronically signed by Rachel Dickerson RN on 12/17/2022 at [address] [place] [address]  12/17/22 5695

## 2022-12-17 NOTE — ED PROVIDER NOTES
For which  [address] MEDICINE ATTENDING ATTESTATION      Evaluation of Sanaz Tom. Case discussed and care plan developed with resident physician. I agree with the resident physician documentation and plan as documented by him, except if my documentation differs. Patient seen, interviewed and examined by me. I reviewed the medical, surgical, family and social history, medications and allergies. I have reviewed the nursing documentation. I have reviewed the patient's vital signs and are normal per my interpretation. Body mass index is 28.7 kg/m². Pulsoxymetry is normal per my interpretation. Brief H&P   Patient c/o mid chest pain that has been present for several weeks the patient decided to get checked today. [facility] as part of the work-up a CT scan was done, finding large mediastinal mass compressing and infiltrating the superior vena cava. Transfer was requested to our facility and was accepted by her cardiothoracic surgeon for evaluation in the emergency department. Patient currently offers no complaints. Physical exam is notable for well appearing, nonfocal exam, no facial edema, not plethoric. Medical Decision Making   MDM:   Midsternal mass with compression and infiltration visit. Vena cava  Plan:   Labs obtained at previous facility, available on EMR via [address] available as well  We will consult cardiothoracic surgery for admission versus transfer  Observation in the ED while awaiting recommendations    Please see the resident physician completed note for final disposition except as documented on this attestation. I have reviewed and interpreted all available lab, radiology and ekg results available at the moment. Diagnosis, treatment and disposition plans were discussed and agreed upon by patient. This transcription was electronically signed.  It was dictated by use of voice recognition software and electronically transcribed. The transcription may contain errors not detected in proofreading.      I performed direct supervision and was present for the critical portion following procedures: None  Critical care time on this case: None    Electronically signed by Catrina Lobo MD on 12/17/22 at 4:29 AM EST        Catrina Lobo MD  12/17/22 0694

## 2022-12-17 NOTE — ED NOTES
In for hourly rounding. Pt resting on cot in position of comfort. Pt remains A&Ox4, resps easy and unlabored. IV shows no s/s of infection or infiltration. Pt pain remains unchanged at this time. Monitor remains in place. Updated pt on POC. Will monitor.      Haydee Cornell RN  12/17/22 3246

## 2022-12-18 LAB
ANION GAP SERPL CALCULATED.3IONS-SCNC: 14 MEQ/L (ref 8–16)
BASOPHILS # BLD: 0.4 %
BASOPHILS ABSOLUTE: 0.1 THOU/MM3 (ref 0–0.1)
BUN BLDV-MCNC: 20 MG/DL (ref 7–22)
CALCIUM SERPL-MCNC: 9.9 MG/DL (ref 8.5–10.5)
CHLORIDE BLD-SCNC: 103 MEQ/L (ref 98–111)
CO2: 21 MEQ/L (ref 23–33)
CREAT SERPL-MCNC: 0.9 MG/DL (ref 0.4–1.2)
EOSINOPHIL # BLD: 2.3 %
EOSINOPHILS ABSOLUTE: 0.5 THOU/MM3 (ref 0–0.4)
ERYTHROCYTE [DISTWIDTH] IN BLOOD BY AUTOMATED COUNT: 13.8 % (ref 11.5–14.5)
ERYTHROCYTE [DISTWIDTH] IN BLOOD BY AUTOMATED COUNT: 44.5 FL (ref 35–45)
GFR SERPL CREATININE-BSD FRML MDRD: > 60 ML/MIN/1.73M2
GLUCOSE BLD-MCNC: 109 MG/DL (ref 70–108)
GLUCOSE BLD-MCNC: 133 MG/DL (ref 70–108)
GLUCOSE BLD-MCNC: 136 MG/DL (ref 70–108)
GLUCOSE BLD-MCNC: 178 MG/DL (ref 70–108)
GLUCOSE BLD-MCNC: 90 MG/DL (ref 70–108)
HCT VFR BLD CALC: 35 % (ref 42–52)
HEMOGLOBIN: 10.9 GM/DL (ref 14–18)
IMMATURE GRANS (ABS): 0.38 THOU/MM3 (ref 0–0.07)
IMMATURE GRANULOCYTES: 1.6 %
LYMPHOCYTES # BLD: 10 %
LYMPHOCYTES ABSOLUTE: 2.3 THOU/MM3 (ref 1–4.8)
MCH RBC QN AUTO: 27.8 PG (ref 26–33)
MCHC RBC AUTO-ENTMCNC: 31.1 GM/DL (ref 32.2–35.5)
MCV RBC AUTO: 89.3 FL (ref 80–94)
MONOCYTES # BLD: 5.2 %
MONOCYTES ABSOLUTE: 1.2 THOU/MM3 (ref 0.4–1.3)
NUCLEATED RED BLOOD CELLS: 0 /100 WBC
PLATELET # BLD: 265 THOU/MM3 (ref 130–400)
PMV BLD AUTO: 11.4 FL (ref 9.4–12.4)
POTASSIUM SERPL-SCNC: 4.6 MEQ/L (ref 3.5–5.2)
RBC # BLD: 3.92 MILL/MM3 (ref 4.7–6.1)
REASON FOR REJECTION: NORMAL
REJECTED TEST: NORMAL
SEG NEUTROPHILS: 80.5 %
SEGMENTED NEUTROPHILS ABSOLUTE COUNT: 18.8 THOU/MM3 (ref 1.8–7.7)
SODIUM BLD-SCNC: 138 MEQ/L (ref 135–145)
WBC # BLD: 23.3 THOU/MM3 (ref 4.8–10.8)

## 2022-12-18 PROCEDURE — 2580000003 HC RX 258: Performed by: HOSPITALIST

## 2022-12-18 PROCEDURE — 1200000003 HC TELEMETRY R&B

## 2022-12-18 PROCEDURE — 6370000000 HC RX 637 (ALT 250 FOR IP): Performed by: HOSPITALIST

## 2022-12-18 PROCEDURE — 36415 COLL VENOUS BLD VENIPUNCTURE: CPT

## 2022-12-18 PROCEDURE — 85025 COMPLETE CBC W/AUTO DIFF WBC: CPT

## 2022-12-18 PROCEDURE — 82948 REAGENT STRIP/BLOOD GLUCOSE: CPT

## 2022-12-18 PROCEDURE — 99232 SBSQ HOSP IP/OBS MODERATE 35: CPT | Performed by: HOSPITALIST

## 2022-12-18 PROCEDURE — 80048 BASIC METABOLIC PNL TOTAL CA: CPT

## 2022-12-18 RX ORDER — ACETAMINOPHEN 325 MG/1
650 TABLET ORAL EVERY 4 HOURS PRN
Status: DISCONTINUED | OUTPATIENT
Start: 2022-12-18 | End: 2022-12-19 | Stop reason: HOSPADM

## 2022-12-18 RX ORDER — AMLODIPINE BESYLATE 10 MG/1
10 TABLET ORAL DAILY
Status: DISCONTINUED | OUTPATIENT
Start: 2022-12-18 | End: 2022-12-19 | Stop reason: HOSPADM

## 2022-12-18 RX ORDER — DONEPEZIL HYDROCHLORIDE 5 MG/1
5 TABLET, FILM COATED ORAL NIGHTLY
Status: DISCONTINUED | OUTPATIENT
Start: 2022-12-18 | End: 2022-12-19 | Stop reason: HOSPADM

## 2022-12-18 RX ORDER — PANTOPRAZOLE SODIUM 40 MG/1
40 TABLET, DELAYED RELEASE ORAL
Status: DISCONTINUED | OUTPATIENT
Start: 2022-12-19 | End: 2022-12-19 | Stop reason: HOSPADM

## 2022-12-18 RX ORDER — ATORVASTATIN CALCIUM 80 MG/1
80 TABLET, FILM COATED ORAL DAILY
Status: DISCONTINUED | OUTPATIENT
Start: 2022-12-18 | End: 2022-12-19 | Stop reason: HOSPADM

## 2022-12-18 RX ORDER — CARVEDILOL 6.25 MG/1
6.25 TABLET ORAL 2 TIMES DAILY WITH MEALS
Status: DISCONTINUED | OUTPATIENT
Start: 2022-12-18 | End: 2022-12-19 | Stop reason: HOSPADM

## 2022-12-18 RX ORDER — TRAZODONE HYDROCHLORIDE 100 MG/1
100 TABLET ORAL NIGHTLY
Status: DISCONTINUED | OUTPATIENT
Start: 2022-12-18 | End: 2022-12-19 | Stop reason: HOSPADM

## 2022-12-18 RX ADMIN — SODIUM CHLORIDE, PRESERVATIVE FREE 10 ML: 5 INJECTION INTRAVENOUS at 10:07

## 2022-12-18 RX ADMIN — SODIUM CHLORIDE, PRESERVATIVE FREE 10 ML: 5 INJECTION INTRAVENOUS at 21:59

## 2022-12-18 RX ADMIN — ATORVASTATIN CALCIUM 80 MG: 80 TABLET, FILM COATED ORAL at 15:39

## 2022-12-18 RX ADMIN — CARVEDILOL 6.25 MG: 6.25 TABLET, FILM COATED ORAL at 15:39

## 2022-12-18 RX ADMIN — TRAZODONE HYDROCHLORIDE 100 MG: 100 TABLET ORAL at 21:59

## 2022-12-18 RX ADMIN — AMLODIPINE BESYLATE 10 MG: 10 TABLET ORAL at 15:39

## 2022-12-18 RX ADMIN — DONEPEZIL HYDROCHLORIDE 5 MG: 5 TABLET, FILM COATED ORAL at 21:59

## 2022-12-18 ASSESSMENT — PAIN SCALES - GENERAL
PAINLEVEL_OUTOF10: 0
PAINLEVEL_OUTOF10: 0

## 2022-12-18 NOTE — PLAN OF CARE
Problem: Chronic Conditions and Co-morbidities  Goal: Patient's chronic conditions and co-morbidity symptoms are monitored and maintained or improved  Outcome: Progressing  Flowsheets  Taken 12/17/2022 2145 by Jessica Thompson 34 - Patient's Chronic Conditions and Co-Morbidity Symptoms are Monitored and Maintained or Improved:   Monitor and assess patient's chronic conditions and comorbid symptoms for stability, deterioration, or improvement   Collaborate with multidisciplinary team to address chronic and comorbid conditions and prevent exacerbation or deterioration   Update acute care plan with appropriate goals if chronic or comorbid symptoms are exacerbated and prevent overall improvement and discharge  Taken 12/17/2022 1326 by Jessica Tee 34 - Patient's Chronic Conditions and Co-Morbidity Symptoms are Monitored and Maintained or Improved: Monitor and assess patient's chronic conditions and comorbid symptoms for stability, deterioration, or improvement     Problem: Discharge Planning  Goal: Discharge to home or other facility with appropriate resources  12/18/2022 0209 by Rosa Crandall RN  Outcome: Progressing  Flowsheets (Taken 12/17/2022 2145)  Discharge to home or other facility with appropriate resources:   Identify barriers to discharge with patient and caregiver   Arrange for needed discharge resources and transportation as appropriate   Identify discharge learning needs (meds, wound care, etc)   Refer to discharge planning if patient needs post-hospital services based on physician order or complex needs related to functional status, cognitive ability or social support system  12/17/2022 1725 by Mariana Collado RN  Flowsheets  Taken 12/17/2022 1609  Discharge to home or other facility with appropriate resources:   Identify barriers to discharge with patient and caregiver   Identify discharge learning needs (meds, wound care, etc)  Taken 12/17/2022 1326  Discharge to home or other facility with appropriate resources: Identify barriers to discharge with patient and caregiver     Problem: Pain  Goal: Verbalizes/displays adequate comfort level or baseline comfort level  12/18/2022 0209 by Aissatou Stafford RN  Outcome: Progressing  Flowsheets (Taken 12/17/2022 2145)  Verbalizes/displays adequate comfort level or baseline comfort level:   Encourage patient to monitor pain and request assistance   Assess pain using appropriate pain scale   Administer analgesics based on type and severity of pain and evaluate response   Implement non-pharmacological measures as appropriate and evaluate response   Consider cultural and social influences on pain and pain management   Notify Licensed Independent Practitioner if interventions unsuccessful or patient reports new pain  12/17/2022 1725 by Priscilla Ornelas RN  Flowsheets (Taken 12/17/2022 1725)  Verbalizes/displays adequate comfort level or baseline comfort level:   Encourage patient to monitor pain and request assistance   Assess pain using appropriate pain scale     Problem: Safety - Adult  Goal: Free from fall injury  12/18/2022 0209 by Aissatou Stafford RN  Outcome: Progressing  Flowsheets (Taken 12/17/2022 1725 by Priscilla Ornelas RN)  Free From Fall Injury:   Ena Flores family/caregiver on patient safety   Based on caregiver fall risk screen, instruct family/caregiver to ask for assistance with transferring infant if caregiver noted to have fall risk factors  12/17/2022 1725 by Priscilla Ornelas RN  Flowsheets (Taken 12/17/2022 1725)  Free From Fall Injury:   Instruct family/caregiver on patient safety   Based on caregiver fall risk screen, instruct family/caregiver to ask for assistance with transferring infant if caregiver noted to have fall risk factors     Problem: Confusion  Goal: Confusion, delirium, dementia, or psychosis is improved or at baseline  Description: INTERVENTIONS:  1.  Assess for possible contributors to thought disturbance, including medications, impaired vision or hearing, underlying metabolic abnormalities, dehydration, psychiatric diagnoses, and notify attending LIP  2. [place] high risk fall precautions, as indicated  3. Provide frequent short contacts to provide reality reorientation, refocusing and direction  4. Decrease environmental stimuli, including noise as appropriate  5. Monitor and intervene to maintain adequate nutrition, hydration, elimination, sleep and activity  6. If unable to ensure safety without constant attention obtain sitter and review sitter guidelines with assigned personnel  7.  Initiate Psychosocial CNS and Spiritual Care consult, as indicated  12/18/2022 0209 by Stan Hassan RN  Outcome: Progressing  Flowsheets (Taken 12/17/2022 2145)  Effect of thought disturbance (confusion, delirium, dementia, or psychosis) are managed with adequate functional status:   Assess for contributors to thought disturbance, including medications, impaired vision or hearing, underlying metabolic abnormalities, dehydration, psychiatric diagnoses, notify [facility] high risk fall precautions, as indicated   Provide frequent short contacts to provide reality reorientation, refocusing and direction   Decrease environmental stimuli, including noise as appropriate   Monitor and intervene to maintain adequate nutrition, hydration, elimination, sleep and activity  12/17/2022 1725 by Ciro Rodas RN  Flowsheets (Taken 12/17/2022 1725)  Effect of thought disturbance (confusion, delirium, dementia, or psychosis) are managed with adequate functional status:   Assess for contributors to thought disturbance, including medications, impaired vision or hearing, underlying metabolic abnormalities, dehydration, psychiatric diagnoses, notify [facility] high risk fall precautions, as indicated   Provide frequent short contacts to provide reality reorientation, refocusing and direction   Decrease environmental stimuli, including noise as appropriate   Monitor and intervene to maintain adequate nutrition, hydration, elimination, sleep and activity       Care plan reviewed with patient. Patient verbalized understanding of the plan of care and contributed to goal setting.

## 2022-12-18 NOTE — PROGRESS NOTES
Hospitalist Progress Note    Patient:  Niko Alcazar      Unit/Bed:4B-09/009-A    YOB: 1950    MRN: [id number]       Acct: [de-identified]     PCP: Thang Vu MD    Date of Admission: 12/17/2022    Assessment/Plan:    Mediastinal mass encapsulating SVC: CT chest reviewed from outside facility. Patient is currently chest pain-free. cardiothoracic surgery evaluated patient reports mass is nonobstructing of the SVC. Pulmonology evaluated and will need EBUS and tissue diagnosis so patient can proceed with either chemo +/- radiation. Plavix on hold  Diabetes type 2: As needed sliding scale  COPD: Not in acute exacerbation and on room air at baseline. Coronary artery disease: History of PCI, currently on carvedilol, Plavix, atorvastatin. We will hold Plavix for possible biopsy  Hypertension: BP well controlled, continue with carvedilol. Alzheimer's dementia: Continue with donepezil. Advance care planning: Full code  DVT prophylaxis: SCD      Subjective (past 24 hours):   Patient seen and examined at bedside, states he is doing well today, no further chest pain, cough or SOB. No acute overnight events       Medications:  Reviewed    Infusion Medications    sodium chloride      dextrose       Scheduled Medications    sodium chloride flush  5-40 mL IntraVENous 2 times per day    insulin lispro  0-4 Units SubCUTAneous TID WC    insulin lispro  0-4 Units SubCUTAneous Nightly     PRN Meds: sodium chloride flush, sodium chloride, ondansetron **OR** ondansetron, polyethylene glycol, acetaminophen **OR** acetaminophen, glucose, dextrose bolus **OR** dextrose bolus, glucagon (rDNA), dextrose      Intake/Output Summary (Last 24 hours) at 12/18/2022 1332  Last data filed at 12/18/2022 0945  Gross per 24 hour   Intake 390 ml   Output --   Net 390 ml       Diet:  ADULT DIET;  Regular; 5 carb choices (75 gm/meal)    Exam:  /70   Pulse 73   Temp 98.3 °F (36.8 °C) (Oral)   Resp 16   Ht 5' 10\" (1.778 m)   Wt 200 lb (90.7 kg)   SpO2 96%   BMI 28.70 kg/m²     General appearance: No apparent distress, appears stated age and cooperative. Respiratory:  Normal respiratory effort. Clear to auscultation, bilaterally without Rales/Wheezes/Rhonchi. Cardiovascular: Regular rate and rhythm with normal S1/S2 without murmurs, rubs or gallops. Abdomen: Soft, non-tender, non-distended with normal bowel sounds. Extremities: no pedal edema      Labs:   Recent Labs     12/17/22  0802 12/18/22  0607   WBC 24.4* 23.3*   HGB 10.8* 10.9*   HCT 34.6* 35.0*    265     Recent Labs     12/17/22  0802 12/18/22  0438    138   K 4.1 4.6    103   CO2 23 21*   BUN 19 20   CREATININE 0.9 0.9   CALCIUM 9.5 9.9     No results for input(s): AST, ALT, BILIDIR, BILITOT, ALKPHOS in the last 72 hours. Recent Labs     12/17/22  0802   INR 1.06     No results for input(s): Kerry Nasuti in the last 72 hours. No results for input(s): PROCAL in the last 72 hours. Microbiology:      Urinalysis:      Lab Results   Component Value Date/Time    NITRU NEGATIVE 06/16/2022 11:26 AM    WBCUA > 100 06/16/2022 11:26 AM    BACTERIA MANY 06/16/2022 11:26 AM    RBCUA 3-5 06/16/2022 11:26 AM    BLOODU NEGATIVE 06/16/2022 11:26 AM    SPECGRAV 1.015 11/06/2018 05:15 AM    GLUCOSEU NEGATIVE 06/16/2022 11:26 AM       Radiology:  CT INTERPRETATION OF OUTSIDE IMAGES    Result Date: 12/17/2022  CT angiography chest using contrast. 3-D postprocessing Comparison: None Findings: There is no evidence of pulmonary embolus. The thoracic aorta is ectatic and contains calcified and noncalcified atherosclerotic plaque. There is aneurysmal dilatation of the descending thoracic aorta which has a maximum transverse dimension of 3.8 cm. There are several ulcerated plaques within the distal descending thoracic aorta just above the level of the aortic hiatus. Heart size within normal limits.  RV/LV ratio normal. There is a large heterogeneously enhancing mediastinal mass which measures approximately 5.7 x 5.9 x 5.5 cm. This mass encases and possibly invades portions of the superior vena cava which is markedly narrowed but remains patent to the level of the right atrium. This lesion also encases and severely narrows the distal right pulmonary artery. There is associated right hilar and subcarinal adenopathy. There are linear areas of subsegmental atelectasis versus scarring bilaterally. Small tree-in-bud opacities are seen within the right upper lobe and may represent small airways disease. However, there is also a slightly irregular 7 mm right upper lobe pulmonary nodule (image 31 series 2). A focus of pulmonary metastatic disease is not excluded. No focal consolidation or pleural effusion is present. There is a small endobronchial filling defect at the origin of the right mainstem bronchus measuring 7 mm in size. This abuts the mediastinal mass and invasion of this mass into the bronchus is not excluded. There are no acute fractures. Impression: 1. No evidence of pulmonary embolus. 2. Descending thoracic aortic aneurysm with ulcerated plaque within the distal thoracic aorta. 3. Large mediastinal mass which encases the superior vena cava and the distal right pulmonary artery, consistent with malignancy. 4. Right upper lobe pulmonary nodule which may represent a focus of metastatic disease 5. Small endobronchial filling defect at the origin of the right mainstem bronchus as discussed above. ___________________________________ CT angiogram of the abdomen and pelvis with contrast Comparison: None Findings: There is ectasia and atherosclerotic calcification throughout the abdominal aorta. There is aneurysmal dilatation of the infrarenal abdominal aorta which has a transverse dimension of 3.6 cm. There is also diffuse aneurysmal dilatation of the right common iliac artery which has a transverse dimension of 1.8 cm.  The pelvic vasculature is patent with multifocal areas of moderate stenosis within the bilateral common iliac arteries and right external iliac artery. Atherosclerotic plaque results in moderate to severe narrowing at the origin of the right renal artery and moderate narrowing at the origin of the left renal artery. 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 There is a focal area of mild to moderate stenosis near the origin of the celiac artery. The inferior mesenteric artery is clearly visualized and may be occluded. There is a too small to characterize low-density lesion within the anterior segment of the right hepatic lobe. The liver is otherwise homogeneous. Multiple gallstones are present. The pancreas, spleen and visualized portions of the adrenal glands are normal in appearance. There are small well-circumscribed low-density lesions in both kidneys which are likely to represent cysts. In addition, there is a poorly defined heterogeneous lesion arising from the upper pole of the right kidney which measures approximately 1.9 x 2.2 x 1.4 cm in size. This latter finding is concerning for a neoplasm. There is no bowel distention or displacement. Distal colonic diverticulosis is present. A moderate to large stool burden is present. There is limited assessment of the pelvis due to streak artifact from bilateral total hip prostheses. The visualized portions of the urinary bladder are unremarkable. There are no acute bony abnormalities. No soft tissue abnormalities are present. Impression: 1. Infrarenal abdominal aortic aneurysm with a transverse dimension of 3.6 cm. Extensive atherosclerotic disease with multifocal areas of vessel stenosis as outlined above. 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 2. Possible right renal neoplasm.  The lesion in the upper pole of the right kidney could be further assessed with MRI as clinically warranted. 3. Cholelithiasis. 4. Moderate to large burden. Colonic diverticulosis. This document has been electronically signed by: Ab Carcamo MD on 12/17/2022 06:14 AM All CTs at this facility use dose modulation techniques and iterative reconstructions, and/or weight-based dosing when appropriate to reduce radiation to a low as reasonably achievable. 3D Post-processing was performed on this study.       DVT prophylaxis: [] Lovenox                                 [x] SCDs                                 [] SQ Heparin                                 [] Encourage ambulation           [] Already on Anticoagulation     Code Status: Full Code    Tele:   [x] yes             [] no    Active Hospital Problems    Diagnosis Date Noted    Mediastinal mass [J98.59] 12/17/2022     Priority: Medium       Electronically signed by Brian Smith MD on 12/18/2022 at 1:32 PM

## 2022-12-18 NOTE — PLAN OF CARE
Problem: Chronic Conditions and Co-morbidities  Goal: Patient's chronic conditions and co-morbidity symptoms are monitored and maintained or improved  12/18/2022 1245 by Eder Trejo RN  Outcome: Progressing  Flowsheets (Taken 12/18/2022 1005)  Care Plan - Patient's Chronic Conditions and Co-Morbidity Symptoms are Monitored and Maintained or Improved: Monitor and assess patient's chronic conditions and comorbid symptoms for stability, deterioration, or improvement  12/18/2022 0209 by Ra Llanes RN  Outcome: Progressing  Flowsheets  Taken 12/17/2022 2145 by Jessica Escalera 34 - Patient's Chronic Conditions and Co-Morbidity Symptoms are Monitored and Maintained or Improved:   Monitor and assess patient's chronic conditions and comorbid symptoms for stability, deterioration, or improvement   Collaborate with multidisciplinary team to address chronic and comorbid conditions and prevent exacerbation or deterioration   Update acute care plan with appropriate goals if chronic or comorbid symptoms are exacerbated and prevent overall improvement and discharge  Taken 12/17/2022 1326 by Jessica Tuttle 34 - Patient's Chronic Conditions and Co-Morbidity Symptoms are Monitored and Maintained or Improved: Monitor and assess patient's chronic conditions and comorbid symptoms for stability, deterioration, or improvement     Problem: Discharge Planning  Goal: Discharge to home or other facility with appropriate resources  12/18/2022 1245 by Eder Trejo RN  Outcome: Progressing  Flowsheets (Taken 12/18/2022 1005)  Discharge to home or other facility with appropriate resources: Identify barriers to discharge with patient and caregiver  12/18/2022 0209 by Ra Llanes RN  Outcome: Progressing  Flowsheets (Taken 12/17/2022 2145)  Discharge to home or other facility with appropriate resources:   Identify barriers to discharge with patient and caregiver   Arrange for needed discharge resources and transportation as appropriate   Identify discharge learning needs (meds, wound care, etc)   Refer to discharge planning if patient needs post-hospital services based on physician order or complex needs related to functional status, cognitive ability or social support system     Problem: Pain  Goal: Verbalizes/displays adequate comfort level or baseline comfort level  12/18/2022 1245 by Ciro Rodas RN  Outcome: Progressing  12/18/2022 0209 by Stan Hassan RN  Outcome: Progressing  Flowsheets (Taken 12/17/2022 2145)  Verbalizes/displays adequate comfort level or baseline comfort level:   Encourage patient to monitor pain and request assistance   Assess pain using appropriate pain scale   Administer analgesics based on type and severity of pain and evaluate response   Implement non-pharmacological measures as appropriate and evaluate response   Consider cultural and social influences on pain and pain management   Notify Licensed Independent Practitioner if interventions unsuccessful or patient reports new pain     Problem: Safety - Adult  Goal: Free from fall injury  12/18/2022 1245 by Ciro Rodas RN  Outcome: Progressing  12/18/2022 0209 by Stan Hassan RN  Outcome: Progressing  Flowsheets (Taken 12/17/2022 1725 by Ciro Rodas RN)  Free From Fall Injury:   Instruct family/caregiver on patient safety   Based on caregiver fall risk screen, instruct family/caregiver to ask for assistance with transferring infant if caregiver noted to have fall risk factors     Problem: Confusion  Goal: Confusion, delirium, dementia, or psychosis is improved or at baseline  Description: INTERVENTIONS:  1. Assess for possible contributors to thought disturbance, including medications, impaired vision or hearing, underlying metabolic abnormalities, dehydration, psychiatric diagnoses, and notify attending LIP  2. [place] high risk fall precautions, as indicated  3.  Provide frequent short contacts to provide reality reorientation, refocusing and direction  4. Decrease environmental stimuli, including noise as appropriate  5. Monitor and intervene to maintain adequate nutrition, hydration, elimination, sleep and activity  6. If unable to ensure safety without constant attention obtain sitter and review sitter guidelines with assigned personnel  7.  Initiate Psychosocial CNS and Spiritual Care consult, as indicated  12/18/2022 1245 by Jennifer Gómez RN  Outcome: Progressing  12/18/2022 0209 by Geraldine Winston RN  Outcome: Progressing  Flowsheets (Taken 12/17/2022 2145)  Effect of thought disturbance (confusion, delirium, dementia, or psychosis) are managed with adequate functional status:   Assess for contributors to thought disturbance, including medications, impaired vision or hearing, underlying metabolic abnormalities, dehydration, psychiatric diagnoses, notify [facility] high risk fall precautions, as indicated   Provide frequent short contacts to provide reality reorientation, refocusing and direction   Decrease environmental stimuli, including noise as appropriate   Monitor and intervene to maintain adequate nutrition, hydration, elimination, sleep and activity

## 2022-12-18 NOTE — CONSULTS
[facility]    Patient - Sanaz Tom   MRN -  [id number]   Sixto Roy # - [de-identified]   - 1950      Date of Admission -  2022  3:40 AM  Date of evaluation -  2022  Room - 56 [facility] Day - 1  Breonna Ang MD Primary Care Physician - Meghan Cuadra MD   Chief Complaint   Large mediastinal mass encasing superior vena cava   Active Hospital Problem List      Active Hospital Problems    Diagnosis Date Noted    Mediastinal mass [J98.59] 2022     Priority: Medium     HPI   Sanaz Tom is a 74 y.o. male with past medical history of HTN, DM2, CAD, PVD, and Dementia presented due to chest pain for two days, described as sharp, stabbing, epigastric area, radiating to the back. He had a CTA chest that showed large mediastinal mass encasing the superior vena cava. No evidence of facial swelling or Stridor  Past Medical History         Diagnosis Date    Arthritis     Asthma     Blood circulation, collateral     bilateral LE    CAD (coronary artery disease)     COPD (chronic obstructive pulmonary disease) (HCC)     Diabetes mellitus ([address]     in the past    GERD (gastroesophageal reflux disease)     Hyperlipidemia     Hypertension     Other disorders of kidney and ureter in diseases classified elsewhere     Psychiatric problem       Past Surgical History           Procedure Laterality Date    CARDIAC CATHETERIZATION      CAROTID ENDARTERECTOMY Left 2018    LEFT CAROTID ENDARTERECTOMY performed by Jayashree Carrillo MD at [facility]      right eye for detached retina    TONSILLECTOMY      TOTAL HIP ARTHROPLASTY Right 2022    RIGHT TOTAL HIP REPLACEMENT performed by Penelope Hartmann MD at [address]; Regular; 5 carb choices (75 gm/meal)  Allergies    Patient has no known allergies.   Social History     Social History     Socioeconomic History    Marital status:  Spouse name: Oliver Zarate    Number of children: 5    Years of education: Not on file    Highest education level: Not on file   Occupational History    Not on file   Tobacco Use    Smoking status: Former     Packs/day: 0.75     Years: 20.00     Pack years: 15.00     Types: Cigarettes     Quit date: 2015     Years since quittin.8    Smokeless tobacco: Never   Vaping Use    Vaping Use: Never used   Substance and Sexual Activity    Alcohol use: No    Drug use: No    Sexual activity: Not on file   Other Topics Concern    Not on file   Social History Narrative    Not on file     Social Determinants of Health     Financial Resource Strain: Not on file   Food Insecurity: Not on file   Transportation Needs: Not on file   Physical Activity: Not on file   Stress: Not on file   Social Connections: Not on file   Intimate Partner Violence: Not on file   Housing Stability: Not on file     Family History          Problem Relation Age of Onset    Cancer Mother         bladder    Diabetes Mother     Asthma Father     Diabetes Sister     Heart Disease Brother         MI @ 52    Heart Disease Sister         MI in 45s     Sleep History    No history  ROS    General/Constitutional: Weight loss, no fever or hemoptysis  HENT: Negative. Eyes: Negative. Upper respiratory tract: No nasal stuffiness or post nasal drip. Lower respiratory tract/ lungs: Cough and SOB  Cardiovascular: No palpitations, chest pain or edema. Gastrointestinal: No nausea or vomiting. Neurological: No focal neurological weakness. Extremities: No tenderness. Musculoskeletal: no complaints  Genitourinary: No complaints. Hematological: Negative. Denies easy buising  Skin: No itching.   Meds    Current Medications    sodium chloride flush  5-40 mL IntraVENous 2 times per day    insulin lispro  0-4 Units SubCUTAneous TID WC    insulin lispro  0-4 Units SubCUTAneous Nightly     sodium chloride flush, sodium chloride, ondansetron **OR** ondansetron, polyethylene glycol, acetaminophen **OR** acetaminophen, glucose, dextrose bolus **OR** dextrose bolus, glucagon (rDNA), dextrose  IV Drips/Infusions   sodium chloride      dextrose       Vitals    Vitals    height is 5' 10\" (1.778 m) and weight is 200 lb (90.7 kg). His oral temperature is 97.9 °F (36.6 °C). His blood pressure is 142/67 (abnormal) and his pulse is 74. His respiration is 18 and oxygen saturation is 98%. I/O    Intake/Output Summary (Last 24 hours) at 12/18/2022 0800  Last data filed at 12/17/2022 2145  Gross per 24 hour   Intake 150 ml   Output --   Net 150 ml     Patient Vitals for the past 96 hrs (Last 3 readings):   Weight   12/17/22 0344 200 lb (90.7 kg)     Exam   Constitutional: Patient appears moderately built and moderately nourished. Head: Normocephalic and atraumatic. Mouth/Throat: Oropharynx is clear and moist.  No oral thrush. Eyes: Conjunctivae are normal. Pupils are equal, round, and reactive to light. No scleral icterus. Neck: Neck supple. No JVD or tracheal deviation present. Cardiovascular: Regular rate, regular rhythm, S1 and S2 with no murmur. No peripheral edema  Pulmonary/Chest: Normal effort with clear breath sounds. No stridor. No respiratory distress. Abdominal: Soft. Bowel sounds audible. No distension or tenderness to palp  Musculoskeletal: Moves all extremities  Lymphadenopathy:  No cervical adenopathy. Neurological: Patient is alert and oriented to person, place, and time. Skin: Skin is warm and dry.       Labs   ABG  No results found for: PH, PO2, PCO2, HCO3, O2SAT  No results found for: IFIO2, MODE, SETTIDVOL, SETPEEP  CBC  Recent Labs     12/17/22  0802 12/18/22  0607   WBC 24.4* 23.3*   RBC 3.87* 3.92*   HGB 10.8* 10.9*   HCT 34.6* 35.0*   MCV 89.4 89.3   MCH 27.9 27.8   MCHC 31.2* 31.1*    265   MPV 11.5 11.4      BMP  Recent Labs     12/17/22  0802 12/18/22  0438    138   K 4.1 4.6    103   CO2 23 21*   BUN 19 20   CREATININE 0.9 0.9 GLUCOSE 134* 90   CALCIUM 9.5 9.9     LFT  No results for input(s): AST, ALT, ALB, BILITOT, ALKPHOS, LIPASE in the last 72 hours. Invalid input(s): AMYLASE  TROP  Lab Results   Component Value Date/Time    TROPONINT < 0.010 04/06/2022 10:39 AM    TROPONINT < 0.010 04/06/2022 07:00 AM    TROPONINT < 0.010 12/20/2018 03:40 AM     BNP  Lab Results   Component Value Date/Time    PROBNP 147.2 04/06/2022 07:00 AM    PROBNP 114.2 12/19/2018 06:15 PM     D-Dimer  Lab Results   Component Value Date/Time    DDIMER 1492.00 11/05/2018 06:28 PM     Lactic Acid  No results for input(s): LACTA in the last 72 hours. INR  Recent Labs     12/17/22  0802   INR 1.06     PTT  No results for input(s): APTT in the last 72 hours. Glucose  Recent Labs     12/17/22  1606 12/17/22 2011   POCGLU 108 134*     UA No results for input(s): Evins Brilliant, COLORU, CLARITYU, MUCUS, PROTEINU, BLOODU, RBCUA, WBCUA, BACTERIA, NITRU, GLUCOSEU, BILIRUBINUR, UROBILINOGEN, KETUA, LABCAST, LABCASTTY, AMORPHOS in the last 72 hours. Invalid input(s): CRYSTALS. PFTs   None  Echo     Summary   Ejection fraction is visually estimated at 60%. Overall left ventricular function is normal.   Aortic valve appears tricuspid. Aortic valve leaflets are somewhat thickened. Aortic valve leaflets are Mildly calcified. Cultures    Procalcitonin  No results found for: [facility]     Radiology    CXR    CT Scans    (See actual reports for details)    Assessment   Large mediastinal mass encasing the SVC, no evidence of SVC syndrome or thrombosis at this stage   COPD  CAD s/p PCI on plavix   DM2  Alzheimer dementia  Recommendations     Will plan EBUS with FNA for tissue biopsy, definitive treatment depend on histologic subtype, may need SVC stenting if resistant histology   Continue to hold Plavix   Oncology consult     Thank you for the consult and allowing us to participate in the care of your patient.      Case discussed with nurse and patient/family. Questions and concerns addressed. Meds and Orders reviewed.     Electronically signed by     Amy Celis MD on 12/18/2022 at 8:00 AM

## 2022-12-19 VITALS
OXYGEN SATURATION: 99 % | TEMPERATURE: 98.2 F | DIASTOLIC BLOOD PRESSURE: 78 MMHG | RESPIRATION RATE: 18 BRPM | WEIGHT: 200 LBS | HEIGHT: 70 IN | HEART RATE: 67 BPM | SYSTOLIC BLOOD PRESSURE: 122 MMHG | BODY MASS INDEX: 28.63 KG/M2

## 2022-12-19 DIAGNOSIS — J98.59 MEDIASTINAL MASS: ICD-10-CM

## 2022-12-19 DIAGNOSIS — I65.22 LEFT CAROTID STENOSIS: Primary | ICD-10-CM

## 2022-12-19 LAB
% INHIBITION AA: ABNORMAL %
% INHIBITION ADP: ABNORMAL %
AA % AGGREGATION: ABNORMAL %
AA AGONIST MAX AMPLITUDE: 68.4 MM (ref 51–71)
ABO: NORMAL
ACCELERATED MAX AMPLITUDE: 72.6 MM (ref 52–70)
ADP AGONIST MAX AMPLITUDE: 41.7 MM (ref 45–69)
ADP PERCENT AGGREGATION: ABNORMAL %
ANION GAP SERPL CALCULATED.3IONS-SCNC: 9 MEQ/L (ref 8–16)
ANTIBODY SCREEN: NORMAL
BASOPHILS # BLD: 0.3 %
BASOPHILS ABSOLUTE: 0.1 THOU/MM3 (ref 0–0.1)
BUN BLDV-MCNC: 14 MG/DL (ref 7–22)
CALCIUM SERPL-MCNC: 9.4 MG/DL (ref 8.5–10.5)
CHLORIDE BLD-SCNC: 103 MEQ/L (ref 98–111)
CITRATED KAOLIN ANGLE: 61.5 DEG (ref 63–78)
CITRATED KAOLIN K TIME: 0.7 MINUTES (ref 0.8–2.1)
CITRATED KAOLIN MAX AMPLITUDE: 71.6 MM (ref 52–69)
CITRATED KAOLIN R TIME: 4.3 MINUTES (ref 4.6–9.1)
CO2: 24 MEQ/L (ref 23–33)
CREAT SERPL-MCNC: 0.7 MG/DL (ref 0.4–1.2)
EOSINOPHIL # BLD: 2.6 %
EOSINOPHILS ABSOLUTE: 0.5 THOU/MM3 (ref 0–0.4)
ERYTHROCYTE [DISTWIDTH] IN BLOOD BY AUTOMATED COUNT: 13.8 % (ref 11.5–14.5)
ERYTHROCYTE [DISTWIDTH] IN BLOOD BY AUTOMATED COUNT: 44.3 FL (ref 35–45)
FIBRINOGEN MAX AMPLITUDE: 50.1 MM (ref 15–32)
FUNCT FIBRINOGEN LEVEL: 914.2 MG/DL (ref 278–581)
GFR SERPL CREATININE-BSD FRML MDRD: > 60 ML/MIN/1.73M2
GLUCOSE BLD-MCNC: 109 MG/DL (ref 70–108)
GLUCOSE BLD-MCNC: 115 MG/DL (ref 70–108)
GLUCOSE BLD-MCNC: 118 MG/DL (ref 70–108)
HCT VFR BLD CALC: 34.7 % (ref 42–52)
HEMOGLOBIN: 10.9 GM/DL (ref 14–18)
HEPARINASE R TIME: 4.7 MINUTES (ref 4.3–8.3)
IMMATURE GRANS (ABS): 0.16 THOU/MM3 (ref 0–0.07)
IMMATURE GRANULOCYTES: 0.8 %
INHIBITED PLATELET MAX AMPLITUDE: > 30 MM (ref 2–19)
LYMPHOCYTES # BLD: 10.1 %
LYMPHOCYTES ABSOLUTE: 2.1 THOU/MM3 (ref 1–4.8)
MAXIMUM AMPLITUDE: > 71.6 MM (ref 53–68)
MCH RBC QN AUTO: 27.8 PG (ref 26–33)
MCHC RBC AUTO-ENTMCNC: 31.4 GM/DL (ref 32.2–35.5)
MCV RBC AUTO: 88.5 FL (ref 80–94)
MONOCYTES # BLD: 5.2 %
MONOCYTES ABSOLUTE: 1.1 THOU/MM3 (ref 0.4–1.3)
NUCLEATED RED BLOOD CELLS: 0 /100 WBC
PLATELET # BLD: 266 THOU/MM3 (ref 130–400)
PMV BLD AUTO: 11.5 FL (ref 9.4–12.4)
POTASSIUM SERPL-SCNC: 4 MEQ/L (ref 3.5–5.2)
RBC # BLD: 3.92 MILL/MM3 (ref 4.7–6.1)
RH FACTOR: NORMAL
SEG NEUTROPHILS: 81 %
SEGMENTED NEUTROPHILS ABSOLUTE COUNT: 17.1 THOU/MM3 (ref 1.8–7.7)
SODIUM BLD-SCNC: 136 MEQ/L (ref 135–145)
WBC # BLD: 21.1 THOU/MM3 (ref 4.8–10.8)

## 2022-12-19 PROCEDURE — 86900 BLOOD TYPING SEROLOGIC ABO: CPT

## 2022-12-19 PROCEDURE — 85025 COMPLETE CBC W/AUTO DIFF WBC: CPT

## 2022-12-19 PROCEDURE — 85347 COAGULATION TIME ACTIVATED: CPT

## 2022-12-19 PROCEDURE — 86850 RBC ANTIBODY SCREEN: CPT

## 2022-12-19 PROCEDURE — 36415 COLL VENOUS BLD VENIPUNCTURE: CPT

## 2022-12-19 PROCEDURE — 2580000003 HC RX 258: Performed by: HOSPITALIST

## 2022-12-19 PROCEDURE — 6370000000 HC RX 637 (ALT 250 FOR IP): Performed by: HOSPITALIST

## 2022-12-19 PROCEDURE — 80048 BASIC METABOLIC PNL TOTAL CA: CPT

## 2022-12-19 PROCEDURE — 99239 HOSP IP/OBS DSCHRG MGMT >30: CPT | Performed by: HOSPITALIST

## 2022-12-19 PROCEDURE — 82948 REAGENT STRIP/BLOOD GLUCOSE: CPT

## 2022-12-19 PROCEDURE — 85576 BLOOD PLATELET AGGREGATION: CPT

## 2022-12-19 PROCEDURE — 85384 FIBRINOGEN ACTIVITY: CPT

## 2022-12-19 PROCEDURE — 86901 BLOOD TYPING SEROLOGIC RH(D): CPT

## 2022-12-19 RX ADMIN — CARVEDILOL 6.25 MG: 6.25 TABLET, FILM COATED ORAL at 07:52

## 2022-12-19 RX ADMIN — PANTOPRAZOLE SODIUM 40 MG: 40 TABLET, DELAYED RELEASE ORAL at 05:24

## 2022-12-19 RX ADMIN — CARVEDILOL 6.25 MG: 6.25 TABLET, FILM COATED ORAL at 16:09

## 2022-12-19 RX ADMIN — SODIUM CHLORIDE, PRESERVATIVE FREE 10 ML: 5 INJECTION INTRAVENOUS at 07:53

## 2022-12-19 RX ADMIN — ATORVASTATIN CALCIUM 80 MG: 80 TABLET, FILM COATED ORAL at 07:52

## 2022-12-19 RX ADMIN — AMLODIPINE BESYLATE 10 MG: 10 TABLET ORAL at 07:52

## 2022-12-19 ASSESSMENT — ENCOUNTER SYMPTOMS
CHEST TIGHTNESS: 0
NAUSEA: 0
COLOR CHANGE: 0
EYE REDNESS: 0
BACK PAIN: 0
SINUS PRESSURE: 0
SHORTNESS OF BREATH: 0
VOMITING: 0

## 2022-12-19 ASSESSMENT — PAIN SCALES - GENERAL: PAINLEVEL_OUTOF10: 0

## 2022-12-19 NOTE — CARE COORDINATION
DISCHARGE PLANNING EVALUATION  12/19/22, 2:41 PM EST    Reason for Referral: [facility] services from [address] Status: Patient is alert and oriented  Decision Making: Patient makes own decisions  Family/Social/Home Environment: Spoke with patient assessment completed. Patient lives with wife in a two story home, remain on main floor. He has walk in shower with a bench. He states he is independent with ADL's. Wife does household chores. He drives, but has not recently due to health issues. Current Services including food security, transportation and housekeeping: Current with [facility], nursing  Current Equipment:cane  Payment Source:VA and Medicare  Concerns or Barriers to Discharge: none  Post-acute [facility]) provider list was provided to patient. Patient was informed of their freedom to choose [facility] provider. Discussed and offered to show the patient the relevant [facility] Providers quality and resource use measures on Medicare Compare web site via computer based on patient's goals of care and treatment preferences. Questions regarding selection process were answered. Teach Back Method used with patient regarding care plan and discharge planning. Patient  verbalize understanding of the plan of care and contribute to goal setting. Patient goals, treatment preferences and discharge plan: Plan home wit wife and resume [facility]. Spoke with Lukas at [facility], [facility] nursing services. Informed patient discharging today. Electronically signed by TRAY Mena on 12/19/2022 at 2:41 PM    12/19/22, 2:53 PM EST    Patient goals/plan/ treatment preferences discussed by  and . Patient goals/plan/ treatment preferences reviewed with patient/ family. Patient/ family verbalize understanding of discharge plan and are in agreement with goal/plan/treatment preferences. Understanding was demonstrated using the teach back method.   AVS provided by RN at time of discharge, which includes all necessary medical information pertaining to the patients current course of illness, treatment, post-discharge goals of care, and treatment preferences. Services At/After Discharge: Home Health and Nursing service [facility]        IMM Letter  IMM Letter given to Patient/Family/Significant other/Guardian/POA/by[de-identified] Pt Access.   IMM Letter date given[de-identified] 12/17/22  IMM Letter time given[de-identified] [id number]

## 2022-12-19 NOTE — CONSULTS
Patient:  Shabana Soares    Unit/Bed:8B-25/025-A  MRN: [id number]   PCP: Carlos Garcia MD  Date of Admission: 12/17/2022    Assessment and Plan(All pulmonary edema, renal failure, PE, and respiratory failure diagnoses are acute in nature unless otherwise specified):        Mediastinal mass: Large and encasing the superior vena cava and distal right pulmonary artery, concerning for malignancy, per CT chest report 12/17/2022. Also with \"a small endobronchial filling defect at the origin of the right mainstem bronchus measuring 7 mm in size. This abuts the mediastinal mass and invasion of this mass into the bronchus is not excluded. \"  S/p CT surgery consult. Mass is not obstructing the SVC, and there is no need for surgery from their view. They recommended pulm consuslt for EBUS and biopsy. Pulm following. Dr. Maritza Hurley team was asked to assist with performing EBUS with FNA. However, patient and nursing informed me at bedside that patient and family prefer for discharge today, with outpatient Onc and Pulm follow-up before scheduling an EBUS/FNA as outpatient. I am told there are already plans for discharge today by primary team. Dr. Holder Hence team will sign off at this time, but let us know if plans change. Holding plavix since admission. TEG was ordered today. Plans changed as above. Right upper lobe pulmonary 7 mm nodule: Per CT chest 12/17/22. May represent metastatic disease. Plan as above. Descending thoracic aortic 3.8 cm aneurysm: CT surgery consulted. COPD: only uses PRN albuterol at home. No prior PFT in EMR to review. Pulm following. Alzheimer's dementia: On Aricept. Other chronic medical problems: CAD, s/p PCI, DM type II, HTN, GERD, HLD. Primary team managing.        CC:  Chest pain  HPI: Dalton Patton is a 73 y/o male former-smoker with PMH of CAD, carotid artery disease status post left carotid endarterectomy in 2018, COPD/asthma, diabetes mellitus type 2, GERD, hyperlipidemia, hypertension, arthritis, Alzheimer's dementia. Patient presented to [facility] ED on 12/17/2022 for complaints of chest pain, of 1 day duration. Per report, it was described as sharp and stabbing, with radiation to his back. Patient had a CT done of his chest at outlying facility, reporting a mediastinal mass encapsulating the superior vena cava. Patient was transferred to [facility] for cardiothoracic surgery evaluation. He was admitted to the hospitalist service. Cardiothoracic surgery evaluated the patient, and did not see any surgical indication with patient's case. They recommended an EBUS with biopsy for further evaluation of the mediastinal mass for diagnosis. Pulmonology was consulted. Dr. Richard Ellis team was asked to assist with performing EBUS with FNA. For further details, please see assessment and plan. ROS: Review of Systems   Constitutional:  Negative for chills and fever. HENT:  Negative for congestion and sinus pressure. Eyes:  Negative for redness and visual disturbance. Respiratory:  Negative for chest tightness and shortness of breath. Cardiovascular:  Negative for chest pain and leg swelling. Gastrointestinal:  Negative for nausea and vomiting. Genitourinary:  Negative for difficulty urinating and dysuria. Musculoskeletal:  Negative for back pain and neck pain. Skin:  Negative for color change and pallor. Neurological:  Negative for dizziness and light-headedness. Psychiatric/Behavioral:  Negative for agitation. The patient is not nervous/anxious. PMH:  Per HPI  SHX:  Former-smoker. 15 pack-year history. Quit in 2015. Denies alcohol or substance use. FHX: Mother- bladder cancer, diabetes. Father- asthma. Sister- diabetes. Sister (2)- heart disease. Brother- heart disease. Allergies: NKDA.    Medications:     sodium chloride      dextrose        amLODIPine  10 mg Oral Daily    atorvastatin  80 mg Oral Daily    carvedilol  6.25 mg Oral BID WC    donepezil  5 mg Oral Nightly    traZODone  100 mg Oral Nightly    pantoprazole  40 mg Oral QAM AC    sodium chloride flush  5-40 mL IntraVENous 2 times per day    insulin lispro  0-4 Units SubCUTAneous TID WC    insulin lispro  0-4 Units SubCUTAneous Nightly       Vital Signs:   T: 98.1: P: 63 RR: 18 B/P: 131/60: FiO2: RA: O2 Sat:95%: I/O: Net +390 mL last 24h GCS: 15  Body mass index is 28.7 kg/m². [facility]:   Acute on chronically ill adult male. Appears stated age. HEENT:  normocephalic and atraumatic. No scleral icterus. PERR. Neck: supple. No Thyromegaly. Lungs: clear to auscultation. Unlabored respirations. No retractions. Cardiac: RRR. No JVD. Abdomen: soft. Nontender. Extremities:  No clubbing, cyanosis, or edema x 4. Vasculature: capillary refill < 3 seconds. Palpable dorsalis pedis pulses. Skin:  warm and dry. Psych:  Alert and oriented x3. Affect appropriate  Lymph:  No supraclavicular adenopathy. Neurologic:  No focal deficit. No seizures. Data: (All radiographs, tracings, PFTs, and imaging are personally viewed and interpreted unless otherwise noted). Telemetry: NSR. Na 136 K 4.0 Cl 103 CO2 24 BUN 14 Cr 0.7 Gluc 109 Ca 9.4   WBC 21.1 Hgb 110.9 Hct 34.7 Plt 266   TEG: ordered and pending. CT chest (interpretation of outside images) 12/17/22 report: No evidence of pulmonary embolus. Descending thoracic aortic aneurysm with ulcerated plaque within the distal thoracic aorta. Large mediastinal mass which encases the superior vena cava and the distal right pulmonary artery, consistent with malignancy. Right upper lobe pulmonary nodule which may represent a focus of metastatic disease. There is a small endobronchial filling defect at the origin of the right mainstem bronchus measuring 7 mm in size. This abuts the mediastinal mass and invasion of this mass into the bronchus is not excluded. Case discussed with Dr. Amanda Greco.     Electronically signed by Stanley Monroe Contreras, APRN-CNP                                     Patient seen by me including key components of medical care. Case discussed with NP. Case discussed with Dr. Rowan Heath. Awaiting Plavix clearance before EBUS. Italicized font, if present,  represents changes to the note made by me. CC time 25 minutes. Time was discontiguous. Time does not include procedure. Time does include my direct assessment of the patient and coordination of care. Time represents more than 50% of the time involved with patient care by the [address] [place] [address] team.  Electronically signed by Luis Henderson.  Starla Fox MD.

## 2022-12-19 NOTE — CARE COORDINATION
12/19/22, 1:47 PM EST      DISCHARGE PLANNING EVALUATION    Carol Real  Admitted: 12/17/2022  Hospital Day: 2    Location: -25/025-A Reason for admit: Mediastinal mass [J98.59]    Past Medical History:   Diagnosis Date    Arthritis     Asthma     Blood circulation, collateral     bilateral LE    CAD (coronary artery disease)     COPD (chronic obstructive pulmonary disease) (HCC)     Diabetes mellitus ([address]     in the past    GERD (gastroesophageal reflux disease)     Hyperlipidemia     Hypertension     Other disorders of kidney and ureter in diseases classified elsewhere     Psychiatric problem        Procedure:   CT Chest:   1. No evidence of pulmonary embolus. 2. Descending thoracic aortic aneurysm with ulcerated plaque within the    distal thoracic aorta. 3. Large mediastinal mass which encases the superior vena cava and the    distal right pulmonary artery, consistent with malignancy. 4. Right upper lobe pulmonary nodule which may represent a focus of    metastatic disease   5. Small endobronchial filling defect at the origin of the right mainstem    bronchus as discussed above. CT Abd/Plv:   1. Infrarenal abdominal aortic aneurysm with a transverse dimension of 3.6    cm. Extensive atherosclerotic disease with multifocal areas of vessel stenosis    as outlined above. 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 2. Possible right renal neoplasm. The lesion in the upper pole of the    right kidney could be further assessed with MRI as clinically warranted. 3. Cholelithiasis. 4. Moderate to large burden. Colonic diverticulosis. Barriers to Discharge: Direct admit from [facility] with concern of tumor impeding SVC. Cardiothoracic surgery evaluated and states that there is no obstruction of superior vena cave. Planning for outpatient EBUS.      PCP: Marquise Lebron, MD    Readmission Risk              Risk of Unplanned Readmission:  12         Patient's Healthcare Decision Maker: Legal Next of Kin    Patient Goals/Plan/Treatment Preferences: From home with his wife. Has HH through the [address] [facility] consult entered. Transportation/Food Security/Housekeeping Addressed: No issues identified.

## 2022-12-19 NOTE — PROGRESS NOTES
[facility]    Patient - Shabana Soares   MRN -  [id number]   [facility] # - [de-identified]   - 1950      Date of Admission -  2022  3:40 AM  Date of evaluation -  2022  Room - 8B--A   Hospital Day - 2  Bud Mejia MD Primary Care Physician - Carlos Garcia MD   Reason for consult   Large mediastinal Mass   Active Hospital Problem List      Active Hospital Problems    Diagnosis Date Noted    Mediastinal mass [J98.59] 2022     Priority: Medium     HPI   Shabana Soares is a 74 y.o. male with past medical history of HTN, DM2, CAD, PVD, and Dementia presented due to chest pain for two days, described as sharp, stabbing, epigastric area, radiating to the back. He had a CTA chest that showed large mediastinal mass encasing the superior vena cava.  No evidence of facial swelling or Stridor      Past 24 hrs   -on RA   -Noted confusion repeats same phrases multiple times very pleasant reorients for short time period  -reports Chest pain has improved since admission   All other systems reviewed    Past Medical History         Diagnosis Date    Arthritis     Asthma     Blood circulation, collateral     bilateral LE    CAD (coronary artery disease)     COPD (chronic obstructive pulmonary disease) ([facility])     Diabetes mellitus ([facility] [address]     in the past    GERD (gastroesophageal reflux disease)     Hyperlipidemia     Hypertension     Other disorders of kidney and ureter in diseases classified elsewhere     Psychiatric problem       Past Surgical History           Procedure Laterality Date    CARDIAC CATHETERIZATION      CAROTID ENDARTERECTOMY Left 2018    LEFT CAROTID ENDARTERECTOMY performed by Pratik Aranda MD at [facility]      right eye for detached retina    TONSILLECTOMY      TOTAL HIP ARTHROPLASTY Right 2022    RIGHT TOTAL HIP REPLACEMENT performed by Jag Mahmood MD at [facility]  Allergies    Patient has no known allergies.   Social History     Social History     Socioeconomic History    Marital status:      Spouse name: Preet Welch    Number of children: 5    Years of education: Not on file    Highest education level: Not on file   Occupational History    Not on file   Tobacco Use    Smoking status: Former     Packs/day: 0.75     Years: 20.00     Pack years: 15.00     Types: Cigarettes     Quit date: 2015     Years since quittin.8    Smokeless tobacco: Never   Vaping Use    Vaping Use: Never used   Substance and Sexual Activity    Alcohol use: No    Drug use: No    Sexual activity: Not on file   Other Topics Concern    Not on file   Social History Narrative    Not on file     Social Determinants of Health     Financial Resource Strain: Not on file   Food Insecurity: Not on file   Transportation Needs: Not on file   Physical Activity: Not on file   Stress: Not on file   Social Connections: Not on file   Intimate Partner Violence: Not on file   Housing Stability: Not on file     Family History          Problem Relation Age of Onset    Cancer Mother         bladder    Diabetes Mother     Asthma Father     Diabetes Sister     Heart Disease Brother         MI @ 52    Heart Disease Sister         MI in 45s     Sleep History    No history  Meds    Current Medications    amLODIPine  10 mg Oral Daily    atorvastatin  80 mg Oral Daily    carvedilol  6.25 mg Oral BID WC    donepezil  5 mg Oral Nightly    traZODone  100 mg Oral Nightly    pantoprazole  40 mg Oral QAM AC    sodium chloride flush  5-40 mL IntraVENous 2 times per day    insulin lispro  0-4 Units SubCUTAneous TID WC    insulin lispro  0-4 Units SubCUTAneous Nightly     acetaminophen, sodium chloride flush, sodium chloride, ondansetron **OR** ondansetron, polyethylene glycol, [DISCONTINUED] acetaminophen **OR** acetaminophen, glucose, dextrose bolus **OR** dextrose bolus, glucagon (rDNA), dextrose  IV Drips/Infusions   sodium chloride      dextrose       Vitals    Vitals    height is 5' 10\" (1.778 m) and weight is 200 lb (90.7 kg). His oral temperature is 98.2 °F (36.8 °C). His blood pressure is 108/86 and his pulse is 66. His respiration is 18 and oxygen saturation is 99%. I/O    Intake/Output Summary (Last 24 hours) at 12/19/2022 1352  Last data filed at 12/19/2022 1150  Gross per 24 hour   Intake 240 ml   Output --   Net 240 ml       Patient Vitals for the past 96 hrs (Last 3 readings):   Weight   12/17/22 0344 200 lb (90.7 kg)       Exam     Physical Exam   Constitutional: No distress on RA. Patient appears moderately built and  moderately nourished. Head: Normocephalic and atraumatic. Mouth/Throat: Oropharynx is clear and moist.  No oral thrush. Eyes: Conjunctivae are normal. Pupils are equal, round. No scleral icterus. Neck: Neck supple. No tracheal deviation present. Cardiovascular: S1 and S2 with no murmur. No peripheral edema  Pulmonary/Chest: Normal effort with bilateral air entry, clear breath sounds. No stridor. No respiratory distress. Patient exhibits no tenderness. Abdominal: Soft. Bowel sounds audible. No distension or tenderness to palp.    Musculoskeletal: Moves all extremities  Neurological: Patient is alert and noted mild confusion reorients easily follows simple commands history of dementia      Labs   ABG  No results found for: PH, PO2, PCO2, HCO3, O2SAT  No results found for: HARDIK Vázquez  CBC  Recent Labs     12/17/22  0802 12/18/22  0607 12/19/22  0735   WBC 24.4* 23.3* 21.1*   RBC 3.87* 3.92* 3.92*   HGB 10.8* 10.9* 10.9*   HCT 34.6* 35.0* 34.7*   MCV 89.4 89.3 88.5   MCH 27.9 27.8 27.8   MCHC 31.2* 31.1* 31.4*    265 266   MPV 11.5 11.4 11.5        BMP  Recent Labs     12/17/22  0802 12/18/22  0438 12/19/22  0735    138 136   K 4.1 4.6 4.0    103 103   CO2 23 21* 24   BUN 19 20 14   CREATININE 0.9 0.9 0.7   GLUCOSE 134* 90 109*   CALCIUM 9.5 9.9 9.4 LFT  No results for input(s): AST, ALT, ALB, BILITOT, ALKPHOS, LIPASE in the last 72 hours. Invalid input(s): AMYLASE  TROP  Lab Results   Component Value Date/Time    TROPONINT < 0.010 04/06/2022 10:39 AM    TROPONINT < 0.010 04/06/2022 07:00 AM    TROPONINT < 0.010 12/20/2018 03:40 AM     BNP  Lab Results   Component Value Date/Time    PROBNP 147.2 04/06/2022 07:00 AM    PROBNP 114.2 12/19/2018 06:15 PM     D-Dimer  Lab Results   Component Value Date/Time    DDIMER 1492.00 11/05/2018 06:28 PM     Lactic Acid  No results for input(s): LACTA in the last 72 hours. INR  Recent Labs     12/17/22  0802   INR 1.06       PTT  No results for input(s): APTT in the last 72 hours. Glucose  Recent Labs     12/18/22  1957 12/19/22  0751 12/19/22  1219   POCGLU 133* 115* 118*       UA No results for input(s): SPECGRAV, PHUR, COLORU, CLARITYU, MUCUS, PROTEINU, BLOODU, RBCUA, WBCUA, BACTERIA, NITRU, GLUCOSEU, BILIRUBINUR, UROBILINOGEN, KETUA, LABCAST, LABCASTTY, AMORPHOS in the last 72 hours. Invalid input(s): CRYSTALS. PFTs   None  Echo    Conclusions      Summary   Ejection fraction is visually estimated at 60%. Overall left ventricular function is normal.   Aortic valve appears tricuspid. Aortic valve leaflets are somewhat thickened. Aortic valve leaflets are Mildly calcified. Signature      ----------------------------------------------------------------   Electronically signed by Veria Kayser MD (Interpreting   physician) on 04/08/2022 at 07:02 AM   ----------------------------------------------------------------    Cultures    Procalcitonin  No results found for: [facility]     Radiology    CXR    CT Scans    CT angiography chest     12/17/2022     Findings: There is no evidence of pulmonary embolus. The thoracic aorta is ectatic and contains calcified and noncalcified atherosclerotic plaque.  There is aneurysmal dilatation of the descending thoracic aorta which has a maximum transverse dimension of 3.8 cm. There are several ulcerated plaques within the distal descending thoracic aorta just above the level of the aortic hiatus. Heart size within normal limits. RV/LV ratio normal.  There is a large heterogeneously enhancing mediastinal mass which measures approximately 5.7 x 5.9 x 5.5 cm. This mass encases and possibly invades portions of the superior vena cava which is markedly narrowed but remains patent to the level of the right atrium. This lesion also encases and severely narrows the distal right pulmonary artery. There is associated right hilar and subcarinal adenopathy. There are linear areas of subsegmental atelectasis versus scarring bilaterally. Small tree-in-bud opacities are seen within the right upper lobe and may represent small airways disease. However, there is also a slightly irregular 7 mm right upper lobe pulmonary nodule (image 31 series 2). A focus of pulmonary metastatic disease is not excluded. No focal consolidation or pleural effusion is present. There is a small endobronchial filling defect at the origin of the right mainstem bronchus measuring 7 mm in size. This abuts the mediastinal mass and invasion of this mass into the bronchus is not excluded. There are no acute fractures. Impression:   1. No evidence of pulmonary embolus. 2. Descending thoracic aortic aneurysm with ulcerated plaque within the distal thoracic aorta. 3. Large mediastinal mass which encases the superior vena cava and the distal right pulmonary artery, consistent with malignancy. 4. Right upper lobe pulmonary nodule which may represent a focus of metastatic disease   5. Small endobronchial filling defect at the origin of the right mainstem bronchus as discussed above.        (See actual reports for details)    Assessment   -Large mediastinal mass encasing the SVC, no evidence of SVC syndrome or thrombosis at this stage   -Hx COPD-No PFT available   -CAD s/p PCI on plavix   -DM2  -Alzheimer dementia  Recommendations   -No family present spoke with RN [facility] regarding planning   -Evaluated by Dr. Kimberly Salas for possible EBUS s/p TEG   -TEG results reviewed EBUS planned for Friday after washout of plavix and ASA with Dr. Tabitha Ortiz  -Scheduled 12/23/2022 instruction included in discharge summary with office phone number to contact with any questions or concerns   -Continue to Hold plavix and ASA until EBUS procedure completed on 12/23/2022 nothing by mouth after midnight evening prior to procedure    Case discussed with nurse and patient/family. Questions and concerns addressed. Meds and Orders reviewed. Electronically signed by   LAURA Kuo CNP on 12/19/2022 at 1:52 PM    Addendum by Dr. Tabitha Ortiz MD:  Patient seen by me independently including key components of medical care. Face to face evaluation and examination was performed. Case discussed with Mr. Scottclaudette ROHAN Cox  Italicized font, if present,  represents changes to the note made by me. More than 50% of the encounter time involved with patient care by the Pulmonary & Critical care service team spent by me . Please see my modifications mentioned below:    Plan:  -Schedule patient for Flexible fiberoptic bronchoscopy and endobronchial ultrasound guided (EBUS) guided biopsy of lymph node in endoscopy at [facility] with anesthesia from anaesthesilogy department. The  Procedure is going to be done under general anesthesia. Please co ordinate care with  [facility] for their assistance regarding on site JAMES (Rapid on site evaluation) during the procedure. Please inform Endoscopy suite to provide EBUS and staff.   -Jimena Cordova educated and informed about bronchoscopy along with EBUS procedures,methods, complicantions including but not limited to development of Pneumothorax, Pneumomediastinum with requirement of Chest tube placement, bleeding complications along with damage to soft tissue structures.  He agreed for the procedure and verbalizes understanding.     Electronically signed by   Litzy Mayo MD on 12/19/2022 at 7:14 PM

## 2022-12-19 NOTE — DISCHARGE INSTRUCTIONS
You are schedule for Endobronchial Ultrasound bronchoscopy for biopsy 12/23/2022 for this procedure be at the hospital at 11:30 AM Do not eat or drink anything after midnight the morning of the procedure. Do not take plavix until after procedure is completed     If you have any questions regarding procedure or instructions call Dr. Allison Babcock office [phone].

## 2022-12-20 ASSESSMENT — ENCOUNTER SYMPTOMS
VOMITING: 0
BACK PAIN: 0
CHEST TIGHTNESS: 0
EYE REDNESS: 0
SHORTNESS OF BREATH: 0
COLOR CHANGE: 0
NAUSEA: 0
SINUS PRESSURE: 0

## 2022-12-20 NOTE — DISCHARGE SUMMARY
Discharge Summary    Patient:  Alexis Jerez  YOB: 1950    MRN: [id number]   Acct: [de-identified]    Primary Care Physician: Tali Godinez MD    Admit date:  12/17/2022    Discharge date:  12/19/2022       Discharge Diagnoses:   Mediastinal mass  Principal Problem:    Mediastinal mass  Resolved Problems:    * No resolved hospital problems. *      Admitted for: (HPI)  History obtained from the patient and wife. The patient is a 74 y.o. male who presents with complaints of a 1 day history of chest pain patient described as sharp stabbing pain that radiated to his back for the last day. Patient is accompanied by his wife who is providing the history as he has Alzheimer's dementia. She denies any previous reports of chest pain. She denies any nausea, vomiting, fevers or chills. No reports of cough. Patient had presented to [facility] and CT imaging of the chest showed a mediastinal mass encapsulating the SVC. Patient was transferred over for cardiothoracic surgery eval.    Hospital Course:  74year old male with newly found mediastinal mass encapsulating the SVC. Patient was transferred from [facility] for cardiothoracic surgery eval.  He was seen by Dr. Sean Daniels who reviewed the case and reported no signs of obstruction. Cardiothoracic surgery signed off and recommended EBUS and oncology consult. Patient was on aspirin, Plavix and Pletal.  These were held on admission. Patient is hemodynamically stable and not requiring any supplemental O2 so is being discharged home with family. He is to return on Friday for outpatient EBUS by pulmonology. Patient is also scheduled to follow-up with oncology 2 weeks from now in order to review tissue diagnosis and treatment plans. Assessment/Plan:     Mediastinal mass encapsulating SVC: CT chest reviewed from outside facility. Patient is currently chest pain-free.   Cardiothoracic surgery evaluated patient reports mass is nonobstructing of the SVC. Pulmonology evaluated and will need EBUS which is scheduled for Friday. Hem onc follow up in 2 weeks  Diabetes type 2: As needed sliding scale  COPD: Not in acute exacerbation and on room air at baseline. Coronary artery disease: History of PCI, currently on carvedilol, Plavix, atorvastatin. Plavix, ASA on hold  Hypertension: BP well controlled, continue with carvedilol. Alzheimer's dementia: Continue with donepezil.     Consultants:  Patient Care Team:  Rere Wilkins MD as PCP - General (Family Medicine)    Discharge Medications:       Medication List        CONTINUE taking these medications      acetaminophen 325 MG tablet  Commonly known as: TYLENOL  Take 2 tablets by mouth every 4 hours as needed for Pain     amLODIPine 10 MG tablet  Commonly known as: NORVASC  Take 1 tablet by mouth daily     atorvastatin 80 MG tablet  Commonly known as: LIPITOR  Take 1 tablet by mouth daily     carvedilol 12.5 MG tablet  Commonly known as: COREG     donepezil 10 MG tablet  Commonly known as: ARICEPT     hydroCHLOROthiazide 25 MG tablet  Commonly known as: HYDRODIURIL     nitroGLYCERIN 0.4 MG SL tablet  Commonly known as: NITROSTAT  Place 1 tablet under the tongue every 5 minutes as needed for Chest pain     OMEGA 3-6-9 FATTY ACIDS PO     pantoprazole 40 MG tablet  Commonly known as: PROTONIX  Take 1 tablet by mouth every morning (before breakfast)     traZODone 100 MG tablet  Commonly known as: DESYREL            STOP taking these medications      albuterol sulfate  (90 Base) MCG/ACT inhaler  Commonly known as: PROVENTIL;VENTOLIN;PROAIR     aspirin 81 MG EC tablet     cilostazol 100 MG tablet  Commonly known as: PLETAL     clopidogrel 75 MG tablet  Commonly known as: PLAVIX                Physical Exam:    Vitals:  Vitals:    12/19/22 0400 12/19/22 0744 12/19/22 1150 12/19/22 1600   BP: 137/81 131/60 108/86 122/78   Pulse: 63 63 66 67   Resp: 20 18 18    Temp: 97.9 °F (36.6 °C) 98.1 °F (36.7 °C) 98.2 °F (36.8 °C)    TempSrc: Oral Oral Oral    SpO2: 98% 95% 99%    Weight:       Height:         Weight: Weight: 200 lb (90.7 kg)     24 hour intake/output:  Intake/Output Summary (Last 24 hours) at 12/20/2022 0751  Last data filed at 12/19/2022 1150  Gross per 24 hour   Intake 240 ml   Output --   Net 240 ml       General appearance - alert awake appears to be in no acute distress  Chest - Bilateral air entry, no wheeze  Heart - S1S2 RRR, no murmurs or gallops  Abdomen - soft, non tender, normoactive bowel sounds  Neurological - non focal  Extremities - no edema    Procedures:  na    Diagnostic Test:      Radiology reports as per the Radiologist  Radiology:  CT INTERPRETATION OF OUTSIDE IMAGES    Result Date: 12/17/2022  CT angiography chest using contrast. 3-D postprocessing Comparison: None Findings: There is no evidence of pulmonary embolus. The thoracic aorta is ectatic and contains calcified and noncalcified atherosclerotic plaque. There is aneurysmal dilatation of the descending thoracic aorta which has a maximum transverse dimension of 3.8 cm. There are several ulcerated plaques within the distal descending thoracic aorta just above the level of the aortic hiatus. Heart size within normal limits. RV/LV ratio normal. There is a large heterogeneously enhancing mediastinal mass which measures approximately 5.7 x 5.9 x 5.5 cm. This mass encases and possibly invades portions of the superior vena cava which is markedly narrowed but remains patent to the level of the right atrium. This lesion also encases and severely narrows the distal right pulmonary artery. There is associated right hilar and subcarinal adenopathy. There are linear areas of subsegmental atelectasis versus scarring bilaterally. Small tree-in-bud opacities are seen within the right upper lobe and may represent small airways disease. However, there is also a slightly irregular 7 mm right upper lobe pulmonary nodule (image 31 series 2). A focus of pulmonary metastatic disease is not excluded. No focal consolidation or pleural effusion is present. There is a small endobronchial filling defect at the origin of the right mainstem bronchus measuring 7 mm in size. This abuts the mediastinal mass and invasion of this mass into the bronchus is not excluded. There are no acute fractures. Impression: 1. No evidence of pulmonary embolus. 2. Descending thoracic aortic aneurysm with ulcerated plaque within the distal thoracic aorta. 3. Large mediastinal mass which encases the superior vena cava and the distal right pulmonary artery, consistent with malignancy. 4. Right upper lobe pulmonary nodule which may represent a focus of metastatic disease 5. Small endobronchial filling defect at the origin of the right mainstem bronchus as discussed above. ___________________________________ CT angiogram of the abdomen and pelvis with contrast Comparison: None Findings: There is ectasia and atherosclerotic calcification throughout the abdominal aorta. There is aneurysmal dilatation of the infrarenal abdominal aorta which has a transverse dimension of 3.6 cm. There is also diffuse aneurysmal dilatation of the right common iliac artery which has a transverse dimension of 1.8 cm. The pelvic vasculature is patent with multifocal areas of moderate stenosis within the bilateral common iliac arteries and right external iliac artery. Atherosclerotic plaque results in moderate to severe narrowing at the origin of the right renal artery and moderate narrowing at the origin of the left renal artery. 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 There is a focal area of mild to moderate stenosis near the origin of the celiac artery. The inferior mesenteric artery is clearly visualized and may be occluded.  There is a too small to characterize low-density lesion within the anterior segment of the right hepatic lobe. The liver is otherwise homogeneous. Multiple gallstones are present. The pancreas, spleen and visualized portions of the adrenal glands are normal in appearance. There are small well-circumscribed low-density lesions in both kidneys which are likely to represent cysts. In addition, there is a poorly defined heterogeneous lesion arising from the upper pole of the right kidney which measures approximately 1.9 x 2.2 x 1.4 cm in size. This latter finding is concerning for a neoplasm. There is no bowel distention or displacement. Distal colonic diverticulosis is present. A moderate to large stool burden is present. There is limited assessment of the pelvis due to streak artifact from bilateral total hip prostheses. The visualized portions of the urinary bladder are unremarkable. There are no acute bony abnormalities. No soft tissue abnormalities are present. Impression: 1. Infrarenal abdominal aortic aneurysm with a transverse dimension of 3.6 cm. Extensive atherosclerotic disease with multifocal areas of vessel stenosis as outlined above. 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 2. Possible right renal neoplasm. The lesion in the upper pole of the right kidney could be further assessed with MRI as clinically warranted. 3. Cholelithiasis. 4. Moderate to large burden. Colonic diverticulosis. This document has been electronically signed by: Nikita Camacho MD on 12/17/2022 06:14 AM All CTs at this facility use dose modulation techniques and iterative reconstructions, and/or weight-based dosing when appropriate to reduce radiation to a low as reasonably achievable. 3D Post-processing was performed on this study.       Diet:  regular    Activity:  Activity as tolerated (Patient may move about with assist as indicated or with supervision.)    Follow-up:  in the next few days with Rere Wilkins MD, Disposition: home    Condition: Stable      Time Spent: 35 minutes    Electronically signed by Barber Rowland MD on 12/20/2022 at 7:51 AM    Discharging Hospitalist

## 2022-12-20 NOTE — CONSULTS
[address] [place] [address]                                  CONSULTATION    PATIENT NAME: Pamela November                     :        1950  MED REC NO:   [id number]                           ROOM:       0025  ACCOUNT NO:   [de-identified]                           ADMIT DATE: 2022  PROVIDER:     Lis Pope M.D.    [facility] DATE:  2022    ONCOLOGY CONSULTATION    HISTORY OF PRESENT ILLNESS:  The patient is a 75-year-old gentleman who  was admitted to [facility] for further evaluation. Apparently,  the patient presented with chest pain one day before the admission. The  patient did go to [facility].  CAT scan of the chest  showed a mediastinal mass encapsulating the superior vena cava. The  patient was transferred for evaluation. The patient was seen by  Cardiothoracic Surgery and Pulmonary Surgery. The plan was to get a  biopsy, but because he was taking aspirin and Plavix, this was put on  hold. It was decided to discharge the patient and to see as an  outpatient. I was asked to see the patient for evaluation. At the time  of seeing the patient, the patient was free of pain. He denies any loss  of weight. He denies any history of hemoptysis. He denies any history  of bone pain or bone ache. PAST MEDICAL HISTORY:  1. Arthritis. 2.  Asthma. 3.  Coronary artery disease. 4.  COPD. 5.  Diabetes. 6.  GERD. 7.  Hyperlipidemia. 8.  Hypertension. PAST SURGICAL HISTORY:  1. Cardiac catheterization. 2.  Carotid endarterectomy. 3.  Eye surgery for detached retina. 4.  Tonsillectomy. 5.  Total hip replacement. MEDICATIONS:  Please see medication list.    ALLERGIES:  THE PATIENT HAS NO ALLERGY. SOCIAL HISTORY:  He lives with his wife. He quit smoking seven years  back. He has 15 pack-year smoking. He does not drink alcohol.     FAMILY HISTORY:  Mother had history of bladder cancer. She had also  diabetes. REVIEW OF SYSTEMS:  Twelve systems were reviewed. Pertinent positive  findings were mentioned in the history of present illness. PHYSICAL EXAMINATION:  GENERAL:  He is a pleasant  male, in no respiratory  distress. VITAL SIGNS:  His weight is 200 pounds. Temperature is 98.2,  respirations 18, pulse 66, blood pressure 108/86, oxygen saturation 99%  on room air. HEENT:  Normocephalic, atraumatic. NECK:  Supple. No JVD. No bruit. No thyromegaly. No lymphadenopathy. CHEST:  Bilateral fair air entry with no rales or rhonchi. HEART:  S1, S2. No S3. No S4. No murmur. ABDOMEN:  Soft. No organomegaly. EXTREMITIES:  No clubbing. No cyanosis. No edema. LABORATORY DATA:  BUN 14, creatinine 0.7, blood glucose 109. White  blood cells 21,000, hemoglobin 10.9 gm, hematocrit 34.7%, and platelets  710. His INR is 1.06. RADIOLOGY DATA:  CAT scan angiography:  No evidence of pulmonary  embolism. Descending aortic aneurysm with ulceration plaque within the  distal thoracic, large mediastinal mass which encases the superior vena  cava and distal right pulmonary artery consistent with malignancy. ASSESSMENT AND PLAN:  This is a 73-year-old gentleman who has history of  smoking. He does have a mediastinal mass. The patient has been  scheduled to get a biopsy as an outside patient. I am going to see the  patient in my office in two weeks for further management. We thank you for the consult.         Emmett Diana M.D.    D: 12/20/2022 10:02:36       T: 12/20/2022 11:29:26     AK/BANDAR_BUD  Job#: [id number]     Doc#: [id number]    CC:

## 2022-12-21 NOTE — H&P
[facility]  Pulmonary medicine clinic preoperative history and physical examination note. Patient: Shabana Soares  : [id number]      Chief complaint/[place]:     Shabana Soares is a 74 y.o. old male was recently admitted to [facility] and was subsequently discharged. He was noted to have a large mediastinal mass on CT angiogram of chest dated 2022. He was planned for EBUS procedure at the request of Dr. Chica Bragg MD from cardiothoracic surgery service who evaluated the patient on 2020. Patient used to be on treatment with the Plavix. The Plavix was held after obtaining permission of the primary service. Patient is scheduled for EBUS procedure to further evaluate the etiology of his mediastinal lymphadenopathy after completion of Plavix washout.      He underwent chest CT scan on: 2022  The above chest  CT was performed at: [facility] in [facility]    He was ever seen by a pulmonologist in the past:no  He was ever diagnosed with lung nodule or Lung cancer in the past: no  He was ever diagnosed with Mediastinal Lymphadenopathy in the past:no  He ever diagnosed with any extrapulmonary cancers I.e Breast,Colon etc: no    He was ever diagnosed with following pulmonary diseases:  Bronchial asthma: NO  COPD:NO  Pulmonary fibrosis:NO  Sarcoidosis:NO  Pulmonary embolism: NO  History of DVT in the past: NO     Pulmonary hypertension:NO  Pleural effusion/s in the past:NO    He ever diagnosed with pulmonary tuberculosis in the past:NO  He ever recently exposed to any patients with tuberculosis:NO  He ever recently travelled to endemic places of tuberculosis:NO  His ever tested for PPD in the past: NO    He ever diagnosed with  connective tissue diseases including Systemic lupus Erythematosus or Rheumatoid arthritis: no    He is currently using any oxygen supplementation at rest, exercise or during sleep/at night time:No      Social History:              Socioeconomic History    Marital status:        Spouse name: Diane Calabrese    Number of children: 5    Years of education: Not on file    Highest education level: Not on file   Occupational History    Not on file   Tobacco Use    Smoking status: Former       Packs/day: 0.75       Years: 20.00       Pack years: 15.00       Types: Cigarettes       Quit date: 2015       Years since quittin.8    Smokeless tobacco: Never   Vaping Use    Vaping Use: Never used   Substance and Sexual Activity    Alcohol use: No    Drug use: No    Sexual activity: Not on file   Other Topics Concern    Not on file   Social History Narrative    Not on file                            Review of Systems:   General/Constitutional: No recent loss of weight or appetite changes. No fever or chills. HENT: Negative. Eyes: Negative. Upper respiratory tract: No nasal stuffiness or post nasal drip. Lower respiratory tract/ lungs: See HPI. No hemoptysis. Cardiovascular: No palpitations or chest pain. Gastrointestinal: No nausea or vomiting. Neurological: No focal neurologiacal weakness. Extremities: No edema. Musculoskeletal: No complaints. Genitourinary: No complaints. Hematological: Negative. Psychiatric/Behavioral: Negative. Skin: No itching.       Current Medications:          Past Medical History:   Diagnosis Date    Arthritis     Asthma     Blood circulation, collateral     bilateral LE    CAD (coronary artery disease)     COPD (chronic obstructive pulmonary disease) (HCC)     Diabetes mellitus ([address]     in the past    GERD (gastroesophageal reflux disease)     Hyperlipidemia     Hypertension     Other disorders of kidney and ureter in diseases classified elsewhere     Psychiatric problem        Past Surgical History:   Procedure Laterality Date    CARDIAC CATHETERIZATION      CAROTID ENDARTERECTOMY Left 2018    LEFT CAROTID ENDARTERECTOMY performed by Israel Barrera MD Vaishali at [facility]      right eye for detached retina    TONSILLECTOMY      TOTAL HIP ARTHROPLASTY Right 7/21/2022    RIGHT TOTAL HIP REPLACEMENT performed by Ana Isbell MD at [facility]       No Known Allergies    Current Facility-Administered Medications   Medication Dose Route Frequency Provider Last Rate Last Admin    0.9 % sodium chloride infusion   IntraVENous Continuous Virginia Yee MD 75 mL/hr at 12/22/22 1436 New Bag at 12/22/22 1436       Family History   Problem Relation Age of Onset    Cancer Mother         bladder    Diabetes Mother     Asthma Father     Diabetes Sister     Heart Disease Brother         MI @ 52    Heart Disease Sister         MI in 45s         Physical Exam     VITALS:  BP (!) 156/74   Pulse 71   Temp (!) 96 °F (35.6 °C) (Temporal)   Resp 18   Ht 5' 10\" (1.778 m)   Wt 183 lb (83 kg)   SpO2 98%   BMI 26.26 kg/m²   Nursing note and vitals reviewed. Constitutional: Patient appears moderately built and moderately nourished. No distress. Patient is oriented to person, place, and time. HENT:   Head: Normocephalic and atraumatic. Right Ear: External ear normal.   Left Ear: External ear normal.   Mouth/Throat: Oropharynx is clear and moist.  No oral thrush. Eyes: Conjunctivae are normal. Pupils are equal, round, and reactive to light. No scleral icterus. Neck: Neck supple. No JVD present. No tracheal deviation present. Cardiovascular: Normal rate, regular rhythm, normal heart sounds. No murmur heard. Pulmonary/Chest: Effort normal and breath sounds normal. No stridor. No respiratory distress. No wheezes. No rales. Patient exhibits no tenderness. Abdominal: Soft. Patient exhibits no distension. No tenderness. Musculoskeletal: Normal range of motion. Extremities: Patient exhibits no edema and no tenderness. Lymphadenopathy:  No cervical adenopathy.    Neurological: Patient is alert and oriented to person, place, and time.   Skin: Skin is warm and dry. Patient is not diaphoretic. Psychiatric: Patient  has a normal mood and affect. Patient behavior is normal.       Diagnostic Data:    Radiological Data:  CT Scans  CT angiography chest   12/17/2022   Findings: There is no evidence of pulmonary embolus. The thoracic aorta is ectatic and contains calcified and noncalcified atherosclerotic plaque. There is aneurysmal dilatation of the descending thoracic aorta which has a maximum transverse dimension of 3.8 cm. There are several ulcerated plaques within the distal descending thoracic aorta just above the level of the aortic hiatus. Heart size within normal limits. RV/LV ratio normal.  There is a large heterogeneously enhancing mediastinal mass which measures approximately 5.7 x 5.9 x 5.5 cm. This mass encases and possibly invades portions of the superior vena cava which is markedly narrowed but remains patent to the level of the right atrium. This lesion also encases and severely narrows the distal right pulmonary artery. There is associated right hilar and subcarinal adenopathy. There are linear areas of subsegmental atelectasis versus scarring bilaterally. Small tree-in-bud opacities are seen within the right upper lobe and may represent small airways disease. However, there is also a slightly irregular 7 mm right upper lobe pulmonary nodule (image 31 series 2). A focus of pulmonary metastatic disease is not excluded. No focal consolidation or pleural effusion is present. There is a small endobronchial filling defect at the origin of the right mainstem bronchus measuring 7 mm in size. This abuts the mediastinal mass and invasion of this mass into the bronchus is not excluded. There are no acute fractures. Impression:   1. No evidence of pulmonary embolus. 2. Descending thoracic aortic aneurysm with ulcerated plaque within the distal thoracic aorta.    3. Large mediastinal mass which encases the superior vena cava and the distal right pulmonary artery, consistent with malignancy. 4. Right upper lobe pulmonary nodule which may represent a focus of metastatic disease   5. Small endobronchial filling defect at the origin of the right mainstem bronchus as discussed above. (See actual reports for details)      CT angiogram of the chest with contrast performed on 19 December 2018:  No acute pulmonary embolism. No acute pneumonia Stable 3.7 x 3.8 cm descending thoracic aortic aneurysm with mild to moderate mural thrombus most pronounced at the level of the diaphragmatic aortic hiatus. Stable possible small plaque ulcer involving the lateral wall of the distal descending thoracic aorta. Pulmonary function tests:  None in epic        Echocardiogram with 2D Doppler:  Transthoracic Echocardiography Report (TTE)     Demographics      Patient Name    Shruti Torres Gender               Male      MR #            [id number]      Race                 Black                                     Ethnicity      Account #       [de-identified]      Room Number          0021        Conclusions      Summary   Ejection fraction is visually estimated at 60%. Overall left ventricular function is normal.   Aortic valve appears tricuspid. Aortic valve leaflets are somewhat thickened. Aortic valve leaflets are Mildly calcified. Signature      ----------------------------------------------------------------   Electronically signed by Nataly Bautista MD (Interpreting   physician) on 04/08/2022 at 07:02 AM   ----------------------------------------------------------------    Right Ventricle   The right ventricular size was normal with normal systolic function and   wall thickness. Assessment:  -Mediastinal Lymphadenopathy of uncertain etiology.  Differential includes Neoplastic process Vs Inflammatory Vs Granulomatous disease.  -Large mediastinal mass encasing the SVC, no evidence of SVC syndrome or thrombosis at this stage   -Hx COPD-No PFT

## 2022-12-22 ENCOUNTER — ANESTHESIA EVENT (OUTPATIENT)
Dept: ENDOSCOPY | Age: 72
End: 2022-12-22
Payer: OTHER GOVERNMENT

## 2022-12-22 ENCOUNTER — HOSPITAL ENCOUNTER (OUTPATIENT)
Age: 72
Setting detail: OUTPATIENT SURGERY
Discharge: HOME | End: 2022-12-22
Attending: INTERNAL MEDICINE | Admitting: INTERNAL MEDICINE
Payer: OTHER GOVERNMENT

## 2022-12-22 ENCOUNTER — APPOINTMENT (OUTPATIENT)
Dept: GENERAL RADIOLOGY | Age: 72
End: 2022-12-22
Attending: INTERNAL MEDICINE
Payer: OTHER GOVERNMENT

## 2022-12-22 ENCOUNTER — ANESTHESIA (OUTPATIENT)
Dept: ENDOSCOPY | Age: 72
End: 2022-12-22
Payer: OTHER GOVERNMENT

## 2022-12-22 VITALS
WEIGHT: 183 LBS | SYSTOLIC BLOOD PRESSURE: 160 MMHG | OXYGEN SATURATION: 97 % | HEART RATE: 71 BPM | DIASTOLIC BLOOD PRESSURE: 69 MMHG | BODY MASS INDEX: 26.2 KG/M2 | TEMPERATURE: 97.1 F | HEIGHT: 70 IN | RESPIRATION RATE: 16 BRPM

## 2022-12-22 PROCEDURE — 88172 CYTP DX EVAL FNA 1ST EA SITE: CPT

## 2022-12-22 PROCEDURE — 3609020000 HC BRONCHOSCOPY W/EBUS FNA: Performed by: INTERNAL MEDICINE

## 2022-12-22 PROCEDURE — 88177 CYTP FNA EVAL EA ADDL: CPT

## 2022-12-22 PROCEDURE — 88341 IMHCHEM/IMCYTCHM EA ADD ANTB: CPT

## 2022-12-22 PROCEDURE — 7100000001 HC PACU RECOVERY - ADDTL 15 MIN: Performed by: INTERNAL MEDICINE

## 2022-12-22 PROCEDURE — 88173 CYTOPATH EVAL FNA REPORT: CPT

## 2022-12-22 PROCEDURE — 3700000001 HC ADD 15 MINUTES (ANESTHESIA): Performed by: INTERNAL MEDICINE

## 2022-12-22 PROCEDURE — 88305 TISSUE EXAM BY PATHOLOGIST: CPT

## 2022-12-22 PROCEDURE — 7100000011 HC PHASE II RECOVERY - ADDTL 15 MIN: Performed by: INTERNAL MEDICINE

## 2022-12-22 PROCEDURE — 2580000003 HC RX 258: Performed by: INTERNAL MEDICINE

## 2022-12-22 PROCEDURE — 7100000010 HC PHASE II RECOVERY - FIRST 15 MIN: Performed by: INTERNAL MEDICINE

## 2022-12-22 PROCEDURE — 2720000010 HC SURG SUPPLY STERILE: Performed by: INTERNAL MEDICINE

## 2022-12-22 PROCEDURE — 71045 X-RAY EXAM CHEST 1 VIEW: CPT

## 2022-12-22 PROCEDURE — 3700000000 HC ANESTHESIA ATTENDED CARE: Performed by: INTERNAL MEDICINE

## 2022-12-22 PROCEDURE — 2500000003 HC RX 250 WO HCPCS: Performed by: REGISTERED NURSE

## 2022-12-22 PROCEDURE — 88342 IMHCHEM/IMCYTCHM 1ST ANTB: CPT

## 2022-12-22 PROCEDURE — 7100000000 HC PACU RECOVERY - FIRST 15 MIN: Performed by: INTERNAL MEDICINE

## 2022-12-22 PROCEDURE — 6360000002 HC RX W HCPCS: Performed by: REGISTERED NURSE

## 2022-12-22 RX ORDER — DEXAMETHASONE SODIUM PHOSPHATE 4 MG/ML
INJECTION, SOLUTION INTRA-ARTICULAR; INTRALESIONAL; INTRAMUSCULAR; INTRAVENOUS; SOFT TISSUE PRN
Status: DISCONTINUED | OUTPATIENT
Start: 2022-12-22 | End: 2022-12-22 | Stop reason: SDUPTHER

## 2022-12-22 RX ORDER — SODIUM CHLORIDE 9 MG/ML
INJECTION, SOLUTION INTRAVENOUS CONTINUOUS
Status: DISCONTINUED | OUTPATIENT
Start: 2022-12-22 | End: 2022-12-22 | Stop reason: HOSPADM

## 2022-12-22 RX ORDER — LIDOCAINE HYDROCHLORIDE 20 MG/ML
INJECTION, SOLUTION EPIDURAL; INFILTRATION; INTRACAUDAL; PERINEURAL PRN
Status: DISCONTINUED | OUTPATIENT
Start: 2022-12-22 | End: 2022-12-22 | Stop reason: SDUPTHER

## 2022-12-22 RX ORDER — FENTANYL CITRATE 50 UG/ML
INJECTION, SOLUTION INTRAMUSCULAR; INTRAVENOUS PRN
Status: DISCONTINUED | OUTPATIENT
Start: 2022-12-22 | End: 2022-12-22 | Stop reason: SDUPTHER

## 2022-12-22 RX ORDER — PROPOFOL 10 MG/ML
INJECTION, EMULSION INTRAVENOUS PRN
Status: DISCONTINUED | OUTPATIENT
Start: 2022-12-22 | End: 2022-12-22 | Stop reason: SDUPTHER

## 2022-12-22 RX ORDER — ROCURONIUM BROMIDE 10 MG/ML
INJECTION, SOLUTION INTRAVENOUS PRN
Status: DISCONTINUED | OUTPATIENT
Start: 2022-12-22 | End: 2022-12-22 | Stop reason: SDUPTHER

## 2022-12-22 RX ORDER — ONDANSETRON 2 MG/ML
INJECTION INTRAMUSCULAR; INTRAVENOUS PRN
Status: DISCONTINUED | OUTPATIENT
Start: 2022-12-22 | End: 2022-12-22 | Stop reason: SDUPTHER

## 2022-12-22 RX ADMIN — ONDANSETRON 4 MG: 2 INJECTION INTRAMUSCULAR; INTRAVENOUS at 15:19

## 2022-12-22 RX ADMIN — PROPOFOL 150 MG: 10 INJECTION, EMULSION INTRAVENOUS at 15:09

## 2022-12-22 RX ADMIN — FENTANYL CITRATE 100 MCG: 50 INJECTION, SOLUTION INTRAMUSCULAR; INTRAVENOUS at 15:21

## 2022-12-22 RX ADMIN — ROCURONIUM BROMIDE 10 MG: 10 INJECTION, SOLUTION INTRAVENOUS at 15:14

## 2022-12-22 RX ADMIN — LIDOCAINE HYDROCHLORIDE 80 MG: 20 INJECTION, SOLUTION EPIDURAL; INFILTRATION; INTRACAUDAL; PERINEURAL at 15:09

## 2022-12-22 RX ADMIN — SODIUM CHLORIDE: 9 INJECTION, SOLUTION INTRAVENOUS at 14:36

## 2022-12-22 RX ADMIN — SUGAMMADEX 200 MG: 100 INJECTION, SOLUTION INTRAVENOUS at 16:20

## 2022-12-22 RX ADMIN — ROCURONIUM BROMIDE 40 MG: 10 INJECTION, SOLUTION INTRAVENOUS at 15:09

## 2022-12-22 RX ADMIN — PROPOFOL 50 MG: 10 INJECTION, EMULSION INTRAVENOUS at 15:19

## 2022-12-22 RX ADMIN — DEXAMETHASONE SODIUM PHOSPHATE 8 MG: 4 INJECTION, SOLUTION INTRAMUSCULAR; INTRAVENOUS at 15:19

## 2022-12-22 ASSESSMENT — PAIN - FUNCTIONAL ASSESSMENT: PAIN_FUNCTIONAL_ASSESSMENT: NONE - DENIES PAIN

## 2022-12-22 ASSESSMENT — COPD QUESTIONNAIRES: CAT_SEVERITY: NO INTERVAL CHANGE

## 2022-12-22 NOTE — ANESTHESIA PRE PROCEDURE
Department of Anesthesiology  Preprocedure Note       Name:  Carol Real   Age:  74 y.o.  :  1950                                          MRN:  [id number]         Date:  2022      Surgeon: Polo Mccall):  Evon Hodges MD    Procedure: BRONCHOSCOPY EBUS    Medications prior to admission:   Prior to Admission medications    Medication Sig Start Date End Date Taking? Authorizing Provider   carvedilol (COREG) 12.5 MG tablet Take 6.25 mg by mouth 2 times daily (with meals) Take am of sx    Historical Provider, MD   donepezil (ARICEPT) 10 MG tablet Take 5 mg by mouth nightly 3/4/22 3/4/23  Historical Provider, MD   OMEGA 3-6-9 FATTY ACIDS PO Take 2 capsules by mouth 2 times daily Hold 5 days before sx    Historical Provider, MD   traZODone (DESYREL) 100 MG tablet Take 100 mg by mouth at bedtime 3/4/22   Historical Provider, MD   acetaminophen (TYLENOL) 325 MG tablet Take 2 tablets by mouth every 4 hours as needed for Pain 18   Gracia Dejesus PA-C   atorvastatin (LIPITOR) 80 MG tablet Take 1 tablet by mouth daily 11/10/18   Bishop Sandoval MD   pantoprazole (PROTONIX) 40 MG tablet Take 1 tablet by mouth every morning (before breakfast) 18   LAURA Villalobos CNP   hydrochlorothiazide (HYDRODIURIL) 25 MG tablet Take 25 mg by mouth daily Hold am of sx    Historical Provider, MD   nitroGLYCERIN (NITROSTAT) 0.4 MG SL tablet Place 1 tablet under the tongue every 5 minutes as needed for Chest pain 16   Kiki Sánchez MD   amLODIPine (NORVASC) 10 MG tablet Take 1 tablet by mouth daily 16   Kiki Sánchez MD       Current medications:    No current facility-administered medications for this visit. No current outpatient medications on file.      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    Chest pain R07.9    Chronic obstructive pulmonary disease (HCC) J44.9    PTSD (post-traumatic stress disorder) F43.10    Osteoarthritis of multiple joints M15.9    Peripheral vascular disease ([address] I73.9    Diabetes mellitus type 2, uncontrolled HBQ1545    Descending thoracic aortic aneurysm I71.23    Internal carotid artery stenosis, left I65.22    Chronic diastolic heart failure (HCC) I50.32    Hypertensive urgency I16.0    CAD (coronary artery disease) I25.10    Left carotid stenosis I65.22    Symptomatic bradycardia R00.1    Primary osteoarthritis of right hip M16.11    Osteoarthritis of right hip, unspecified osteoarthritis type M16.11    Mediastinal mass J98.59       Past Medical History:        Diagnosis Date    Arthritis     Asthma     Blood circulation, collateral     bilateral LE    CAD (coronary artery disease)     COPD (chronic obstructive pulmonary disease) ([facility])     Diabetes mellitus ([facility])     in the past    GERD (gastroesophageal reflux disease)     Hyperlipidemia     Hypertension     Other disorders of kidney and ureter in diseases classified elsewhere     Psychiatric problem        Past Surgical History:        Procedure Laterality Date    CARDIAC CATHETERIZATION      CAROTID ENDARTERECTOMY Left 12/26/2018    LEFT CAROTID ENDARTERECTOMY performed by Ashley Swanson MD at [facility]      right eye for detached retina    TONSILLECTOMY      TOTAL HIP ARTHROPLASTY Right 7/21/2022    RIGHT TOTAL HIP REPLACEMENT performed by Zenon Mejia MD at [facility] History:    Social History     Tobacco Use    Smoking status: Former     Packs/day: 0.75     Years: 20.00     Pack years: 15.00     Types: Cigarettes Quit date: 2015     Years since quittin.8    Smokeless tobacco: Never   Substance Use Topics    Alcohol use: No                                Counseling given: Not Answered      Vital Signs (Current): There were no vitals filed for this visit. BP Readings from Last 3 Encounters:   22 (!) 156/74   22 122/78   22 110/70       NPO Status:                                                                                 BMI:   Wt Readings from Last 3 Encounters:   22 183 lb (83 kg)   22 200 lb (90.7 kg)   22 202 lb (91.6 kg)     There is no height or weight on file to calculate BMI.    CBC:   Lab Results   Component Value Date/Time    WBC 21.1 2022 07:35 AM    RBC 3.92 2022 07:35 AM    HGB 10.9 2022 07:35 AM    HCT 34.7 2022 07:35 AM    MCV 88.5 2022 07:35 AM    RDW 12.6 2016 05:24 AM     2022 07:35 AM       CMP:   Lab Results   Component Value Date/Time     2022 07:35 AM    K 4.0 2022 07:35 AM    K 4.5 2022 05:02 PM     2022 07:35 AM    CO2 24 2022 07:35 AM    BUN 14 2022 07:35 AM    CREATININE 0.7 2022 07:35 AM    LABGLOM >60 2022 07:35 AM    GLUCOSE 109 2022 07:35 AM    PROT 6.0 2022 07:00 AM    CALCIUM 9.4 2022 07:35 AM    BILITOT 0.4 2022 07:00 AM    ALKPHOS 115 2022 07:00 AM    AST 17 2022 07:00 AM    ALT 12 2022 07:00 AM       POC Tests: No results for input(s): POCGLU, POCNA, POCK, POCCL, POCBUN, POCHEMO, POCHCT in the last 72 hours.     Coags:   Lab Results   Component Value Date/Time    INR 1.06 2022 08:02 AM    APTT 32.4 2018 11:00 AM       HCG (If Applicable): No results found for: PREGTESTUR, PREGSERUM, HCG, HCGQUANT     ABGs: No results found for: PHART, PO2ART, ZZW3NOP, NNV3OLL, BEART, U9GHLQDI     Type & Screen (If Applicable):  Lab Results   Component Value Date    [facility] POS 12/19/2022       Anesthesia Evaluation  Patient summary reviewed and Nursing notes reviewed no history of anesthetic complications:   Airway: Mallampati: II  TM distance: >3 FB   Neck ROM: full  Mouth opening: > = 3 FB   Dental:    (+) upper dentures and partials      Pulmonary: breath sounds clear to auscultation  (+) COPD: no interval change,  decreased breath sounds asthma:                            Cardiovascular:  Exercise tolerance: poor (<4 METS),   (+) hypertension:, CAD (plavix held for 5 days):,       ECG reviewed  Rhythm: regular  Rate: normal  Echocardiogram reviewed                  Neuro/Psych:      (-) seizures, TIA and CVA           GI/Hepatic/Renal:   (+) GERD: well controlled,      (-) liver disease and no renal disease       Endo/Other:    (+) DiabetesType II DM, , .    (-) hypothyroidism, hyperthyroidism               Abdominal:             Vascular:   + PVD, aortic or cerebral (left carotid), . Other Findings:             Anesthesia Plan      general     ASA 3       Induction: intravenous. MIPS: Postoperative opioids intended and Prophylactic antiemetics administered. Anesthetic plan and risks discussed with patient and spouse. Plan discussed with CRNA.     Attending anesthesiologist reviewed and agrees with [facility] LAURA Figueroa CRNA   12/22/2022

## 2022-12-22 NOTE — PRE SEDATION
[facility] SURGERY Endoscopy  Sedation Pre-Procedure Note    Patient Name: Enriqueta Lewis    YOB: 1950   Room/Bed: 8[address] Record Number: [id number]  Date: 12/22/2022  Time: 4:38 PM     Indication:  Pain control for Flexible Fibrooptic Bronchoscopy. Consent: I have discussed with the patient and/or the patient representative the indication, alternatives, and the possible risks and/or complications of the planned procedure and the anesthesia methods. The patient and/or patient representative appear to understand and agree to proceed.     Vital Signs: /76   Pulse 58   Temp (!) 96 °F (35.6 °C) (Temporal)   Resp 18   Ht 5' 10\" (1.778 m)   Wt 183 lb (83 kg)   SpO2 98%   BMI 26.26 kg/m²       Past Medical History:  Past Medical History:   Diagnosis Date    Arthritis     Asthma     Blood circulation, collateral     bilateral LE    CAD (coronary artery disease)     COPD (chronic obstructive pulmonary disease) ([facility])     Diabetes mellitus ([address]     in the past    GERD (gastroesophageal reflux disease)     Hyperlipidemia     Hypertension     Other disorders of kidney and ureter in diseases classified elsewhere     Psychiatric problem          Allergy:  No Known Allergies      Past Surgical History:  Past Surgical History:   Procedure Laterality Date    CARDIAC CATHETERIZATION      CAROTID ENDARTERECTOMY Left 12/26/2018    LEFT CAROTID ENDARTERECTOMY performed by Anurag De La Rosa MD at [facility]      right eye for detached retina    TONSILLECTOMY      TOTAL HIP ARTHROPLASTY Right 7/21/2022    RIGHT TOTAL HIP REPLACEMENT performed by Page Souza MD at [facility]       Medications:   Current Facility-Administered Medications   Medication Dose Route Frequency Provider Last Rate Last Admin    0.9 % sodium chloride infusion   IntraVENous Continuous Tamika Zimmer MD 75 mL/hr at 12/22/22 1503 NoRateChange at 12/22/22 1503     Facility-Administered Medications Ordered in Other Encounters   Medication Dose Route Frequency Provider Last Rate Last Admin    propofol injection   IntraVENous PRN Philemon Pont, APRN - CRNA   50 mg at 12/22/22 1519    rocuronium (ZEMURON) injection   IntraVENous PRN Philemon Pont, APRN - CRNA   10 mg at 12/22/22 1514    Dexamethasone Sodium Phosphate injection   IntraVENous PRN Philemon Pont, APRN - CRNA   8 mg at 12/22/22 1519    ondansetron (ZOFRAN) injection   IntraVENous PRN Philemon Pont, APRN - CRNA   4 mg at 12/22/22 1519    lidocaine PF 2 % injection   IntraVENous PRN Philemon Pont, APRN - CRNA   80 mg at 12/22/22 1509    fentaNYL (SUBLIMAZE) injection   IntraVENous PRN Philemon Pont, APRN - CRNA   100 mcg at 12/22/22 1521    sugammadex (BRIDION) 200 MG/2ML injection   IntraVENous PRN Philemon Pont, APRN - CRNA   200 mg at 12/22/22 1620         Coumadin Use Last 7 Days:  no  Antiplatelet drug therapy use last 7 days: Plavix is on hold  Other anticoagulant use last 7 days: no     Pre-Sedation Documentation and Exam:   I have personally completed a history, physical exam & review of systems for this patient (see notes). Mallampati Airway Assessment:  Please see CRNA note for details. Prior History of Anesthesia Complications:   Please see CRNA note for details. ASA Classification:  Please see CRNA note for details. Sedation/ Anesthesia Plan:   Patient was given anesthesia by CRNA Peggy from anesthesiology dept. Please see detailed note by CRNA for details.     Tamika Zimmer MD MD, [facility]  12/22/2022, 4:38 PM    Electronically signed by Tamika Zimmer MD on 12/22/2022 at 4:38 PM

## 2022-12-22 NOTE — ANESTHESIA POSTPROCEDURE EVALUATION
Department of Anesthesiology  Postprocedure Note    Patient: Radha Caro  MRN: [id number]  YOB: 1950  Date of evaluation: 12/22/2022      Procedure Summary     Date: 12/22/22 Room / Location:  [facility] [place] / [address] [place] [address]    Anesthesia Start: 1858 Anesthesia Stop: 546    Procedure: BRONCHOSCOPY EBUS Diagnosis:       Mediastinal mass      (Mediastinal mass [J98.59])    Surgeons: Debi Lewis MD Responsible Provider: Orly Carrion DO    Anesthesia Type: general ASA Status: 3          Anesthesia Type: No value filed. Ko Phase I: Ko Score: 10    Ko Phase II:        Anesthesia Post Evaluation    Comments: Klaus Bangura 58  POST-ANESTHESIA NOTE       Name:  Radha Caro                                         Age:  74 y.o. MRN:  [id number]      Last Vitals:  /71   Pulse 62   Temp 97.8 °F (36.6 °C) (Temporal)   Resp 24   Ht 5' 10\" (1.778 m)   Wt 183 lb (83 kg)   SpO2 95%   BMI 26.26 kg/m²   Patient Vitals in the past 4 hrs:  12/22/22 1640, BP:131/71, Pulse:62, Resp:24, SpO2:95 %  12/22/22 1634, BP:116/76, Temp:97.8 °F (36.6 °C), Temp src:Temporal, Pulse:58, Resp:24, SpO2:95 %  12/22/22 1419, BP:(!) 156/74, Temp:(!) 96 °F (35.6 °C), Temp src:Temporal, Pulse:71, Resp:18, SpO2:98 %, Height:5' 10\" (1.778 m), Weight:183 lb (83 kg)    Level of Consciousness:  Awake    Respiratory:  Stable    Oxygen Saturation:  Stable    Cardiovascular:  Stable    Hydration:  Adequate    PONV:  Stable    Post-op Pain:  Adequate analgesia    Post-op Assessment:  No apparent anesthetic complications    Additional Follow-Up / Treatment / Comment:  None    Gerardo Kang DO  December 22, 2022   4:47 PM

## 2022-12-22 NOTE — DISCHARGE INSTRUCTIONS
-Discharge patient home accompanied by a  if discharge criteria met and post procedure chest Xray is negative for pneumothorax.  -He was advised to restart taking his aspirin 81 mg p.o. daily, Plavix 75 mg p.o. daily and cilostazol (Pletal)100 mg p.o. twice daily starting from tomorrow morning if there is no further hemoptysis. -Patient to follow with my clinic ( Dr. Mahad Nickerson) at [facility] for pulmonary medicine as an out patient  to follow bronch results by keeping her scheduled appointment.

## 2022-12-22 NOTE — PROGRESS NOTES
Recovery mode. Patient tolerating PO fluids, Patient denies discomfort. Dr. Zoila Zamudio discussed findings with patient and wife/ Ruben Macias. Discharge instructions provided and understanding verbalized.

## 2022-12-22 NOTE — PROGRESS NOTES
Patient in endo for EBUS. Scope  used. 21 gauge visio shot needle used for 15 passes. FNA in positions 4R, 4L Station 7. Slides and 3 jars hand carried by pathology to lab. Pictures taken. Procedure completed. Patient tolerated well.

## 2022-12-22 NOTE — PROGRESS NOTES
[id number]  Patient to PACU, alert to voice. Resp easy and unlabored on 3 L NC. Denies any pain at this time. VSS    1650 Portable chest x ray complete. Patient requesting ice chips, tolerated well.    1653 Dr Capo Flanagan at bedside to discuss results of bronchoscopy. Resp easy and unlabored. VSS    1700 Patient resting in bed, resp easy and unlabored. VSS    1704 Patient meets criteria for discharge from PACU at this time. 1705 Patient transported to [facility], report given to Perlita Ac.

## 2022-12-22 NOTE — OP NOTE
Bronchoscopy with EBUS (Endobronchial ultrasound guided) Procedure Note under general anesthesia    Patient: Mandeep Isaac  : 1950      Operation: EBUS and flexible diagnostic fiberoptic bronchoscopy with out fluoroscopy. EBUS: Endobronchial ultrasound guided transbronchial (endobronchial ultrasound guided) biopsy of lymph node/s with out fluoroscopy. Date of Operation: 2022    Indication for procedure: Large mediastinal mass noted on his CT scan of chest performed on 2022-uncertain etiology. Given his history of tobacco smoking- high suspicion for neoplastic process. Pre operative diagnoses:   -Mediastinal mass/Lymphadenopathy of uncertain etiology.  -Hx COPD-No PFT available   -CAD s/p PCI on plavix   -DM2  -Alzheimer dementia. -Essential hypertension     Post operative diagnoses:   -Mediastinal mass/Lymphadenopathy of uncertain etiology.  -Hx COPD-No PFT available   -CAD s/p PCI on plavix   -DM2  -Alzheimer dementia. -Essential hypertension     Surgeon: Jannette Hilario MD    Consent: Mandeep Isaac is a 74 y.o. male . The risks, benefits, complications, treatment options and expected outcomes were discussed with the patient. Consent obtained from the patient after explaining the risks and complications of the procedure. The possibilities of reaction to medication, pulmonary aspiration, perforation of a viscus, development of pneumothorax with requirement of chest tube placement,air way bleeding, failure to diagnose a condition and creating a complication leading to respiratory failure requiring mechanical ventilation, transfusion or operation were discussed with the patient who freely signed the consent. The patient was counseled at length about the risks of dionisio Covid-19 during their perioperative period and any recovery window from their procedure.   The patient was made aware that dionisio Covid-19  may worsen their prognosis for recovering from their procedure  and lend to a higher morbidity and/or mortality risk. All material risks, benefits, and reasonable alternatives including postponing the procedure were discussed. The patient does wish to proceed with the procedure at this time. Anesthesia: Patient was given general anesthesia by CRNA  Lamonte Donohue Jamepalmira [place] 411 from anesthesiology dept. Please see anesthesiologist's note for details. Description of Procedure: The patient was taken to endoscopy suite, identified as [facility] and the procedure verified as Flexible Fiberoptic Bronchoscopy. A Time Out was held and the above information confirmed. After the induction of general anesthesia by anesthetist, the patient was placed in appropriate position. The EBUS Flexible Fibrooptic bronchoscope was passed through the [employer] which was placed over the Endotracheal tube. The scope was then passed through the endotracheal tube into the trachea. The lower end of endotracheal tube was found to be in appropriate position. EBUS (Endobronchial ultrasound):    Direct visualization of the lymph nodes was obtained using endobronchial ultrasound (EBUS) guidance. A complete ultrasound lymph node exam was performed for lymph node stations mentioned below. The following lymph nodes were subjected to EBUS guided biopsy using standard technique and in the following order. Specimen/s:    2R-  Not visualized due to the presence of Endotracheal tube. 2L-  Not visualized due to the presence of Endotracheal tube. 4R- (approximately 20mm in short axis). TBNA performed X 6-Sarah is positive for neoplastic process in slide 1 and 3. Non-small cell cancer. 4L- (approximately 6mm in short axis). TBNA performed X 4     7- (approximately 8mm in short axis). TBNA performed X 4     10R-no enlarged lymph node was noted    10L-no enlarged lymph node was noted    11R-S-no enlarged lymph node was noted    11R-I-no enlarged lymph node was noted    11L-no

## 2022-12-29 ENCOUNTER — HOSPITAL ENCOUNTER (INPATIENT)
Age: 72
LOS: 3 days | Discharge: HOME HEALTH | DRG: 872 | End: 2023-01-01
Attending: INTERNAL MEDICINE | Admitting: INTERNAL MEDICINE
Payer: OTHER GOVERNMENT

## 2022-12-29 PROBLEM — I48.91 ATRIAL FIBRILLATION WITH RVR (HCC): Status: ACTIVE | Noted: 2022-12-29

## 2022-12-29 PROCEDURE — 84484 ASSAY OF TROPONIN QUANT: CPT

## 2022-12-29 PROCEDURE — 2140000000 HC CCU INTERMEDIATE R&B

## 2022-12-29 PROCEDURE — 85025 COMPLETE CBC W/AUTO DIFF WBC: CPT

## 2022-12-29 PROCEDURE — 36415 COLL VENOUS BLD VENIPUNCTURE: CPT

## 2022-12-29 PROCEDURE — 80048 BASIC METABOLIC PNL TOTAL CA: CPT

## 2022-12-29 RX ORDER — TRAZODONE HYDROCHLORIDE 100 MG/1
100 TABLET ORAL NIGHTLY
Status: DISCONTINUED | OUTPATIENT
Start: 2022-12-30 | End: 2023-01-01 | Stop reason: HOSPADM

## 2022-12-29 RX ORDER — SODIUM CHLORIDE 9 MG/ML
INJECTION, SOLUTION INTRAVENOUS PRN
Status: DISCONTINUED | OUTPATIENT
Start: 2022-12-29 | End: 2023-01-01 | Stop reason: HOSPADM

## 2022-12-29 RX ORDER — ATORVASTATIN CALCIUM 80 MG/1
80 TABLET, FILM COATED ORAL DAILY
Status: DISCONTINUED | OUTPATIENT
Start: 2022-12-30 | End: 2023-01-01 | Stop reason: HOSPADM

## 2022-12-29 RX ORDER — AMLODIPINE BESYLATE 10 MG/1
10 TABLET ORAL DAILY
Status: DISCONTINUED | OUTPATIENT
Start: 2022-12-30 | End: 2023-01-01 | Stop reason: HOSPADM

## 2022-12-29 RX ORDER — SODIUM CHLORIDE 0.9 % (FLUSH) 0.9 %
10 SYRINGE (ML) INJECTION PRN
Status: DISCONTINUED | OUTPATIENT
Start: 2022-12-29 | End: 2023-01-01 | Stop reason: HOSPADM

## 2022-12-29 RX ORDER — PANTOPRAZOLE SODIUM 40 MG/1
40 TABLET, DELAYED RELEASE ORAL
Status: DISCONTINUED | OUTPATIENT
Start: 2022-12-30 | End: 2023-01-01 | Stop reason: HOSPADM

## 2022-12-29 RX ORDER — SODIUM CHLORIDE 0.9 % (FLUSH) 0.9 %
10 SYRINGE (ML) INJECTION EVERY 12 HOURS SCHEDULED
Status: DISCONTINUED | OUTPATIENT
Start: 2022-12-30 | End: 2023-01-01 | Stop reason: HOSPADM

## 2022-12-29 RX ORDER — POLYETHYLENE GLYCOL 3350 17 G/17G
17 POWDER, FOR SOLUTION ORAL DAILY PRN
Status: DISCONTINUED | OUTPATIENT
Start: 2022-12-29 | End: 2023-01-01 | Stop reason: HOSPADM

## 2022-12-29 RX ORDER — DONEPEZIL HYDROCHLORIDE 5 MG/1
5 TABLET, FILM COATED ORAL NIGHTLY
Status: DISCONTINUED | OUTPATIENT
Start: 2022-12-30 | End: 2023-01-01 | Stop reason: HOSPADM

## 2022-12-29 RX ORDER — HYDROCHLOROTHIAZIDE 25 MG/1
25 TABLET ORAL DAILY
Status: DISCONTINUED | OUTPATIENT
Start: 2022-12-30 | End: 2023-01-01 | Stop reason: HOSPADM

## 2022-12-29 RX ORDER — ACETAMINOPHEN 650 MG/1
650 SUPPOSITORY RECTAL EVERY 6 HOURS PRN
Status: DISCONTINUED | OUTPATIENT
Start: 2022-12-29 | End: 2023-01-01 | Stop reason: HOSPADM

## 2022-12-29 RX ORDER — CARVEDILOL 6.25 MG/1
6.25 TABLET ORAL 2 TIMES DAILY WITH MEALS
Status: DISCONTINUED | OUTPATIENT
Start: 2022-12-30 | End: 2023-01-01

## 2022-12-29 RX ORDER — ACETAMINOPHEN 325 MG/1
650 TABLET ORAL EVERY 6 HOURS PRN
Status: DISCONTINUED | OUTPATIENT
Start: 2022-12-29 | End: 2023-01-01 | Stop reason: HOSPADM

## 2022-12-29 RX ORDER — NITROGLYCERIN 0.4 MG/1
0.4 TABLET SUBLINGUAL EVERY 5 MIN PRN
Status: DISCONTINUED | OUTPATIENT
Start: 2022-12-29 | End: 2023-01-01 | Stop reason: HOSPADM

## 2022-12-29 RX ORDER — ONDANSETRON 2 MG/ML
4 INJECTION INTRAMUSCULAR; INTRAVENOUS EVERY 6 HOURS PRN
Status: DISCONTINUED | OUTPATIENT
Start: 2022-12-29 | End: 2023-01-01 | Stop reason: HOSPADM

## 2022-12-29 RX ORDER — ONDANSETRON 4 MG/1
4 TABLET, ORALLY DISINTEGRATING ORAL EVERY 8 HOURS PRN
Status: DISCONTINUED | OUTPATIENT
Start: 2022-12-29 | End: 2023-01-01 | Stop reason: HOSPADM

## 2022-12-29 NOTE — PROGRESS NOTES
Transfer  Report from [facility]  Referring Physician dr Deneen Amaya  Accepting Physician Dr Jhonatan Fiore  Patient Condition:  75 yo at [facility] presents to ER with reported sharp chest pain lower sternal area, and radiating to between his shoulder blades, and dizziness. EKG shows a-fib with RVR, new onset. All labs WNL except WBC which are [id number]. Per Physician they were the same last time he was there. CT scan shows soft tissue mass in lung putting pressure on the SVC this is not new. CT scan also shows 3.5 cm thoracic aneurysm, unchanged from previous. Patient was given Cardizem 5 mg and Lovonox. He converted to NSR. Last vitals 98.4 96 19 137/76 98% on RA.      Accepted on behalf of Dr Jhonatan Fiore to tele once bed becomes available with diagnosis of New onset a-fib with RVR

## 2022-12-30 ENCOUNTER — APPOINTMENT (OUTPATIENT)
Dept: GENERAL RADIOLOGY | Age: 72
DRG: 872 | End: 2022-12-30
Attending: INTERNAL MEDICINE
Payer: OTHER GOVERNMENT

## 2022-12-30 PROBLEM — J10.1 INFLUENZA A: Status: ACTIVE | Noted: 2022-12-30

## 2022-12-30 LAB
ANION GAP SERPL CALCULATED.3IONS-SCNC: 14 MEQ/L (ref 8–16)
BASOPHILS # BLD: 0.3 %
BASOPHILS ABSOLUTE: 0.1 THOU/MM3 (ref 0–0.1)
BUN BLDV-MCNC: 12 MG/DL (ref 7–22)
CALCIUM SERPL-MCNC: 8.8 MG/DL (ref 8.5–10.5)
CHLORIDE BLD-SCNC: 100 MEQ/L (ref 98–111)
CO2: 22 MEQ/L (ref 23–33)
CREAT SERPL-MCNC: 0.8 MG/DL (ref 0.4–1.2)
EKG ATRIAL RATE: 110 BPM
EKG ATRIAL RATE: 114 BPM
EKG P AXIS: 52 DEGREES
EKG P AXIS: 58 DEGREES
EKG P-R INTERVAL: 158 MS
EKG P-R INTERVAL: 162 MS
EKG Q-T INTERVAL: 346 MS
EKG Q-T INTERVAL: 348 MS
EKG QRS DURATION: 108 MS
EKG QRS DURATION: 110 MS
EKG QTC CALCULATION (BAZETT): 470 MS
EKG QTC CALCULATION (BAZETT): 476 MS
EKG R AXIS: -47 DEGREES
EKG R AXIS: -49 DEGREES
EKG T AXIS: 103 DEGREES
EKG T AXIS: 104 DEGREES
EKG VENTRICULAR RATE: 110 BPM
EKG VENTRICULAR RATE: 114 BPM
EOSINOPHIL # BLD: 1.4 %
EOSINOPHILS ABSOLUTE: 0.4 THOU/MM3 (ref 0–0.4)
ERYTHROCYTE [DISTWIDTH] IN BLOOD BY AUTOMATED COUNT: 13.9 % (ref 11.5–14.5)
ERYTHROCYTE [DISTWIDTH] IN BLOOD BY AUTOMATED COUNT: 44.9 FL (ref 35–45)
GFR SERPL CREATININE-BSD FRML MDRD: > 60 ML/MIN/1.73M2
GLUCOSE BLD-MCNC: 131 MG/DL (ref 70–108)
HCT VFR BLD CALC: 34.9 % (ref 42–52)
HEMOGLOBIN: 10.9 GM/DL (ref 14–18)
IMMATURE GRANS (ABS): 0.19 THOU/MM3 (ref 0–0.07)
IMMATURE GRANULOCYTES: 0.7 %
INFLUENZA A: DETECTED
INFLUENZA B: NOT DETECTED
LYMPHOCYTES # BLD: 3.9 %
LYMPHOCYTES ABSOLUTE: 1 THOU/MM3 (ref 1–4.8)
MAGNESIUM: 1.6 MG/DL (ref 1.6–2.4)
MCH RBC QN AUTO: 27.9 PG (ref 26–33)
MCHC RBC AUTO-ENTMCNC: 31.2 GM/DL (ref 32.2–35.5)
MCV RBC AUTO: 89.5 FL (ref 80–94)
MONOCYTES # BLD: 5.2 %
MONOCYTES ABSOLUTE: 1.3 THOU/MM3 (ref 0.4–1.3)
MRSA SCREEN RT-PCR: NEGATIVE
NUCLEATED RED BLOOD CELLS: 0 /100 WBC
PHOSPHORUS: 2.7 MG/DL (ref 2.4–4.7)
PLATELET # BLD: 249 THOU/MM3 (ref 130–400)
PMV BLD AUTO: 12 FL (ref 9.4–12.4)
POTASSIUM REFLEX MAGNESIUM: 3.7 MEQ/L (ref 3.5–5.2)
PROCALCITONIN: 0.09 NG/ML (ref 0.01–0.09)
RBC # BLD: 3.9 MILL/MM3 (ref 4.7–6.1)
SARS-COV-2 RNA, RT PCR: NOT DETECTED
SEG NEUTROPHILS: 88.5 %
SEGMENTED NEUTROPHILS ABSOLUTE COUNT: 22.7 THOU/MM3 (ref 1.8–7.7)
SODIUM BLD-SCNC: 136 MEQ/L (ref 135–145)
TROPONIN T: < 0.01 NG/ML
VANCOMYCIN RESISTANT ENTEROCOCCUS: NEGATIVE
WBC # BLD: 25.6 THOU/MM3 (ref 4.8–10.8)

## 2022-12-30 PROCEDURE — 87636 SARSCOV2 & INF A&B AMP PRB: CPT

## 2022-12-30 PROCEDURE — 6370000000 HC RX 637 (ALT 250 FOR IP): Performed by: INTERNAL MEDICINE

## 2022-12-30 PROCEDURE — 87040 BLOOD CULTURE FOR BACTERIA: CPT

## 2022-12-30 PROCEDURE — 93005 ELECTROCARDIOGRAM TRACING: CPT | Performed by: PHYSICIAN ASSISTANT

## 2022-12-30 PROCEDURE — 6370000000 HC RX 637 (ALT 250 FOR IP): Performed by: PHYSICIAN ASSISTANT

## 2022-12-30 PROCEDURE — 71045 X-RAY EXAM CHEST 1 VIEW: CPT

## 2022-12-30 PROCEDURE — 87500 VANOMYCIN DNA AMP PROBE: CPT

## 2022-12-30 PROCEDURE — 87641 MR-STAPH DNA AMP PROBE: CPT

## 2022-12-30 PROCEDURE — 36415 COLL VENOUS BLD VENIPUNCTURE: CPT

## 2022-12-30 PROCEDURE — 83735 ASSAY OF MAGNESIUM: CPT

## 2022-12-30 PROCEDURE — 2580000003 HC RX 258: Performed by: INTERNAL MEDICINE

## 2022-12-30 PROCEDURE — 94640 AIRWAY INHALATION TREATMENT: CPT

## 2022-12-30 PROCEDURE — 2580000003 HC RX 258: Performed by: PHYSICIAN ASSISTANT

## 2022-12-30 PROCEDURE — 84145 PROCALCITONIN (PCT): CPT

## 2022-12-30 PROCEDURE — 6360000002 HC RX W HCPCS: Performed by: PHYSICIAN ASSISTANT

## 2022-12-30 PROCEDURE — 2140000000 HC CCU INTERMEDIATE R&B

## 2022-12-30 PROCEDURE — 84100 ASSAY OF PHOSPHORUS: CPT

## 2022-12-30 RX ORDER — SODIUM CHLORIDE, SODIUM LACTATE, POTASSIUM CHLORIDE, CALCIUM CHLORIDE 600; 310; 30; 20 MG/100ML; MG/100ML; MG/100ML; MG/100ML
INJECTION, SOLUTION INTRAVENOUS CONTINUOUS
Status: DISCONTINUED | OUTPATIENT
Start: 2022-12-30 | End: 2022-12-31

## 2022-12-30 RX ORDER — MAGNESIUM SULFATE IN WATER 40 MG/ML
2000 INJECTION, SOLUTION INTRAVENOUS PRN
Status: DISCONTINUED | OUTPATIENT
Start: 2022-12-30 | End: 2023-01-01 | Stop reason: HOSPADM

## 2022-12-30 RX ORDER — IPRATROPIUM BROMIDE AND ALBUTEROL SULFATE 2.5; .5 MG/3ML; MG/3ML
1 SOLUTION RESPIRATORY (INHALATION)
Status: DISCONTINUED | OUTPATIENT
Start: 2022-12-30 | End: 2022-12-30

## 2022-12-30 RX ORDER — 0.9 % SODIUM CHLORIDE 0.9 %
30 INTRAVENOUS SOLUTION INTRAVENOUS ONCE
Status: COMPLETED | OUTPATIENT
Start: 2022-12-30 | End: 2022-12-30

## 2022-12-30 RX ORDER — IPRATROPIUM BROMIDE AND ALBUTEROL SULFATE 2.5; .5 MG/3ML; MG/3ML
1 SOLUTION RESPIRATORY (INHALATION) 2 TIMES DAILY
Status: DISCONTINUED | OUTPATIENT
Start: 2022-12-30 | End: 2022-12-30

## 2022-12-30 RX ORDER — IPRATROPIUM BROMIDE AND ALBUTEROL SULFATE 2.5; .5 MG/3ML; MG/3ML
1 SOLUTION RESPIRATORY (INHALATION) EVERY 4 HOURS PRN
Status: DISCONTINUED | OUTPATIENT
Start: 2022-12-30 | End: 2023-01-01 | Stop reason: HOSPADM

## 2022-12-30 RX ORDER — OSELTAMIVIR PHOSPHATE 75 MG/1
75 CAPSULE ORAL 2 TIMES DAILY
Status: DISCONTINUED | OUTPATIENT
Start: 2022-12-30 | End: 2023-01-01 | Stop reason: HOSPADM

## 2022-12-30 RX ADMIN — DONEPEZIL HYDROCHLORIDE 5 MG: 5 TABLET, FILM COATED ORAL at 01:18

## 2022-12-30 RX ADMIN — CEFEPIME 2000 MG: 2 INJECTION, POWDER, FOR SOLUTION INTRAVENOUS at 17:38

## 2022-12-30 RX ADMIN — ATORVASTATIN CALCIUM 80 MG: 80 TABLET, FILM COATED ORAL at 08:46

## 2022-12-30 RX ADMIN — CARVEDILOL 6.25 MG: 6.25 TABLET, FILM COATED ORAL at 17:37

## 2022-12-30 RX ADMIN — CARVEDILOL 6.25 MG: 6.25 TABLET, FILM COATED ORAL at 08:46

## 2022-12-30 RX ADMIN — SODIUM CHLORIDE 2490 ML: 9 INJECTION, SOLUTION INTRAVENOUS at 08:06

## 2022-12-30 RX ADMIN — TRAZODONE HYDROCHLORIDE 100 MG: 100 TABLET ORAL at 21:25

## 2022-12-30 RX ADMIN — HYDROCHLOROTHIAZIDE 25 MG: 25 TABLET ORAL at 08:46

## 2022-12-30 RX ADMIN — SODIUM CHLORIDE, PRESERVATIVE FREE 10 ML: 5 INJECTION INTRAVENOUS at 21:26

## 2022-12-30 RX ADMIN — OSELTAMIVIR PHOSPHATE 75 MG: 75 CAPSULE ORAL at 11:19

## 2022-12-30 RX ADMIN — CEFEPIME 2000 MG: 2 INJECTION, POWDER, FOR SOLUTION INTRAVENOUS at 08:11

## 2022-12-30 RX ADMIN — DONEPEZIL HYDROCHLORIDE 5 MG: 5 TABLET, FILM COATED ORAL at 21:25

## 2022-12-30 RX ADMIN — ACETAMINOPHEN 650 MG: 325 TABLET ORAL at 08:46

## 2022-12-30 RX ADMIN — APIXABAN 5 MG: 5 TABLET, FILM COATED ORAL at 11:19

## 2022-12-30 RX ADMIN — SODIUM CHLORIDE, POTASSIUM CHLORIDE, SODIUM LACTATE AND CALCIUM CHLORIDE: 600; 310; 30; 20 INJECTION, SOLUTION INTRAVENOUS at 11:21

## 2022-12-30 RX ADMIN — OSELTAMIVIR PHOSPHATE 75 MG: 75 CAPSULE ORAL at 21:25

## 2022-12-30 RX ADMIN — SODIUM CHLORIDE, POTASSIUM CHLORIDE, SODIUM LACTATE AND CALCIUM CHLORIDE: 600; 310; 30; 20 INJECTION, SOLUTION INTRAVENOUS at 21:24

## 2022-12-30 RX ADMIN — SODIUM CHLORIDE, PRESERVATIVE FREE 10 ML: 5 INJECTION INTRAVENOUS at 08:12

## 2022-12-30 RX ADMIN — PANTOPRAZOLE SODIUM 40 MG: 40 TABLET, DELAYED RELEASE ORAL at 08:46

## 2022-12-30 RX ADMIN — IPRATROPIUM BROMIDE AND ALBUTEROL SULFATE 1 AMPULE: .5; 3 SOLUTION RESPIRATORY (INHALATION) at 05:25

## 2022-12-30 RX ADMIN — IPRATROPIUM BROMIDE AND ALBUTEROL SULFATE 1 AMPULE: .5; 3 SOLUTION RESPIRATORY (INHALATION) at 10:01

## 2022-12-30 RX ADMIN — APIXABAN 5 MG: 5 TABLET, FILM COATED ORAL at 21:25

## 2022-12-30 RX ADMIN — AMLODIPINE BESYLATE 10 MG: 10 TABLET ORAL at 08:46

## 2022-12-30 RX ADMIN — TRAZODONE HYDROCHLORIDE 100 MG: 100 TABLET ORAL at 01:18

## 2022-12-30 ASSESSMENT — PAIN SCALES - GENERAL: PAINLEVEL_OUTOF10: 0

## 2022-12-30 NOTE — CARE COORDINATION
12/30/22, 3:21 PM EST    DISCHARGE PLANNING EVALUATION    Spoke with Eugenia Rossi at [facility]. She reports patient is current for RN and aid services. They will need new home health orders.

## 2022-12-30 NOTE — CARE COORDINATION
12/30/22 1411   Readmission Assessment   Number of Days since last admission? 8-30 days   Previous Disposition Home with Home Health   Who is being Interviewed Caregiver   What was the patient's/caregiver's perception as to why they think they needed to return back to the hospital? Other (Comment)  (Chest pain returned, fever)   Did you visit your Primary Care Physician after you left the hospital, before you returned this time? No   Why weren't you able to visit your PCP? Did not have an appointment   Did you see a specialist, such as Cardiac, Pulmonary, Orthopedic Physician, etc. after you left the hospital? No   Who advised the patient to return to the hospital? Caregiver   Does the patient report anything that got in the way of taking their medications? No   In our efforts to provide the best possible care to you and others like you, can you think of anything that we could have done to help you after you left the hospital the first time, so that you might not have needed to return so soon?  Other (Comment)  (Nothing, just waiting on biopsy results.)

## 2022-12-30 NOTE — PROGRESS NOTES
Assessment and Plan:        Paroxysmal afib- currently in nsr: monitor  flu:  start tamiflu   Mediastinal mass, likely ca; had recent ebus, nondiagnostic. :   hbp:  continue meds, monitor  ? Diabetes- monitor. Not on any meds at home. CC:  chest pain  HPI:  pt with hbp, cad, dementia presented to OSH with sharp chest pain and noted to be in afib/rvr; converted to nsr. Febrile on adm, pos for flu. ROS (12 point review of systems completed. Pertinent positives noted.  Otherwise ROS is negative) :   PMH:  Per HPI  SHX:  , lives with wife  FHX: diabetes, heart  Allergies: See above    Medications:     lactated ringers      sodium chloride        apixaban  5 mg Oral BID    cefepime  2,000 mg IntraVENous Q8H    oseltamivir  75 mg Oral BID    sodium chloride flush  10 mL IntraVENous 2 times per day    amLODIPine  10 mg Oral Daily    atorvastatin  80 mg Oral Daily    carvedilol  6.25 mg Oral BID WC    donepezil  5 mg Oral Nightly    [Held by provider] hydroCHLOROthiazide  25 mg Oral Daily    pantoprazole  40 mg Oral QAM AC    traZODone  100 mg Oral Nightly       Vital Signs:   BP (!) 153/95   Pulse (!) 113   Temp (!) 102.9 °F (39.4 °C) (Rectal)   Resp 22   Wt 184 lb 8 oz (83.7 kg)   SpO2 90%   BMI 26.47 kg/m²    No intake or output data in the 24 hours ending 12/30/22 1017     Physical Examination: General appearance - alert, well appearing, and in no distress  Mental status - wandering speech, unable to give adequate hx  Neck - supple, no significant adenopathy, no JVD, or carotid bruits  Chest - clear to auscultation, no wheezes, rales or rhonchi, symmetric air entry  Heart - normal rate, regular rhythm, normal S1, S2, no murmurs, rubs, clicks or gallops  Abdomen - soft, nontender, nondistended, no masses or organomegaly  Neurological - alert, no focal deficits; unable to keep on track    Musculoskeletal - no muscular tenderness noted  Extremities - no pedal edema noted  Skin - normal coloration and turgor, no rashes, no suspicious skin lesions noted    Data: (All radiographs, tracings, PFTs, and imaging are personally viewed and interpreted unless otherwise noted).    Reviewed all labs, cxr      Electronically signed by Ruma Rizo MD on 12/30/2022 at 10:17 AM

## 2022-12-30 NOTE — PLAN OF CARE
Problem: Chronic Conditions and Co-morbidities  Goal: Patient's chronic conditions and co-morbidity symptoms are monitored and maintained or improved  Outcome: Progressing  Flowsheets (Taken 12/30/2022 1652)  Care Plan - Patient's Chronic Conditions and Co-Morbidity Symptoms are Monitored and Maintained or Improved: Monitor and assess patient's chronic conditions and comorbid symptoms for stability, deterioration, or improvement     Problem: Discharge Planning  Goal: Discharge to home or other facility with appropriate resources  Outcome: Progressing  Flowsheets (Taken 12/30/2022 1652)  Discharge to home or other facility with appropriate resources: Identify barriers to discharge with patient and caregiver     Problem: Safety - Adult  Goal: Free from fall injury  Outcome: Progressing  Flowsheets (Taken 12/30/2022 1652)  Free From Fall Injury: Based on caregiver fall risk screen, instruct family/caregiver to ask for assistance with transferring infant if caregiver noted to have fall risk factors     Problem: Skin/Tissue Integrity  Goal: Absence of new skin breakdown  Description: 1. Monitor for areas of redness and/or skin breakdown  2. Assess vascular access sites hourly  3. Every 4-6 hours minimum:  Change oxygen saturation probe site  4. Every 4-6 hours:  If on nasal continuous positive airway pressure, respiratory therapy assess nares and determine need for appliance change or resting period. Outcome: Progressing  Note: Ongoing assessment & interventions provided throughout shift. Skin assessments provided. Encouraging/assisting patient to turn as needed.       Problem: Risk for Elopement  Goal: Patient will not exit the unit/facility without proper excort  Outcome: Progressing  Flowsheets (Taken 12/30/2022 1652)  Nursing Interventions for Elopement Risk: Assist with personal care needs such as toileting, eating, dressing, as needed to reduce the risk of wandering     Problem: Confusion  Goal: Confusion, delirium, dementia, or psychosis is improved or at baseline  Description: INTERVENTIONS:  1. Assess for possible contributors to thought disturbance, including medications, impaired vision or hearing, underlying metabolic abnormalities, dehydration, psychiatric diagnoses, and notify attending LIP  2. [place] high risk fall precautions, as indicated  3. Provide frequent short contacts to provide reality reorientation, refocusing and direction  4. Decrease environmental stimuli, including noise as appropriate  5. Monitor and intervene to maintain adequate nutrition, hydration, elimination, sleep and activity  6. If unable to ensure safety without constant attention obtain sitter and review sitter guidelines with assigned personnel  7. Initiate Psychosocial CNS and Spiritual Care consult, as indicated  Outcome: Progressing  Flowsheets (Taken 12/30/2022 1652)  Effect of thought disturbance (confusion, delirium, dementia, or psychosis) are managed with adequate functional status: Assess for contributors to thought disturbance, including medications, impaired vision or hearing, underlying metabolic abnormalities, dehydration, psychiatric diagnoses, notify LIP     Problem: Respiratory - Adult  Goal: Clear lung sounds  12/30/2022 1652 by Garcia Bo RN  Outcome: Progressing  Note: Lung sounds, pulse ox, and breathing monitored throughout shift. Discussed correct technique and importance of deep breathing & coughing exercises with patient. Patient able to demonstrate cough & deep breathing exercises to nurse. Care plan reviewed with patient. Patient verbalizes understanding of the care plan and contributed to goal setting.

## 2022-12-30 NOTE — CARE COORDINATION
Case Management Assessment  Initial Evaluation    Date/Time of Evaluation: 12/30/2022 2:16 PM  Assessment Completed by: Jaci Ortiz RN    If patient is discharged prior to next notation, then this note serves as note for discharge by case management. Patient Name: Katrina Ortega                   YOB: 1950  Diagnosis: Atrial fibrillation with RVR ([address] [I48.91]                   Date / Time: 12/29/2022 11:20 PM  Location: [address] [place] [address]     Patient Admission Status: Inpatient   Readmission Risk (Low < 19, Mod (19-27), High > 27): Readmission Risk Score: 19.7    Current PCP: Geneva Maxwell MD  PCP verified by CM? Yes    Chart Reviewed: Yes      History Provided by: Spouse, Medical Record  Patient Orientation: Person, Place    Patient Cognition: Dementia / Early Alzheimer's    Hospitalization in the last 30 days (Readmission):  Yes    If yes, Readmission Assessment in  Navigator will be completed. Advance Directives:      Code Status: Full Code   Patient's Primary Decision Maker is: Legal Next of Kin      Discharge Planning:    Patient lives with: Spouse/Significant Other Type of Home: House  Primary Care Giver: Spouse  Patient Support Systems include: Spouse/Significant Other, Family Members, [employer] [employer], Home Care Staff ([facility])   Current Financial resources: Medicare,  ([place])  Current community resources: ECF/Home Care ([facility])  Current services prior to admission: [facility], [facility], [address] ([facility])            Current DME: Quinton Maldonado            Type of Home Care services:  [employer], Nursing Services    ADLS  Prior functional level: Independent in ADLs/IADLs  Current functional level: Independent in ADLs/IADLs    Family can provide assistance at DC:  Yes  Would you like Case Management to discuss the discharge plan with any other family members/significant others, and if so, who? Yes (wife)  Plans to Return to Present Housing: Yes  Other Identified Issues/Barriers to RETURNING to current housing: None  Potential Assistance needed at discharge: N/A            Potential DME:    Patient expects to discharge to: [address] [place] [address] for transportation at discharge: Family    Financial    Payor: Dana Guerrero / Plan: Dana Guerrero / Product Type: *No Product type* /     Does insurance require precert for SNF: No    Potential assistance Purchasing Medications: No  Meds-to-Beds request:        Jake Lezama [address] [place] [address] [phone]  [facility] [address] [address]  Phone: [phone] Fax: [phone] [address] [address]  Phone: [phone] Fax: [phone]      Notes:    Factors facilitating achievement of predicted outcomes: Family support, Caregiver support, Cooperative, Pleasant, and Has needed Durable Medical Equipment at home    Barriers to discharge: Medical complications and Medication managment    Additional Case Management Notes: Transfer from [facility] with chest pain, Afib with RVR. WBC 25.6. IVF, Cefepime iv q8hr, DuoNeb q4hr [employer]. Tmax 102.9F. Pt was given iv Cardizem at [facility] and converted to sinus rhythm. Pt's HR has been 105-116 since arrival. Room air, sats 90-97%. IVF, Norvasc, Eliquis, Lipitor, Coreg, Cefepime iv q8hr, DuoNebs q4hr [employer]. Procedure:   12/30 CXR: Redemonstration of right suprahilar mediastinal mass, most consistent with malignancy. The remaining lungs are clear without consolidation or effusion. The Plan for Transition of Care is related to the following treatment goals of Atrial fibrillation with RVR ([address] [I48.91]    Patient Goals/Plan/Treatment Preferences: Plans home with wife and current with [facility]  for nursing and aide. Follow for needs, SW consulted.    Transportation/Food Security/Housekeeping Addressed: No issues identified.      Beryl Gosselin, RN  Case Management Department

## 2022-12-30 NOTE — H&P
Hospitalist History and Physical          Patient: Shara Gutierres  :   MRN: [id number]     Acct: [de-identified]    PCP: Vidhi Welch MD  Date of Admission: 2022  Date of Service: Pt seen/examined on 22  and Admitted to Inpatient with expected LOS greater than two midnights due to medical therapy. Assessment and Plan:    New onset Atrial fibrillation   [id number] Score of at least 3   Eliquis initiated   HR has maintained between 100- 120 since arrival   Suspect Afib 2/2 to infectious process   Telemetry monitoring   Beta blocker deferred to determine if afib initiated by infectious process    Possible Sepsis  SIRS criteria met- WBCs 25k, temp was 102 prior to arrival  HR >90 although  No source identified- UA pending  Blood cultures x2  Sepsis bolus initiated   Empirics abx- vancomycin, cefepime  MRSA and VRE screen  Covid and flu screening pending     Mediastinal mas  Recent EBUS on    Has follow up with heme/onc soon     CAD  Statin, Htn control- see below  ST depression in lateral leads noted- ?demand  Troponin negative    COPD  No PFTs in EMR   Duoneb PRN     Primary hypertension   Coreg, norvasc, hctz     thoracic aneurysm   3.5 cm on CT- unchanged from previous imaging     Alzheimer's dementia   ? Possible concomitant metabolic encephalopathy  Donepezil     HLD  Statin     =======================================================================      Chief Complaint:  chest pain -afib/tachycardia     History Of Present Illness:  Shara Gutierres is a 74 y.o. male with PMHx of CAD, COPD,  who presents to [facility] from [facility] with arrhythmia indicating new onset atrial fibrillation with RVR. Patient presented to [facility] with sharp lower sternal chest pain and found to have afib with RVR on EKG. While at [place] patient had converted to NSR.  Patient was an unreliable historian due to diagnosis alzheimer dementia and possible acute infection       Past Medical History:        Diagnosis Date    Arthritis     Asthma     Blood circulation, collateral     bilateral LE    CAD (coronary artery disease)     COPD (chronic obstructive pulmonary disease) (HCC)     Diabetes mellitus (HCC)     in the past    GERD (gastroesophageal reflux disease)     Hyperlipidemia     Hypertension     Other disorders of kidney and ureter in diseases classified elsewhere     Psychiatric problem        Past Surgical History:        Procedure Laterality Date    BRONCHOSCOPY N/A 12/22/2022    BRONCHOSCOPY EBUS performed by Christy Coleman MD at [address] [place] [address] Left 12/26/2018    LEFT CAROTID ENDARTERECTOMY performed by Loren Barclay MD at [facility]      right eye for detached retina    TONSILLECTOMY      TOTAL HIP ARTHROPLASTY Right 7/21/2022    RIGHT TOTAL HIP REPLACEMENT performed by Betsy Bolanos MD at [facility]       Medications Prior to Admission:   Prior to Admission medications    Medication Sig Start Date End Date Taking?  Authorizing Provider   carvedilol (COREG) 12.5 MG tablet Take 6.25 mg by mouth 2 times daily (with meals) Take am of sx    Historical Provider, MD   donepezil (ARICEPT) 10 MG tablet Take 5 mg by mouth nightly 3/4/22 3/4/23  Historical Provider, MD   OMEGA 3-6-9 FATTY ACIDS PO Take 2 capsules by mouth 2 times daily Hold 5 days before sx    Historical Provider, MD   traZODone (DESYREL) 100 MG tablet Take 100 mg by mouth at bedtime 3/4/22   Historical Provider, MD   acetaminophen (TYLENOL) 325 MG tablet Take 2 tablets by mouth every 4 hours as needed for Pain 12/27/18   Hannah Gottlieb PA-C   atorvastatin (LIPITOR) 80 MG tablet Take 1 tablet by mouth daily 11/10/18   Annel Lopes MD   pantoprazole (PROTONIX) 40 MG tablet Take 1 tablet by mouth every morning (before breakfast) 11/9/18   LAURA Colindres - CNP   hydrochlorothiazide (HYDRODIURIL) 25 MG tablet Take 25 mg by mouth daily Hold am of sx    Historical Provider, MD   nitroGLYCERIN (NITROSTAT) 0.4 MG SL tablet Place 1 tablet under the tongue every 5 minutes as needed for Chest pain 5/25/16   Fabrizio Barney MD   amLODIPine (NORVASC) 10 MG tablet Take 1 tablet by mouth daily 5/25/16   Fabrizio Barney MD       Allergies:  Patient has no known allergies. Social History:    The patient currently lives at home. Tobacco use:   reports that he quit smoking about 7 years ago. He has a 15.00 pack-year smoking history. He has never used smokeless tobacco.  Alcohol use:   reports no history of alcohol use. Drug use:  reports no history of drug use. Family History:   as follows:      Problem Relation Age of Onset    Cancer Mother         bladder    Diabetes Mother     Asthma Father     Diabetes Sister     Heart Disease Brother         MI @ 52    Heart Disease Sister         MI in 45s       Review of Systems:   Pertinent positives and negatives as noted in the HPI. Otherwise complete ROS negative. Physical Exam:    BP (!) 151/77   Pulse (!) 105   Temp 99.7 °F (37.6 °C) (Oral)   Resp 18   SpO2 97%       General appearance: No apparent distress, appears stated age. Eyes:  Pupils equal, round, and reactive to light. Conjunctivae/corneas clear. HENT: Head normal in appearance. External nares normal.  Oral mucosa moist without lesions. Hearing grossly intact. Neck: Supple, with full range of motion. Trachea midline. No gross JVD appreciated. Respiratory:  Normal respiratory effort. Clear to auscultation, bilaterally without rales or wheezes or rhonchi. Cardiovascular: tachycardic rate, regular rhythm with normal S1/S2 without murmurs. No lower extremity edema. Abdomen: Soft, non-tender, non-distended with normal bowel sounds. Musculoskeletal: No joint swelling or tenderness. Normal tone. No abnormal movements. Skin: Warm and dry. No rashes or lesions.   Neurologic:  No focal sensory/motor deficits in the upper and lower extremities. Cranial nerves:  grossly non-focal 2-12. Psychiatric: Alert and oriented, impaired insight  Capillary Refill: Brisk,< 3 seconds. Peripheral Pulses: +2 palpable, equal bilaterally. Labs:     No results for input(s): WBC, HGB, HCT, PLT in the last 72 hours. No results for input(s): NA, K, CL, CO2, BUN, CREATININE, CALCIUM, PHOS in the last 72 hours. Invalid input(s): MAGNES  No results for input(s): AST, ALT, BILIDIR, BILITOT, ALKPHOS in the last 72 hours. No results for input(s): INR in the last 72 hours. No results for input(s): Clara Dross in the last 72 hours. Lab Results   Component Value Date/Time    NITRU NEGATIVE 06/16/2022 11:26 AM    WBCUA > 100 06/16/2022 11:26 AM    BACTERIA MANY 06/16/2022 11:26 AM    RBCUA 3-5 06/16/2022 11:26 AM    BLOODU NEGATIVE 06/16/2022 11:26 AM    SPECGRAV 1.015 11/06/2018 05:15 AM    GLUCOSEU NEGATIVE 06/16/2022 11:26 AM         Radiology:     No orders to display          EKG:  I have reviewed the EKG with the following interpretation: Sinus tachycardia om repeat         Diet: regular diet  DVT prophylaxis: eliquis  Code Status: Prior  Disposition: admit to 3b     Thank you Shukri Parra MD for the opportunity to be involved in this patient's care.     Electronically signed by Kiel Delgado on 12/29/2022 at 11:38 PM.

## 2022-12-30 NOTE — PROGRESS NOTES
Pharmacy Note - Extended Infusion Beta-Lactam Adjustment    Cefepime 2000mg q12h ordered for treatment of sepsis. Per [facility] Extended Infusion Beta-Lactam Policy, cefepime will be changed to 2000mg load then 2000mg IV q8h extended infusion for CrCl greater than 60 ml/min. Estimated Creatinine Clearance: 87 ml/min based on SCr 0.8    Dialysis Status, GURINDER, CKD: none    BMI: 26.26    Rationale for Adjustment: Agent demonstrates time-dependent effect on bacterial eradication. Extended-infusion dosing strategy aims to enhance microbiologic and clinical efficacy. Pharmacy will continue to monitor cultures and sensitivities (where available) and renal function and adjust dose as necessary. Please call with any questions.     Thank you,  Marry Chiu, MounaD, BCPS 12/30/2022 7:01 AM

## 2022-12-30 NOTE — RT PROTOCOL NOTE
RT Inhaler-Nebulizer Bronchodilator Protocol Note    There is a bronchodilator order in the chart from a provider indicating to follow the RT Bronchodilator Protocol and there is an Initiate RT Inhaler-Nebulizer Bronchodilator Protocol order as well (see protocol at bottom of note). CXR Findings:  XR CHEST PORTABLE    Result Date: 12/30/2022  1. Redemonstration of right suprahilar mediastinal mass, most consistent with malignancy. 2. The remaining lungs are clear without consolidation or effusion. This document has been electronically signed by: Ena Ascencio DO on 12/30/2022 02:54 AM      The findings from the last RT Protocol Assessment were as follows:   History Pulmonary Disease: Chronic pulmonary disease  Respiratory Pattern: Dyspnea on exertion or RR 21-25 bpm  Breath Sounds: Slightly diminished and/or crackles  Cough: Strong, spontaneous, non-productive  Indication for Bronchodilator Therapy: Decreased or absent breath sounds  Bronchodilator Assessment Score: 6    Aerosolized bronchodilator medication orders have been revised according to the RT Inhaler-Nebulizer Bronchodilator Protocol below. Respiratory Therapist to perform RT Therapy Protocol Assessment initially then follow the protocol. Repeat RT Therapy Protocol Assessment PRN for score 0-3 or on second treatment, BID, and PRN for scores above 3. No Indications - adjust the frequency to every 6 hours PRN wheezing or bronchospasm, if no treatments needed after 48 hours then discontinue using Per Protocol order mode. If indication present, adjust the RT bronchodilator orders based on the Bronchodilator Assessment Score as indicated below.   Use Inhaler orders unless patient has one or more of the following: on home nebulizer, not able to hold breath for 10 seconds, is not alert and oriented, cannot activate and use MDI correctly, or respiratory rate 25 breaths per minute or more, then use the equivalent nebulizer order(s) with same Frequency and PRN reasons based on the score. If a patient is on this medication at home then do not decrease Frequency below that used at home. 0-3 - enter or revise RT bronchodilator order(s) to equivalent RT Bronchodilator order with Frequency of every 4 hours PRN for wheezing or increased work of breathing using Per Protocol order mode. 4-6 - enter or revise RT Bronchodilator order(s) to two equivalent RT bronchodilator orders with one order with BID Frequency and one order with Frequency of every 4 hours PRN wheezing or increased work of breathing using Per Protocol order mode. 7-10 - enter or revise RT Bronchodilator order(s) to two equivalent RT bronchodilator orders with one order with TID Frequency and one order with Frequency of every 4 hours PRN wheezing or increased work of breathing using Per Protocol order mode. 11-13 - enter or revise RT Bronchodilator order(s) to one equivalent RT bronchodilator order with QID Frequency and an Albuterol order with Frequency of every 4 hours PRN wheezing or increased work of breathing using Per Protocol order mode. Greater than 13 - enter or revise RT Bronchodilator order(s) to one equivalent RT bronchodilator order with every 4 hours Frequency and an Albuterol order with Frequency of every 2 hours PRN wheezing or increased work of breathing using Per Protocol order mode. RT to enter RT Home Evaluation for COPD & MDI Assessment order using Per Protocol order mode.     Electronically signed by Kiana Trevizo RCP on 12/30/2022 at 10:06 AM

## 2022-12-30 NOTE — PLAN OF CARE
Problem: Respiratory - Adult  Goal: Clear lung sounds  Outcome: Progressing   Continue breathing meds to open airways. Patient mutually agreed on goals.

## 2022-12-31 LAB
ANION GAP SERPL CALCULATED.3IONS-SCNC: 15 MEQ/L (ref 8–16)
BASOPHILS # BLD: 0.3 %
BASOPHILS ABSOLUTE: 0.1 THOU/MM3 (ref 0–0.1)
BUN BLDV-MCNC: 11 MG/DL (ref 7–22)
CALCIUM SERPL-MCNC: 8.7 MG/DL (ref 8.5–10.5)
CHLORIDE BLD-SCNC: 100 MEQ/L (ref 98–111)
CO2: 20 MEQ/L (ref 23–33)
CREAT SERPL-MCNC: 0.7 MG/DL (ref 0.4–1.2)
EOSINOPHIL # BLD: 0.6 %
EOSINOPHILS ABSOLUTE: 0.1 THOU/MM3 (ref 0–0.4)
ERYTHROCYTE [DISTWIDTH] IN BLOOD BY AUTOMATED COUNT: 14.1 % (ref 11.5–14.5)
ERYTHROCYTE [DISTWIDTH] IN BLOOD BY AUTOMATED COUNT: 45.8 FL (ref 35–45)
GFR SERPL CREATININE-BSD FRML MDRD: > 60 ML/MIN/1.73M2
GLUCOSE BLD-MCNC: 100 MG/DL (ref 70–108)
HCT VFR BLD CALC: 35.5 % (ref 42–52)
HEMOGLOBIN: 10.9 GM/DL (ref 14–18)
IMMATURE GRANS (ABS): 0.13 THOU/MM3 (ref 0–0.07)
IMMATURE GRANULOCYTES: 0.7 %
LYMPHOCYTES # BLD: 6.2 %
LYMPHOCYTES ABSOLUTE: 1.2 THOU/MM3 (ref 1–4.8)
MAGNESIUM: 1.6 MG/DL (ref 1.6–2.4)
MAGNESIUM: 1.9 MG/DL (ref 1.6–2.4)
MCH RBC QN AUTO: 27.4 PG (ref 26–33)
MCHC RBC AUTO-ENTMCNC: 30.7 GM/DL (ref 32.2–35.5)
MCV RBC AUTO: 89.2 FL (ref 80–94)
MONOCYTES # BLD: 7.4 %
MONOCYTES ABSOLUTE: 1.5 THOU/MM3 (ref 0.4–1.3)
NUCLEATED RED BLOOD CELLS: 0 /100 WBC
PLATELET # BLD: 215 THOU/MM3 (ref 130–400)
PMV BLD AUTO: 12.4 FL (ref 9.4–12.4)
POTASSIUM SERPL-SCNC: 3.6 MEQ/L (ref 3.5–5.2)
RBC # BLD: 3.98 MILL/MM3 (ref 4.7–6.1)
SEG NEUTROPHILS: 84.8 %
SEGMENTED NEUTROPHILS ABSOLUTE COUNT: 16.8 THOU/MM3 (ref 1.8–7.7)
SODIUM BLD-SCNC: 135 MEQ/L (ref 135–145)
WBC # BLD: 19.8 THOU/MM3 (ref 4.8–10.8)

## 2022-12-31 PROCEDURE — 6370000000 HC RX 637 (ALT 250 FOR IP): Performed by: PHYSICIAN ASSISTANT

## 2022-12-31 PROCEDURE — 2580000003 HC RX 258: Performed by: PHYSICIAN ASSISTANT

## 2022-12-31 PROCEDURE — 85025 COMPLETE CBC W/AUTO DIFF WBC: CPT

## 2022-12-31 PROCEDURE — 80048 BASIC METABOLIC PNL TOTAL CA: CPT

## 2022-12-31 PROCEDURE — 6360000002 HC RX W HCPCS: Performed by: INTERNAL MEDICINE

## 2022-12-31 PROCEDURE — 94640 AIRWAY INHALATION TREATMENT: CPT

## 2022-12-31 PROCEDURE — 2140000000 HC CCU INTERMEDIATE R&B

## 2022-12-31 PROCEDURE — 6370000000 HC RX 637 (ALT 250 FOR IP): Performed by: INTERNAL MEDICINE

## 2022-12-31 PROCEDURE — 6360000002 HC RX W HCPCS: Performed by: PHYSICIAN ASSISTANT

## 2022-12-31 PROCEDURE — 83735 ASSAY OF MAGNESIUM: CPT

## 2022-12-31 PROCEDURE — 36415 COLL VENOUS BLD VENIPUNCTURE: CPT

## 2022-12-31 RX ORDER — IPRATROPIUM BROMIDE AND ALBUTEROL SULFATE 2.5; .5 MG/3ML; MG/3ML
1 SOLUTION RESPIRATORY (INHALATION) 2 TIMES DAILY
Status: DISCONTINUED | OUTPATIENT
Start: 2022-12-31 | End: 2023-01-01 | Stop reason: HOSPADM

## 2022-12-31 RX ORDER — POTASSIUM CHLORIDE 20 MEQ/1
20 TABLET, EXTENDED RELEASE ORAL 2 TIMES DAILY WITH MEALS
Status: COMPLETED | OUTPATIENT
Start: 2022-12-31 | End: 2022-12-31

## 2022-12-31 RX ADMIN — SODIUM CHLORIDE, PRESERVATIVE FREE 10 ML: 5 INJECTION INTRAVENOUS at 08:49

## 2022-12-31 RX ADMIN — MAGNESIUM SULFATE HEPTAHYDRATE 2000 MG: 40 INJECTION, SOLUTION INTRAVENOUS at 02:56

## 2022-12-31 RX ADMIN — CARVEDILOL 6.25 MG: 6.25 TABLET, FILM COATED ORAL at 08:51

## 2022-12-31 RX ADMIN — CEFEPIME 2000 MG: 2 INJECTION, POWDER, FOR SOLUTION INTRAVENOUS at 08:44

## 2022-12-31 RX ADMIN — CARVEDILOL 6.25 MG: 6.25 TABLET, FILM COATED ORAL at 17:46

## 2022-12-31 RX ADMIN — APIXABAN 5 MG: 5 TABLET, FILM COATED ORAL at 08:50

## 2022-12-31 RX ADMIN — OSELTAMIVIR PHOSPHATE 75 MG: 75 CAPSULE ORAL at 08:51

## 2022-12-31 RX ADMIN — POTASSIUM CHLORIDE 20 MEQ: 1500 TABLET, EXTENDED RELEASE ORAL at 10:28

## 2022-12-31 RX ADMIN — IPRATROPIUM BROMIDE AND ALBUTEROL SULFATE 1 AMPULE: .5; 3 SOLUTION RESPIRATORY (INHALATION) at 16:00

## 2022-12-31 RX ADMIN — SODIUM CHLORIDE, PRESERVATIVE FREE 10 ML: 5 INJECTION INTRAVENOUS at 21:28

## 2022-12-31 RX ADMIN — CEFEPIME 2000 MG: 2 INJECTION, POWDER, FOR SOLUTION INTRAVENOUS at 00:31

## 2022-12-31 RX ADMIN — ACETAMINOPHEN 650 MG: 325 TABLET ORAL at 06:29

## 2022-12-31 RX ADMIN — OSELTAMIVIR PHOSPHATE 75 MG: 75 CAPSULE ORAL at 21:27

## 2022-12-31 RX ADMIN — POTASSIUM CHLORIDE 20 MEQ: 1500 TABLET, EXTENDED RELEASE ORAL at 17:46

## 2022-12-31 RX ADMIN — AMLODIPINE BESYLATE 10 MG: 10 TABLET ORAL at 08:51

## 2022-12-31 RX ADMIN — TRAZODONE HYDROCHLORIDE 100 MG: 100 TABLET ORAL at 21:27

## 2022-12-31 RX ADMIN — DONEPEZIL HYDROCHLORIDE 5 MG: 5 TABLET, FILM COATED ORAL at 21:27

## 2022-12-31 RX ADMIN — ATORVASTATIN CALCIUM 80 MG: 80 TABLET, FILM COATED ORAL at 08:50

## 2022-12-31 RX ADMIN — APIXABAN 5 MG: 5 TABLET, FILM COATED ORAL at 21:27

## 2022-12-31 RX ADMIN — PANTOPRAZOLE SODIUM 40 MG: 40 TABLET, DELAYED RELEASE ORAL at 06:30

## 2022-12-31 NOTE — PROGRESS NOTES
Assessment and Plan:        Paroxysmal afib- currently in nsr: monitor; replace k. Recent echo unremarkable; prior cath some cad. Trops negative  flu:  started tamiflu. Stop antibiotics   Mediastinal mass,  ca; had recent ebus, squamous cell ca; will try to discuss with family   hbp:  continue meds, monitor  ? Diabetes- monitor. Not on any meds at home. Sugars not high enough to treat      CC:  chest pain  HPI:  pt with hbp, cad, dementia presented to OSH with sharp chest pain and noted to be in afib/rvr; converted to nsr. Febrile on adm, pos for flu. ROS (12 point review of systems completed. Pertinent positives noted.  Otherwise ROS is negative) :   PMH:  Per HPI  SHX:  , lives with wife  FHX: diabetes, heart  Allergies: See above    Medications:     sodium chloride        potassium chloride  20 mEq Oral BID WC    apixaban  5 mg Oral BID    oseltamivir  75 mg Oral BID    sodium chloride flush  10 mL IntraVENous 2 times per day    amLODIPine  10 mg Oral Daily    atorvastatin  80 mg Oral Daily    carvedilol  6.25 mg Oral BID WC    donepezil  5 mg Oral Nightly    [Held by provider] hydroCHLOROthiazide  25 mg Oral Daily    pantoprazole  40 mg Oral QAM AC    traZODone  100 mg Oral Nightly       Vital Signs:   /60   Pulse 94   Temp 98.9 °F (37.2 °C) (Oral)   Resp 16   Wt 184 lb 8 oz (83.7 kg)   SpO2 93%   BMI 26.47 kg/m²    No intake or output data in the 24 hours ending 12/31/22 0914     Physical Examination: General appearance - alert, well appearing, and in no distress  Mental status - wandering speech, unable to give adequate hx  Neck - supple, no significant adenopathy, no JVD, or carotid bruits  Chest - clear to auscultation, no wheezes, rales or rhonchi, symmetric air entry  Heart - normal rate, regular rhythm, normal S1, S2, no murmurs, rubs, clicks or gallops  Abdomen - soft, nontender, nondistended, no masses or organomegaly  Neurological - alert, no focal deficits; unable to keep on track    Musculoskeletal - no muscular tenderness noted  Extremities - no pedal edema noted  Skin - normal coloration and turgor, no rashes, no suspicious skin lesions noted    Data: (All radiographs, tracings, PFTs, and imaging are personally viewed and interpreted unless otherwise noted).    Reviewed all labs, cxr      Electronically signed by Nadiya Stauffer MD on 12/31/2022 at 9:14 AM

## 2022-12-31 NOTE — RT PROTOCOL NOTE
RT Inhaler-Nebulizer Bronchodilator Protocol Note    There is a bronchodilator order in the chart from a provider indicating to follow the RT Bronchodilator Protocol and there is an Initiate RT Inhaler-Nebulizer Bronchodilator Protocol order as well (see protocol at bottom of note). CXR Findings:  XR CHEST PORTABLE    Result Date: 12/30/2022  1. Redemonstration of right suprahilar mediastinal mass, most consistent with malignancy. 2. The remaining lungs are clear without consolidation or effusion. This document has been electronically signed by: Rome Norman DO on 12/30/2022 02:54 AM      The findings from the last RT Protocol Assessment were as follows:   History Pulmonary Disease: Chronic pulmonary disease  Respiratory Pattern: Dyspnea on exertion or RR 21-25 bpm  Breath Sounds: Slightly diminished and/or crackles  Cough: Strong, spontaneous, non-productive  Indication for Bronchodilator Therapy: Decreased or absent breath sounds  Bronchodilator Assessment Score: 6    Aerosolized bronchodilator medication orders have been revised according to the RT Inhaler-Nebulizer Bronchodilator Protocol below. Respiratory Therapist to perform RT Therapy Protocol Assessment initially then follow the protocol. Repeat RT Therapy Protocol Assessment PRN for score 0-3 or on second treatment, BID, and PRN for scores above 3. No Indications - adjust the frequency to every 6 hours PRN wheezing or bronchospasm, if no treatments needed after 48 hours then discontinue using Per Protocol order mode. If indication present, adjust the RT bronchodilator orders based on the Bronchodilator Assessment Score as indicated below.   Use Inhaler orders unless patient has one or more of the following: on home nebulizer, not able to hold breath for 10 seconds, is not alert and oriented, cannot activate and use MDI correctly, or respiratory rate 25 breaths per minute or more, then use the equivalent nebulizer order(s) with same Frequency and PRN reasons based on the score. If a patient is on this medication at home then do not decrease Frequency below that used at home. 0-3 - enter or revise RT bronchodilator order(s) to equivalent RT Bronchodilator order with Frequency of every 4 hours PRN for wheezing or increased work of breathing using Per Protocol order mode. 4-6 - enter or revise RT Bronchodilator order(s) to two equivalent RT bronchodilator orders with one order with BID Frequency and one order with Frequency of every 4 hours PRN wheezing or increased work of breathing using Per Protocol order mode. 7-10 - enter or revise RT Bronchodilator order(s) to two equivalent RT bronchodilator orders with one order with TID Frequency and one order with Frequency of every 4 hours PRN wheezing or increased work of breathing using Per Protocol order mode. 11-13 - enter or revise RT Bronchodilator order(s) to one equivalent RT bronchodilator order with QID Frequency and an Albuterol order with Frequency of every 4 hours PRN wheezing or increased work of breathing using Per Protocol order mode. Greater than 13 - enter or revise RT Bronchodilator order(s) to one equivalent RT bronchodilator order with every 4 hours Frequency and an Albuterol order with Frequency of every 2 hours PRN wheezing or increased work of breathing using Per Protocol order mode. RT to enter RT Home Evaluation for COPD & MDI Assessment order using Per Protocol order mode.     Electronically signed by Dick Dance, RCP on 12/31/2022 at 4:03 PM

## 2022-12-31 NOTE — PLAN OF CARE
Problem: Chronic Conditions and Co-morbidities  Goal: Patient's chronic conditions and co-morbidity symptoms are monitored and maintained or improved  Outcome: Progressing  Flowsheets (Taken 12/31/2022 0756)  Care Plan - Patient's Chronic Conditions and Co-Morbidity Symptoms are Monitored and Maintained or Improved:   Monitor and assess patient's chronic conditions and comorbid symptoms for stability, deterioration, or improvement   Collaborate with multidisciplinary team to address chronic and comorbid conditions and prevent exacerbation or deterioration   Update acute care plan with appropriate goals if chronic or comorbid symptoms are exacerbated and prevent overall improvement and discharge     Problem: Discharge Planning  Goal: Discharge to home or other facility with appropriate resources  Outcome: Progressing  Flowsheets (Taken 12/31/2022 0756)  Discharge to home or other facility with appropriate resources:   Identify barriers to discharge with patient and caregiver   Identify discharge learning needs (meds, wound care, etc)   Refer to discharge planning if patient needs post-hospital services based on physician order or complex needs related to functional status, cognitive ability or social support system     Problem: Safety - Adult  Goal: Free from fall injury  Outcome: Progressing  Flowsheets (Taken 12/30/2022 1652 by Tl Cruz RN)  Free From Fall Injury: Based on caregiver fall risk screen, instruct family/caregiver to ask for assistance with transferring infant if caregiver noted to have fall risk factors     Problem: Skin/Tissue Integrity  Goal: Absence of new skin breakdown  Description: 1. Monitor for areas of redness and/or skin breakdown  2. Assess vascular access sites hourly  3. Every 4-6 hours minimum:  Change oxygen saturation probe site  4.   Every 4-6 hours:  If on nasal continuous positive airway pressure, respiratory therapy assess nares and determine need for appliance change or resting period. Outcome: Progressing  Note: No skin breakdown noted     Problem: Risk for Elopement  Goal: Patient will not exit the unit/facility without proper excort  Outcome: Progressing  Flowsheets (Taken 12/31/2022 0756)  Nursing Interventions for Elopement Risk:   Assist with personal care needs such as toileting, eating, dressing, as needed to reduce the risk of wandering   Make sure patient has all necessary personal care items     Problem: Confusion  Goal: Confusion, delirium, dementia, or psychosis is improved or at baseline  Description: INTERVENTIONS:  1. Assess for possible contributors to thought disturbance, including medications, impaired vision or hearing, underlying metabolic abnormalities, dehydration, psychiatric diagnoses, and notify attending LIP  2. Chambers high risk fall precautions, as indicated  3. Provide frequent short contacts to provide reality reorientation, refocusing and direction  4. Decrease environmental stimuli, including noise as appropriate  5. Monitor and intervene to maintain adequate nutrition, hydration, elimination, sleep and activity  6. If unable to ensure safety without constant attention obtain sitter and review sitter guidelines with assigned personnel  7.  Initiate Psychosocial CNS and Spiritual Care consult, as indicated  Flowsheets (Taken 12/31/2022 0756)  Effect of thought disturbance (confusion, delirium, dementia, or psychosis) are managed with adequate functional status:   Assess for contributors to thought disturbance, including medications, impaired vision or hearing, underlying metabolic abnormalities, dehydration, psychiatric diagnoses, notify Jay Borjas high risk fall precautions, as indicated   Provide frequent short contacts to provide reality reorientation, refocusing and direction   Decrease environmental stimuli, including noise as appropriate   Monitor and intervene to maintain adequate nutrition, hydration, elimination, sleep and activity     Problem: Respiratory - Adult  Goal: Clear lung sounds  12/31/2022 1848 by Rimma Mccarty RN  Outcome: Progressing  Note: I and E wheezes at times. Frequent, nonproductive cough noted  12/31/2022 1605 by Mj Merrill RCP  Outcome: Progressing  Note: Txs to help improve lung aeration. Patient mutually agreed on goals. Care plan reviewed with patient. Patient verbalize understanding of the plan of care and contribute to goal setting.

## 2023-01-01 VITALS
RESPIRATION RATE: 18 BRPM | HEART RATE: 68 BPM | TEMPERATURE: 98.6 F | WEIGHT: 184.5 LBS | OXYGEN SATURATION: 95 % | SYSTOLIC BLOOD PRESSURE: 110 MMHG | BODY MASS INDEX: 26.47 KG/M2 | DIASTOLIC BLOOD PRESSURE: 56 MMHG

## 2023-01-01 PROBLEM — C34.91 SQUAMOUS CELL CARCINOMA OF RIGHT LUNG (HCC): Status: ACTIVE | Noted: 2023-01-01

## 2023-01-01 LAB — POTASSIUM SERPL-SCNC: 3.8 MEQ/L (ref 3.5–5.2)

## 2023-01-01 PROCEDURE — 6370000000 HC RX 637 (ALT 250 FOR IP): Performed by: INTERNAL MEDICINE

## 2023-01-01 PROCEDURE — 2580000003 HC RX 258: Performed by: PHYSICIAN ASSISTANT

## 2023-01-01 PROCEDURE — 94640 AIRWAY INHALATION TREATMENT: CPT

## 2023-01-01 PROCEDURE — 6370000000 HC RX 637 (ALT 250 FOR IP): Performed by: PHYSICIAN ASSISTANT

## 2023-01-01 PROCEDURE — 36415 COLL VENOUS BLD VENIPUNCTURE: CPT

## 2023-01-01 PROCEDURE — 84132 ASSAY OF SERUM POTASSIUM: CPT

## 2023-01-01 RX ORDER — OSELTAMIVIR PHOSPHATE 75 MG/1
75 CAPSULE ORAL 2 TIMES DAILY
Qty: 5 CAPSULE | Refills: 0 | Status: SHIPPED | OUTPATIENT
Start: 2023-01-01 | End: 2023-01-04

## 2023-01-01 RX ORDER — METOPROLOL TARTRATE 50 MG/1
50 TABLET, FILM COATED ORAL 2 TIMES DAILY
Status: DISCONTINUED | OUTPATIENT
Start: 2023-01-01 | End: 2023-01-01 | Stop reason: HOSPADM

## 2023-01-01 RX ORDER — METOPROLOL TARTRATE 50 MG/1
50 TABLET, FILM COATED ORAL 2 TIMES DAILY
Qty: 60 TABLET | Refills: 3 | Status: SHIPPED | OUTPATIENT
Start: 2023-01-01

## 2023-01-01 RX ADMIN — CARVEDILOL 6.25 MG: 6.25 TABLET, FILM COATED ORAL at 08:08

## 2023-01-01 RX ADMIN — ATORVASTATIN CALCIUM 80 MG: 80 TABLET, FILM COATED ORAL at 08:08

## 2023-01-01 RX ADMIN — SODIUM CHLORIDE, PRESERVATIVE FREE 10 ML: 5 INJECTION INTRAVENOUS at 08:08

## 2023-01-01 RX ADMIN — IPRATROPIUM BROMIDE AND ALBUTEROL SULFATE 1 AMPULE: .5; 3 SOLUTION RESPIRATORY (INHALATION) at 09:46

## 2023-01-01 RX ADMIN — AMLODIPINE BESYLATE 10 MG: 10 TABLET ORAL at 08:08

## 2023-01-01 RX ADMIN — IPRATROPIUM BROMIDE AND ALBUTEROL SULFATE 1 AMPULE: .5; 3 SOLUTION RESPIRATORY (INHALATION) at 01:40

## 2023-01-01 RX ADMIN — PANTOPRAZOLE SODIUM 40 MG: 40 TABLET, DELAYED RELEASE ORAL at 05:22

## 2023-01-01 RX ADMIN — APIXABAN 5 MG: 5 TABLET, FILM COATED ORAL at 08:08

## 2023-01-01 RX ADMIN — OSELTAMIVIR PHOSPHATE 75 MG: 75 CAPSULE ORAL at 08:08

## 2023-01-01 NOTE — PLAN OF CARE
Problem: Chronic Conditions and Co-morbidities  Goal: Patient's chronic conditions and co-morbidity symptoms are monitored and maintained or improved  1/1/2023 1314 by Shana Wade RN  Outcome: Completed  1/1/2023 1128 by Shana Wade RN  Outcome: Progressing  Flowsheets (Taken 1/1/2023 1128)  Care Plan - Patient's Chronic Conditions and Co-Morbidity Symptoms are Monitored and Maintained or Improved: Monitor and assess patient's chronic conditions and comorbid symptoms for stability, deterioration, or improvement     Problem: Discharge Planning  Goal: Discharge to home or other facility with appropriate resources  1/1/2023 1314 by Shana Wade RN  Outcome: Completed  1/1/2023 1128 by Shana Wade RN  Outcome: Progressing  Flowsheets (Taken 1/1/2023 1128)  Discharge to home or other facility with appropriate resources: Arrange for needed discharge resources and transportation as appropriate     Problem: Safety - Adult  Goal: Free from fall injury  1/1/2023 1314 by Shana Wade RN  Outcome: Completed  1/1/2023 1128 by Shana Wade RN  Outcome: Progressing  Flowsheets (Taken 1/1/2023 1128)  Free From Fall Injury: Based on caregiver fall risk screen, instruct family/caregiver to ask for assistance with transferring infant if caregiver noted to have fall risk factors     Problem: Skin/Tissue Integrity  Goal: Absence of new skin breakdown  Description: 1. Monitor for areas of redness and/or skin breakdown  2. Assess vascular access sites hourly  3. Every 4-6 hours minimum:  Change oxygen saturation probe site  4. Every 4-6 hours:  If on nasal continuous positive airway pressure, respiratory therapy assess nares and determine need for appliance change or resting period. 1/1/2023 1314 by Shana Wade RN  Outcome: Completed  1/1/2023 1128 by Shana Wade RN  Outcome: Progressing  Note: No signs of new skin breakdown with each assessment. Skin remains warm, dry, intact.  Mucous membranes pink & moist. Patient is able to turn self.       Problem: Risk for Elopement  Goal: Patient will not exit the unit/facility without proper excort  1/1/2023 1314 by Olya Anderson RN  Outcome: Completed  1/1/2023 1128 by Olya Anderson RN  Outcome: Progressing     Problem: Confusion  Goal: Confusion, delirium, dementia, or psychosis is improved or at baseline  Description: INTERVENTIONS:  1. Assess for possible contributors to thought disturbance, including medications, impaired vision or hearing, underlying metabolic abnormalities, dehydration, psychiatric diagnoses, and notify attending LIP  2. [place] high risk fall precautions, as indicated  3. Provide frequent short contacts to provide reality reorientation, refocusing and direction  4. Decrease environmental stimuli, including noise as appropriate  5. Monitor and intervene to maintain adequate nutrition, hydration, elimination, sleep and activity  6. If unable to ensure safety without constant attention obtain sitter and review sitter guidelines with assigned personnel  7. Initiate Psychosocial CNS and Spiritual Care consult, as indicated  1/1/2023 1314 by Olya Anderson RN  Outcome: Completed  1/1/2023 1128 by Olya Anderson RN  Outcome: Progressing     Problem: Respiratory - Adult  Goal: Clear lung sounds  1/1/2023 1314 by Olya Anderson RN  Outcome: Completed  1/1/2023 1128 by Olya Anderson RN  Outcome: Progressing  1/1/2023 0145 by Cruz Iqbal RCP  Outcome: Progressing     Problem: Infection - Adult  Goal: Absence of infection at discharge  1/1/2023 1314 by Olya Anderson RN  Outcome: Completed  1/1/2023 1135 by Olya Anderson RN  Outcome: Progressing  Flowsheets (Taken 1/1/2023 1135)  Absence of infection at discharge: Assess and monitor for signs and symptoms of infection  Note: Droplet isolation.    Goal: Absence of infection during hospitalization  1/1/2023 1314 by Olya Anderson RN  Outcome: Completed  1/1/2023 1135 by Olya Anderson RN  Outcome: Progressing  Goal: Absence of fever/infection during anticipated neutropenic period  1/1/2023 1314 by Cristhian Barry RN  Outcome: Completed  1/1/2023 1135 by Cristhian Barry RN  Outcome: Progressing   Care plan reviewed with patient. Patient verbalizes understanding of the plan of care and contributed to goal setting.

## 2023-01-01 NOTE — PLAN OF CARE
Problem: Respiratory - Adult  Goal: Clear lung sounds  1/1/2023 0145 by Gian Earl RCP  Outcome: Progressing    Patient lung sounds are considered normal for their current lung condition. No signs of distress noted. Current treatment regimen appropriate       Patient mutually agreed on goals.

## 2023-01-01 NOTE — DISCHARGE SUMMARY
Discharge Summary    Patient:  Claudell Grumbling  YOB: 1950    MRN: [id number]   Acct: [de-identified]    Primary Care Physician: Cheryl Gonzalez MD    Admit date:  12/29/2022    Discharge date:  1/1/2023       Discharge Diagnoses:   Atrial fibrillation with RVR ([address]  Principal Problem:    Atrial fibrillation with RVR ([address]  Active Problems:    Essential hypertension    Influenza A    Squamous cell carcinoma of right lung ([address]    Chronic obstructive pulmonary disease ([address]  Resolved Problems:    * No resolved hospital problems. *        Admitted for: (HPI) atrial fibrillation    Hospital Course: This is a nice elderly gentleman with hx hbp and dementia who presented to ER with sharp lower chest pain and was found to be in afib with rvr. He was then transferred here. He converted to nsr. He was noted to be febrile on admission with a temp of 102. He was positive for flu and started on Tamiflu. He had no further arrhythmia and it was felt it was provoked by the flu. Therefore he was not anticoagulated but an event recorder through his primary would be worthwhile. A biopsy report from an outpt ebus returned showing squamous ca lung. This was not discussed with pt or family as they have an appointment with Pulmonary the following week. He rather quickly recovered from the flu and was discharged to the care of his family.        Discharge Medications:       Medication List        START taking these medications      metoprolol tartrate 50 MG tablet  Commonly known as: LOPRESSOR  Take 1 tablet by mouth 2 times daily     oseltamivir 75 MG capsule  Commonly known as: TAMIFLU  Take 1 capsule by mouth 2 times daily for 5 doses            CONTINUE taking these medications      acetaminophen 325 MG tablet  Commonly known as: TYLENOL  Take 2 tablets by mouth every 4 hours as needed for Pain     amLODIPine 10 MG tablet  Commonly known as: NORVASC  Take 1 tablet by mouth daily     atorvastatin 80 MG tablet  Commonly known as: LIPITOR  Take 1 tablet by mouth daily     donepezil 10 MG tablet  Commonly known as: ARICEPT     nitroGLYCERIN 0.4 MG SL tablet  Commonly known as: NITROSTAT  Place 1 tablet under the tongue every 5 minutes as needed for Chest pain     pantoprazole 40 MG tablet  Commonly known as: PROTONIX  Take 1 tablet by mouth every morning (before breakfast)     traZODone 100 MG tablet  Commonly known as: DESYREL            STOP taking these medications      carvedilol 12.5 MG tablet  Commonly known as: COREG     hydroCHLOROthiazide 25 MG tablet  Commonly known as: HYDRODIURIL     OMEGA 3-6-9 FATTY ACIDS PO               Where to Get Your Medications        These medications were sent to [place] [address]      Phone: [phone]   metoprolol tartrate 50 MG tablet  oseltamivir 75 MG capsule           Physical Exam:    Vitals:  Vitals:    01/01/23 0140 01/01/23 0521 01/01/23 0800 01/01/23 1221   BP:  (!) 116/56 (!) 120/57 (!) 110/56   Pulse: 77 73 72 68   Resp: 16 16 18 18   Temp:  100 °F (37.8 °C) 99.2 °F (37.3 °C) 98.6 °F (37 °C)   TempSrc:  Oral Oral Oral   SpO2: 93% 93% 94% 95%   Weight:         Weight: Weight: 184 lb 8 oz (83.7 kg)     24 hour intake/output:  Intake/Output Summary (Last 24 hours) at 1/1/2023 1440  Last data filed at 1/1/2023 1338  Gross per 24 hour   Intake 645.18 ml   Output --   Net 645.18 ml       General appearance - alert, well appearing, and in no distress  Chest - clear to auscultation, no wheezes, rales or rhonchi, symmetric air entry  Heart - normal rate, regular rhythm, normal S1, S2, no murmurs, rubs, clicks or gallops  Abdomen - soft, nontender, nondistended, no masses or organomegaly  Obese: No; Protuberant: No   Neurological - alert, no focal deficits  Extremities - peripheral pulses normal, no pedal edema, no clubbing or cyanosis  Skin - normal coloration and turgor, no rashes, no suspicious skin lesions noted        Labs can be found in the Lab tab of Chart Review    Diet:  ADULT DIET;  Regular    Activity:  Activity as tolerated (Patient may move about with assist as indicated or with supervision.)    Follow-up:  in the next few days with Cielo Carpio MD,  in the next few days with Dr Jaylyn Casas    Disposition: home    Condition: Stable      Time Spent: 35 minutes    Electronically signed by Josesito Herbert MD on 1/1/2023 at 2:40 PM    Discharging Hospitalist

## 2023-01-01 NOTE — PROGRESS NOTES
Discharge teaching and instructions for diagnosis/procedure of Atrial fibrillation with RVR completed with patient using teachback method. AVS reviewed. Printed prescriptions given to patient. Patient voiced understanding regarding prescriptions, follow up appointments, and care of self at home. Discharged in a wheelchair to  home with support per family.

## 2023-01-01 NOTE — PROGRESS NOTES
Assessment and Plan:        Paroxysmal afib- currently in nsr: monitor; replace k. Recent echo unremarkable; prior cath some cad. Trops negative. No recurrence. Not eager to anticoagulate. Suspect arrhythmia triggered by flu.   flu:  started tamiflu. Stop antibiotics   Mediastinal mass,  ca; had recent ebus, squamous cell ca; has appt with Pulmonary 1.5 to discuss  hbp:  continue meds, monitor  ? Diabetes- monitor. Not on any meds at home. Sugars not high enough to treat      CC:  chest pain  HPI:  pt with hbp, cad, dementia presented to OSH with sharp chest pain and noted to be in afib/rvr; converted to nsr. Febrile on adm, pos for flu. ROS (12 point review of systems completed. Pertinent positives noted.  Otherwise ROS is negative) :   PMH:  Per HPI  SHX:  , lives with wife  FHX: diabetes, heart  Allergies: See above    Medications:     sodium chloride        metoprolol tartrate  50 mg Oral BID    ipratropium-albuterol  1 ampule Inhalation BID    apixaban  5 mg Oral BID    oseltamivir  75 mg Oral BID    sodium chloride flush  10 mL IntraVENous 2 times per day    amLODIPine  10 mg Oral Daily    atorvastatin  80 mg Oral Daily    donepezil  5 mg Oral Nightly    [Held by provider] hydroCHLOROthiazide  25 mg Oral Daily    pantoprazole  40 mg Oral QAM AC    traZODone  100 mg Oral Nightly       Vital Signs:   BP (!) 120/57   Pulse 72   Temp 99.2 °F (37.3 °C) (Oral)   Resp 18   Wt 184 lb 8 oz (83.7 kg)   SpO2 94%   BMI 26.47 kg/m²      Intake/Output Summary (Last 24 hours) at 1/1/2023 0920  Last data filed at 12/31/2022 1713  Gross per 24 hour   Intake 145.18 ml   Output --   Net 145.18 ml        Physical Examination: General appearance - alert, well appearing, and in no distress  Mental status - wandering speech, unable to give adequate hx  Neck - supple, no significant adenopathy, no JVD, or carotid bruits  Chest - clear to auscultation, no wheezes, rales or rhonchi, symmetric air entry  Heart - normal rate, regular rhythm, normal S1, S2, no murmurs, rubs, clicks or gallops  Abdomen - soft, nontender, nondistended, no masses or organomegaly  Neurological - alert, no focal deficits; unable to keep on track    Musculoskeletal - no muscular tenderness noted  Extremities - no pedal edema noted  Skin - normal coloration and turgor, no rashes, no suspicious skin lesions noted    Data: (All radiographs, tracings, PFTs, and imaging are personally viewed and interpreted unless otherwise noted).    Reviewed all labs, cxr      Electronically signed by Samantha Escoto MD on 1/1/2023 at 9:20 AM

## 2023-01-01 NOTE — PLAN OF CARE
Problem: Chronic Conditions and Co-morbidities  Goal: Patient's chronic conditions and co-morbidity symptoms are monitored and maintained or improved  Outcome: Progressing  Flowsheets (Taken 1/1/2023 1128)  Care Plan - Patient's Chronic Conditions and Co-Morbidity Symptoms are Monitored and Maintained or Improved: Monitor and assess patient's chronic conditions and comorbid symptoms for stability, deterioration, or improvement     Problem: Discharge Planning  Goal: Discharge to home or other facility with appropriate resources  Outcome: Progressing  Flowsheets (Taken 1/1/2023 1128)  Discharge to home or other facility with appropriate resources: Arrange for needed discharge resources and transportation as appropriate     Problem: Safety - Adult  Goal: Free from fall injury  Outcome: Progressing  Flowsheets (Taken 1/1/2023 1128)  Free From Fall Injury: Based on caregiver fall risk screen, instruct family/caregiver to ask for assistance with transferring infant if caregiver noted to have fall risk factors     Problem: Skin/Tissue Integrity  Goal: Absence of new skin breakdown  Description: 1. Monitor for areas of redness and/or skin breakdown  2. Assess vascular access sites hourly  3. Every 4-6 hours minimum:  Change oxygen saturation probe site  4. Every 4-6 hours:  If on nasal continuous positive airway pressure, respiratory therapy assess nares and determine need for appliance change or resting period. Outcome: Progressing  Note: No signs of new skin breakdown with each assessment. Skin remains warm, dry, intact. Mucous membranes pink & moist. Patient is able to turn self. Problem: Risk for Elopement  Goal: Patient will not exit the unit/facility without proper excort  Outcome: Progressing     Problem: Confusion  Goal: Confusion, delirium, dementia, or psychosis is improved or at baseline  Description: INTERVENTIONS:  1.  Assess for possible contributors to thought disturbance, including medications, impaired vision or hearing, underlying metabolic abnormalities, dehydration, psychiatric diagnoses, and notify attending LIP  2. [place] high risk fall precautions, as indicated  3. Provide frequent short contacts to provide reality reorientation, refocusing and direction  4. Decrease environmental stimuli, including noise as appropriate  5. Monitor and intervene to maintain adequate nutrition, hydration, elimination, sleep and activity  6. If unable to ensure safety without constant attention obtain sitter and review sitter guidelines with assigned personnel  7. Initiate Psychosocial CNS and Spiritual Care consult, as indicated  Outcome: Progressing     Problem: Respiratory - Adult  Goal: Clear lung sounds  1/1/2023 1128 by Kristal Jorge RN  Outcome: Progressing  Care plan reviewed with patient. Patient verbalizes understanding of the plan of care and contributed to goal setting.

## 2023-01-01 NOTE — PLAN OF CARE
Problem: Infection - Adult  Goal: Absence of infection at discharge  Outcome: Progressing  Flowsheets (Taken 1/1/2023 1135)  Absence of infection at discharge: Assess and monitor for signs and symptoms of infection  Note: Droplet isolation.

## 2023-01-04 LAB
BLOOD CULTURE, ROUTINE: NORMAL
BLOOD CULTURE, ROUTINE: NORMAL

## 2023-01-05 ENCOUNTER — TELEPHONE (OUTPATIENT)
Dept: PULMONOLOGY | Age: 73
End: 2023-01-05

## 2023-01-05 ENCOUNTER — OFFICE VISIT (OUTPATIENT)
Dept: PULMONOLOGY | Age: 73
End: 2023-01-05
Payer: OTHER GOVERNMENT

## 2023-01-05 VITALS
OXYGEN SATURATION: 99 % | WEIGHT: 178.4 LBS | TEMPERATURE: 97.3 F | BODY MASS INDEX: 25.54 KG/M2 | HEIGHT: 70 IN | SYSTOLIC BLOOD PRESSURE: 116 MMHG | HEART RATE: 90 BPM | DIASTOLIC BLOOD PRESSURE: 60 MMHG

## 2023-01-05 DIAGNOSIS — J44.9 CHRONIC OBSTRUCTIVE PULMONARY DISEASE, UNSPECIFIED COPD TYPE (HCC): ICD-10-CM

## 2023-01-05 DIAGNOSIS — R05.9 COUGH, UNSPECIFIED TYPE: ICD-10-CM

## 2023-01-05 DIAGNOSIS — C34.91 SQUAMOUS CELL CARCINOMA OF RIGHT LUNG (HCC): ICD-10-CM

## 2023-01-05 DIAGNOSIS — J98.59 MEDIASTINAL MASS: Primary | ICD-10-CM

## 2023-01-05 DIAGNOSIS — R06.02 SOB (SHORTNESS OF BREATH) ON EXERTION: ICD-10-CM

## 2023-01-05 PROCEDURE — 3078F DIAST BP <80 MM HG: CPT | Performed by: NURSE PRACTITIONER

## 2023-01-05 PROCEDURE — 94618 PULMONARY STRESS TESTING: CPT | Performed by: NURSE PRACTITIONER

## 2023-01-05 PROCEDURE — 1123F ACP DISCUSS/DSCN MKR DOCD: CPT | Performed by: NURSE PRACTITIONER

## 2023-01-05 PROCEDURE — 3074F SYST BP LT 130 MM HG: CPT | Performed by: NURSE PRACTITIONER

## 2023-01-05 PROCEDURE — 99214 OFFICE O/P EST MOD 30 MIN: CPT | Performed by: NURSE PRACTITIONER

## 2023-01-05 RX ORDER — BENZONATATE 100 MG/1
100-200 CAPSULE ORAL 3 TIMES DAILY PRN
Qty: 60 CAPSULE | Refills: 0 | Status: SHIPPED | OUTPATIENT
Start: 2023-01-05 | End: 2023-01-15

## 2023-01-05 RX ORDER — ALBUTEROL SULFATE 90 UG/1
2 AEROSOL, METERED RESPIRATORY (INHALATION) EVERY 4 HOURS PRN
Qty: 1 EACH | Refills: 3 | Status: SHIPPED | OUTPATIENT
Start: 2023-01-05

## 2023-01-05 RX ORDER — OSELTAMIVIR PHOSPHATE 75 MG/1
75 CAPSULE ORAL 2 TIMES DAILY
COMMUNITY

## 2023-01-05 ASSESSMENT — ENCOUNTER SYMPTOMS
GASTROINTESTINAL NEGATIVE: 1
VOMITING: 0
EYES NEGATIVE: 1
WHEEZING: 0
DIARRHEA: 0
STRIDOR: 0
CHEST TIGHTNESS: 0
ALLERGIC/IMMUNOLOGIC NEGATIVE: 1
RESPIRATORY NEGATIVE: 1
NAUSEA: 0

## 2023-01-05 NOTE — PROGRESS NOTES
[facility]    Patient: Elvia Cornejo, 74 y.o.   : 1950    Pt of Dr. Jacob Lynn   Patient presents with    Follow-up     Ebus results        HPI  Arminola Sender is here for follow up for EBUS results. Patient was already told of results per Dr. Gifford Human- following with him for treatment  PFT never done   Former smoker   No current inhaler use   No home oxygen use   C/o SOB cough and some nausea at times   SOB bad with exertion per patient and wife     Progress History:   Since last visit any new medical issues? No  New ER or hospital visits? Yes In December when EBUS was performed   Any new or changes in medicines? No  Using inhalers? No  Are they helpful? No  Flu vaccine? Pneumonia vaccine?   Covid vaccine- done x 2     Past Medical hx   PMH:  Past Medical History:   Diagnosis Date    Arthritis     Asthma     Blood circulation, collateral     bilateral LE    CAD (coronary artery disease)     COPD (chronic obstructive pulmonary disease) (HCC)     Diabetes mellitus (HCC)     in the past    GERD (gastroesophageal reflux disease)     Hyperlipidemia     Hypertension     Other disorders of kidney and ureter in diseases classified elsewhere     Psychiatric problem     Squamous cell carcinoma of right lung ([facility] [address] 2023     SURGICAL HISTORY:  Past Surgical History:   Procedure Laterality Date    BRONCHOSCOPY N/A 2022    BRONCHOSCOPY EBUS performed by Juli Archibald MD at [address] [place] [address] Left 2018    LEFT CAROTID ENDARTERECTOMY performed by Litzy Beckwith MD at [facility]      right eye for detached retina    TONSILLECTOMY      TOTAL HIP ARTHROPLASTY Right 2022    RIGHT TOTAL HIP REPLACEMENT performed by Carole Palacios MD at [address] [place] [address] Road:  Social History     Tobacco Use    Smoking status: Former     Packs/day: 0.75 Years: 20.00     Pack years: 15.00     Types: Cigarettes     Quit date: 2015     Years since quittin.9    Smokeless tobacco: Never   Vaping Use    Vaping Use: Never used   Substance Use Topics    Alcohol use: No    Drug use: No     ALLERGIES:No Known Allergies  FAMILY HISTORY:  Family History   Problem Relation Age of Onset    Cancer Mother         bladder    Diabetes Mother     Asthma Father     Diabetes Sister     Heart Disease Brother         MI @ 52    Heart Disease Sister         MI in 45s     CURRENT MEDICATIONS:  Current Outpatient Medications   Medication Sig Dispense Refill    oseltamivir (TAMIFLU) 75 MG capsule Take 75 mg by mouth 2 times daily      albuterol sulfate HFA (VENTOLIN HFA) 108 (90 Base) MCG/ACT inhaler Inhale 2 puffs into the lungs every 4 hours as needed for Wheezing or Shortness of Breath 1 each 3    benzonatate (TESSALON) 100 MG capsule Take 1-2 capsules by mouth 3 times daily as needed for Cough 60 capsule 0    metoprolol tartrate (LOPRESSOR) 50 MG tablet Take 1 tablet by mouth 2 times daily 60 tablet 3    donepezil (ARICEPT) 10 MG tablet Take 5 mg by mouth nightly      traZODone (DESYREL) 100 MG tablet Take 100 mg by mouth at bedtime      acetaminophen (TYLENOL) 325 MG tablet Take 2 tablets by mouth every 4 hours as needed for Pain 120 tablet 3    atorvastatin (LIPITOR) 80 MG tablet Take 1 tablet by mouth daily 30 tablet 0    pantoprazole (PROTONIX) 40 MG tablet Take 1 tablet by mouth every morning (before breakfast) 30 tablet 11    nitroGLYCERIN (NITROSTAT) 0.4 MG SL tablet Place 1 tablet under the tongue every 5 minutes as needed for Chest pain 25 tablet 3    amLODIPine (NORVASC) 10 MG tablet Take 1 tablet by mouth daily 30 tablet 3     No current facility-administered medications for this visit. ROS   Review of Systems   Constitutional: Negative. Negative for chills, fever and unexpected weight change. HENT: Negative. Eyes: Negative. Respiratory: Negative. Negative for chest tightness, wheezing and stridor. Cardiovascular:  Negative for chest pain and leg swelling. Gastrointestinal: Negative. Negative for diarrhea, nausea and vomiting. Endocrine: Negative. Genitourinary: Negative. Negative for dysuria. Musculoskeletal: Negative. Skin: Negative. Allergic/Immunologic: Negative. Neurological: Negative. Hematological: Negative. Psychiatric/Behavioral: Negative. Physical exam   /60 (Site: Left Upper Arm, Position: Sitting, Cuff Size: Medium Adult)   Pulse 90   Temp 97.3 °F (36.3 °C) (Infrared)   Ht 5' 10\" (1.778 m)   Wt 178 lb 6.4 oz (80.9 kg)   SpO2 99%   BMI 25.60 kg/m²    Wt Readings from Last 3 Encounters:   01/05/23 178 lb 6.4 oz (80.9 kg)   12/30/22 184 lb 8 oz (83.7 kg)   12/22/22 183 lb (83 kg)       Physical Exam  Vitals and nursing note reviewed. Constitutional:       General: He is not in acute distress. Appearance: He is well-developed. HENT:      Head: Normocephalic and atraumatic. Neck:      Trachea: No tracheal deviation. Cardiovascular:      Rate and Rhythm: Normal rate and regular rhythm. Heart sounds: Normal heart sounds. No murmur heard. Pulmonary:      Effort: Pulmonary effort is normal. No respiratory distress. Breath sounds: Normal breath sounds. No stridor. No wheezing or rales. Chest:      Chest wall: No tenderness. Abdominal:      General: Bowel sounds are normal. There is no distension. Palpations: Abdomen is soft. Skin:     General: Skin is warm and dry. Capillary Refill: Capillary refill takes less than 2 seconds. Neurological:      Mental Status: He is alert and oriented to person, place, and time. Psychiatric:         Behavior: Behavior normal.         Thought Content:  Thought content normal.         Judgment: Judgment normal.        Results   Lung Nodule Screening     [x] Qualifies    [] Does not qualify   [] Declined    [x] Completed    The USPSTF recommends annual screening for lung cancer with low-dose computed tomography (LDCT) in adults aged 48 to [de-identified] years who have a 30 pack-year smoking history and currently smoke or have quit within the past 20 years. Screening should be discontinued once a person has not smoked for 20 years or develops a health problem that substantially limits life expectancy or the ability or willingness to have curative lung surgery. Six Minute Walk Test  Katrina Ortega 1950    Six minute walk test done in my office today by my medical assistant. Stephen's oxygen saturation at rest on room air was 92%. His oxygen saturation dropped to 74% on room air with exertion after walking 0 feet and within 0 minutes. Patient dropped to 74% upon standing     The six minute walk test was repeated with oxygen supplementation. Oxygen supplementation was started with 1 LPM via nasal cannula and titrated to 2 LPM via nasal cannula. At the end of the test Anup Brown oxygen saturation remained at 98% on 2 LPM with exertion. He is mobile in the home and requires oxygen as outlined above. Patient ambulated a total of 540 feet with oxygen. Resting Dyspnea/Elfego score was 2 / 2  and 4 / 3  upon completion of the walk. Resting heart rate was  92 bpm and 110 bpm upon completing the walk. Nasal Oxygen order:  2 lpm to be used with:  Rest: No.  Walking: Yes. Sleep: No.   POC flow: Yes. Continuous flow: No.    DME Medical Necessity Documentation    Anup Guevara was seen in the office on 1/5/2023 for the diagnosis COPD. I am prescribing oxygen because the diagnosis and testing requires the patient to have oxygen in the home. his condition will improve or be benefited by oxygen use. The patient is able to perform good mobility in a home setting and therefore does require the use of a portable oxygen system. FINAL RESULTS:   A: 4R lymph node, EBUS-FNA:    POSITIVE FOR MALIGNANCY.     Findings are consistent with poorly differentiated squamous cell   carcinoma. Note: The tumor cells demonstrate nuclear pleomorphism with       prominent nucleoli and focal spindling. Some areas demonstrate       features suggestive of squamous differentiation. The findings are       indicative of a poorly differentiated squamous cell carcinoma. Immunohistochemical stains for PAX-8, TTF-1, Napsin A, and p40 were       performed. The controls are adequate. The tumor cells demonstrate       positive staining for p40 and are negative for the other three       markers. These immunohistochemical findings support the histologic       impression of squamous cell carcinoma. B: 4L lymph node, EBUS-FNA:    No malignant cells seen. Lymphoid sample with anthracotic macrophages. C: Station 7 lymph node, EBUS-FNA:    No malignant cells seen. Lymphoid sample with anthracotic macrophages. Specimen/s:     2R-  Not visualized due to the presence of Endotracheal tube. 2L-  Not visualized due to the presence of Endotracheal tube. 4R- (approximately 20mm in short axis). TBNA performed X 6-Sarah is positive for neoplastic process in slide 1 and 3. Non-small cell cancer. 4L- (approximately 6mm in short axis). TBNA performed X 4      7- (approximately 8mm in short axis). TBNA performed X 4      10R-no enlarged lymph node was noted     10L-no enlarged lymph node was noted     11R-S-no enlarged lymph node was noted     11R-I-no enlarged lymph node was noted     11L-no enlarged lymph node was noted     Assessment      Diagnosis Orders   1. Mediastinal mass        2. Squamous cell carcinoma of right lung ([address]  Full PFT Study With Bronchodilator    6 Minute Walk Test    DME Order for Home Oxygen as OP      3. Chronic obstructive pulmonary disease, unspecified COPD type (HCC)  albuterol sulfate HFA (VENTOLIN HFA) 108 (90 Base) MCG/ACT inhaler    Full PFT Study With Bronchodilator    6 Minute Walk Test    DME Order for Home Oxygen as OP      4.  Cough, unspecified type  benzonatate (TESSALON) 100 MG capsule      5.  SOB (shortness of breath) on exertion  6 Minute Walk Test            Plan   -EBUS results today in the office (+) squamous cell carcinoma   -Advised to maintain pneumonia vaccine with PCP and to take flu vaccine this coming season.  -Advised patient to call office with any changes, questions, or concerns regarding respiratory status  -6MWT today 2LPM w/activity only   -DME note placed for home oxygen   -RX done for Albuterol 2 puffs every 4-6 hours as needed for SOB/wheezing   -RX done for Tessalon Pearles for cough  -PFT  -RTC 2-3 months PFT testing prior   -Office note today to PCP and Dr. Jenna Gonzalez office     Will see Lola Ennis back in: 2 months    Zhang Cornejo  1/5/2023

## 2023-01-05 NOTE — TELEPHONE ENCOUNTER
We picked patient up in the hospital and did a EBUS. Patient has [facility] [place] [facility] thru [place] [facility], his [facility] [place] [facility] PCP is Dr Perry Brasher from [place] office [phone]. I spoke with Chantal Avila his nurse and they were not notified by [facility] that he was an inpatient. She will have Dr Perry Brasher put in an urgent consult request to [facility] [address]. Patient has an appt today for results. I faxed the reports to Dr Caden Min office [phone]. Patient was notified that there is no auth at this time.

## 2023-01-29 PROBLEM — J10.1 INFLUENZA A: Status: RESOLVED | Noted: 2022-12-30 | Resolved: 2023-01-29

## 2023-01-30 RX ORDER — METOPROLOL TARTRATE 50 MG/1
TABLET, FILM COATED ORAL
Qty: 60 TABLET | Refills: 3 | OUTPATIENT
Start: 2023-01-30

## 2023-02-16 PROBLEM — Z79.4 TYPE 2 DIABETES MELLITUS WITH CIRCULATORY DISORDER, WITH LONG-TERM CURRENT USE OF INSULIN (HCC): Status: ACTIVE | Noted: 2018-11-06

## 2023-02-16 PROBLEM — E11.59 TYPE 2 DIABETES MELLITUS WITH CIRCULATORY DISORDER, WITH LONG-TERM CURRENT USE OF INSULIN (HCC): Status: ACTIVE | Noted: 2018-11-06

## 2023-03-16 ENCOUNTER — CLINICAL DOCUMENTATION (OUTPATIENT)
Dept: SPIRITUAL SERVICES | Facility: CLINIC | Age: 73
End: 2023-03-16

## 2023-03-16 NOTE — PROGRESS NOTES
The  contacted spouse Chen Aguirre concerning the death of her  [place]. She stated that the family is at a loss and right now everything feels numb. She shared that it doesn't seem right without him. Mrs Tere Casiano also said that Anup Guevara was a  and his Buddhism was also mourning his death. I offered emotional support, words of encouragement and prayer. The family has a strong estela in God.

## 2023-05-11 ENCOUNTER — CLINICAL DOCUMENTATION (OUTPATIENT)
Dept: SPIRITUAL SERVICES | Facility: CLINIC | Age: 73
End: 2023-05-11

## 2023-05-11 NOTE — PROGRESS NOTES
The  made a follow up call the Mrs Edel Greenwood concerning grief support. The family have been coping as well as can be expected. The children and friends have been very supportive and has made the grieving process more manageable. They all have had their private moments but their estela has carried them through. I offered emotional support and prayer.

## 2024-06-05 ENCOUNTER — HOSPITAL ENCOUNTER (OUTPATIENT)
Dept: CT IMAGING | Age: 74
Discharge: HOME | End: 2024-06-05
Attending: RADIOLOGY

## 2024-06-05 DIAGNOSIS — R69 DIAGNOSIS UNKNOWN: ICD-10-CM

## (undated) DEVICE — SPONGE GZ W4XL4IN COT 12 PLY TYP VII WVN C FLD DSGN

## (undated) DEVICE — GOWN,SIRUS,NONRNF,SETINSLV,XL,20/CS: Brand: MEDLINE

## (undated) DEVICE — SUTURE PROL SZ 6-0 L24IN NONABSORBABLE BLU L13MM C-1 3/8 8726H

## (undated) DEVICE — BLADE OPHTH D5MM 15DEG GRN W/ RND KNURLED HNDL MICRO-SHARP

## (undated) DEVICE — TUBING, SUCTION, 1/4" X 20', STRAIGHT: Brand: MEDLINE INDUSTRIES, INC.

## (undated) DEVICE — 3M™ BAIR HUGGER® MULTI ACCESS BLANKET, PEDIATRIC, FULL BODY, 10 PER CASE 31000: Brand: BAIR HUGGER™

## (undated) DEVICE — SUTURE NONABSORBABLE MONOFILAMENT 4-0 RB-1 36 IN BLU PROLENE 8557H

## (undated) DEVICE — Z DISCONTINUED BY MEDLINE USE 2711682 TRAY SKIN PREP DRY W/ PREM GLV

## (undated) DEVICE — GOWN,SIRUS,NON REINFRCD,LARGE,SET IN SL: Brand: MEDLINE

## (undated) DEVICE — TOTAL TRAY, DB, 100% SILI FOLEY, 16FR 10: Brand: MEDLINE

## (undated) DEVICE — SUTURE GOR TX SZ 5-0 L30IN NONABSORBABLE L13MM THC-13 1/2 6M12A

## (undated) DEVICE — PACK,UNIVERSAL,NO GOWNS: Brand: MEDLINE

## (undated) DEVICE — SPLINT ARMBRD W3XL10.5IN POLYFOAM DLX A LN

## (undated) DEVICE — COVER US PRB W5XL96IN LTX W/ GEL

## (undated) DEVICE — GLOVE SURG SZ 65 THK91MIL LTX FREE SYN POLYISOPRENE

## (undated) DEVICE — RESERVOIR,SUCTION,100CC,SILICONE: Brand: MEDLINE

## (undated) DEVICE — BLADE CLIPPER GEN PURP NS

## (undated) DEVICE — AGENT HEMSTAT W2XL4IN OXIDIZED REGENERATED CELOS ABSRB SFT

## (undated) DEVICE — SUTURE ETHBND EXCEL SZ 5 L30IN NONABSORBABLE GRN L48MM V-40 MB46G

## (undated) DEVICE — LINER SUCT CANSTR 1500CC SEMI RIG W/ POR HYDROPHOBIC SHUT

## (undated) DEVICE — FOGARTY - HYDRAGRIP SURGICAL - CLAMP INSERTS: Brand: FOGARTY SOFTJAW

## (undated) DEVICE — GLOVE PROTCT PF XL ANTIMICROBIAL A4 CUT RESIST SCEPTER LTX

## (undated) DEVICE — SET CATH 20GA L1.5IN RAD ART POLYUR RADPQ W/ INTEGR 0.018IN

## (undated) DEVICE — SUNDT™ EXTERNAL CAROTID ENDARTERECTOMY SHUNT: Brand: SUNDT™

## (undated) DEVICE — SOLUTION IV 500ML 0.9% SOD CHL PH 5 INJ USP VIAFLX PLAS

## (undated) DEVICE — BARD® PTFE FELT, 5.1 CM X 5.1 CM
Type: IMPLANTABLE DEVICE | Status: NON-FUNCTIONAL
Brand: BARD® PTFE FELT
Removed: 2018-12-26

## (undated) DEVICE — GAUZE,SPONGE,8"X4",12PLY,XRAY,STRL,LF: Brand: MEDLINE

## (undated) DEVICE — SUTURE VCRL SZ 1 L36IN ABSRB UD CTX L48MM 1/2 CIR J977H

## (undated) DEVICE — APPLIER CLP M L L11.4IN DIA10MM ENDOSCP ROT MULT FOR LIG

## (undated) DEVICE — CATHETER,URETHRAL,REDRUBBER,STERILE,20FR: Brand: MEDLINE

## (undated) DEVICE — DRAPE,INSTRUMENT,MAGNETIC,10X16: Brand: MEDLINE

## (undated) DEVICE — 500ML,PRESSURE INFUSER W/STOPCOCK: Brand: MEDLINE

## (undated) DEVICE — PAD GEN USE BORDERED ADH 14IN 2IN AND 12IN 4IN GZ UNIV ST

## (undated) DEVICE — E-Z CLEAN, NON-STICK, PTFE COATED, ELECTROSURGICAL BLADE ELECTRODE, MODIFIED EXTENDED INSULATION, 4 INCH (10.2 CM): Brand: MEGADYNE

## (undated) DEVICE — SYSTEM SKIN CLSR 22CM DERMBND PRINEO

## (undated) DEVICE — PRESSURE MONITORING SET: Brand: TRUWAVE

## (undated) DEVICE — DUAL CUT SAGITTAL BLADE

## (undated) DEVICE — PACK-MAJOR

## (undated) DEVICE — BASIC SINGLE BASIN BTC-LF: Brand: MEDLINE INDUSTRIES, INC.

## (undated) DEVICE — PAD HIP IMMOB

## (undated) DEVICE — AEGIS 1" DISK 4MM HOLE, PEEL OPEN: Brand: MEDLINE

## (undated) DEVICE — SOLUTION IV 1000ML 0.9% SOD CHL PH 5 INJ USP VIAFLX PLAS

## (undated) DEVICE — SUTURE N ABSRB MFIL TAPR PNT PREM NDL EXP PTFE CV SUT DBL 7M10

## (undated) DEVICE — GLOVE ORANGE PI 7 1/2   MSG9075

## (undated) DEVICE — 4-PORT MANIFOLD: Brand: NEPTUNE 2

## (undated) DEVICE — ROYAL SILK SURGICAL GOWN, XXL: Brand: CONVERTORS

## (undated) DEVICE — SYRINGE MED 10ML LUERLOCK TIP W/O SFTY DISP

## (undated) DEVICE — APPLIER LIG CLP M L11IN TI STR RNG HNDL FOR 20 CLP DISP

## (undated) DEVICE — COVER ARMBRD W13XL28.5IN IMPERV BLU FOR OP RM

## (undated) DEVICE — SUTURE STRATAFIX SPRL SZ 1 L14IN ABSRB VLT L48CM CTX 1/2 SXPD2B405

## (undated) DEVICE — DECANTER FLD 9IN ST BG FOR ASEP TRNSF OF FLD

## (undated) DEVICE — BLADE OPHTH GRN ROUNDED TIP 1 SIDE SHRP GRINDLESS MINI-BLDE

## (undated) DEVICE — Device: Brand: RETRACT-O-TAPE 18G X 30.5CM BLUNT NEEDLE

## (undated) DEVICE — NEEDLE ASPIR 21GA L700MM US GUID TREAT DST END FOR EFFICIENT

## (undated) DEVICE — 3M™ TRANSPORE™ WHITE SURGICAL TAPE 1534-2, 2 INCH X 10 YARD (5CM X 9,1M), 6 ROLLS/CARTON 10 CARTONS/CASE: Brand: 3M™ TRANSPORE™

## (undated) DEVICE — Z DISCONTINUED APPLICATOR SURG PREP 0.35OZ 2% CHG 70% ISO ALC W/ HI LT

## (undated) DEVICE — CONTAINER,SPECIMEN,PNEU TUBE,4OZ,OR STRL: Brand: MEDLINE

## (undated) DEVICE — SUTURE PROL SZ 7-0 L24IN NONABSORBABLE BLU L9.3MM CC 3/8 8704H

## (undated) DEVICE — YANKAUER,BULB TIP,W/O VENT,RIGID,STERILE: Brand: MEDLINE

## (undated) DEVICE — SET TRNQT STD 12FR TB LEN 7 IN 2 RED 2 BLU 2 CLR 16FR 2 L

## (undated) DEVICE — 3M™ IOBAN™ 2 ANTIMICROBIAL INCISE DRAPE 6650EZ: Brand: IOBAN™ 2

## (undated) DEVICE — SYRINGE IRRIG 60ML SFT PLIABLE BLB EZ TO GRP 1 HND USE W/

## (undated) DEVICE — SUTURE MCRYL SZ 4-0 L27IN ABSRB UD L19MM PS-2 1/2 CIR PRIM Y426H

## (undated) DEVICE — TUBING PRSS 36 M F

## (undated) DEVICE — SUTURE VCRL SZ 3-0 L27IN ABSRB UD FS-2 L19MM 1/2 CIR J423H

## (undated) DEVICE — SKIN AFFIX SURG ADHESIVE 72/CS 0.55ML: Brand: MEDLINE

## (undated) DEVICE — BOOT,SUTURE,STANDARD,YELLOW-IN-BLUE: Brand: MEDLINE

## (undated) DEVICE — SUTURE PERMA-HAND SZ 3-0 L30IN NONABSORBABLE BLK L60MM KS 622H

## (undated) DEVICE — SOLUTION IV IRRIG POUR BRL 0.9% SODIUM CHL 2F7124

## (undated) DEVICE — Device

## (undated) DEVICE — 450 ML BOTTLE OF 0.05% CHLORHEXIDINE GLUCONATE IN 99.95% STERILE WATER FOR IRRIGATION, USP AND APPLICATOR.: Brand: IRRISEPT ANTIMICROBIAL WOUND LAVAGE

## (undated) DEVICE — SUTURE MCRYL SZ 3-0 L27IN ABSRB UD L24MM PS-1 3/8 CIR PRIM Y936H

## (undated) DEVICE — BLADE ES L6IN ELASTOMERIC COAT EXT DURABLE BEND UPTO 90DEG

## (undated) DEVICE — APPLIER CLP L9.375IN APER 2.1MM CLS L3.8MM 20 SM TI CLP

## (undated) DEVICE — SET CATH 20GA L1.75IN RAD ART POLYUR RADPQ W/ INTEGR

## (undated) DEVICE — INTENDED FOR TISSUE SEPARATION, AND OTHER PROCEDURES THAT REQUIRE A SHARP SURGICAL BLADE TO PUNCTURE OR CUT.: Brand: BARD-PARKER ® CARBON RIB-BACK BLADES

## (undated) DEVICE — Z CONVERTED USE 2715898 SWABSTICK MEDICATED W1.75XL6.5IN 1.6ML 3.15% CHG 70% ISO